# Patient Record
Sex: FEMALE | Race: WHITE | NOT HISPANIC OR LATINO | Employment: OTHER | URBAN - METROPOLITAN AREA
[De-identification: names, ages, dates, MRNs, and addresses within clinical notes are randomized per-mention and may not be internally consistent; named-entity substitution may affect disease eponyms.]

---

## 2017-04-20 ENCOUNTER — HOSPITAL ENCOUNTER (EMERGENCY)
Facility: HOSPITAL | Age: 72
Discharge: HOME | End: 2017-04-20
Attending: EMERGENCY MEDICINE | Admitting: EMERGENCY MEDICINE

## 2017-04-20 ENCOUNTER — APPOINTMENT (OUTPATIENT)
Dept: GENERAL RADIOLOGY | Facility: HOSPITAL | Age: 72
End: 2017-04-20

## 2017-04-20 VITALS
WEIGHT: 150 LBS | RESPIRATION RATE: 18 BRPM | HEART RATE: 87 BPM | DIASTOLIC BLOOD PRESSURE: 81 MMHG | SYSTOLIC BLOOD PRESSURE: 118 MMHG | HEIGHT: 63 IN | BODY MASS INDEX: 26.58 KG/M2 | TEMPERATURE: 98.3 F | OXYGEN SATURATION: 98 %

## 2017-04-20 DIAGNOSIS — R06.00 DYSPNEA, UNSPECIFIED TYPE: Primary | ICD-10-CM

## 2017-04-20 DIAGNOSIS — R07.9 CHEST PAIN, UNSPECIFIED TYPE: ICD-10-CM

## 2017-04-20 LAB
ALBUMIN SERPL-MCNC: 4.2 G/DL (ref 3.2–4.8)
ALBUMIN/GLOB SERPL: 1.4 G/DL (ref 1.5–2.5)
ALP SERPL-CCNC: 87 U/L (ref 25–100)
ALT SERPL W P-5'-P-CCNC: 14 U/L (ref 7–40)
ANION GAP SERPL CALCULATED.3IONS-SCNC: 9 MMOL/L (ref 3–11)
AST SERPL-CCNC: 28 U/L (ref 0–33)
BASOPHILS # BLD AUTO: 0.04 10*3/MM3 (ref 0–0.2)
BASOPHILS NFR BLD AUTO: 0.5 % (ref 0–1)
BILIRUB SERPL-MCNC: 0.3 MG/DL (ref 0.3–1.2)
BNP SERPL-MCNC: 58 PG/ML (ref 0–100)
BUN BLD-MCNC: 33 MG/DL (ref 9–23)
BUN/CREAT SERPL: 33 (ref 7–25)
CALCIUM SPEC-SCNC: 9.4 MG/DL (ref 8.7–10.4)
CHLORIDE SERPL-SCNC: 105 MMOL/L (ref 99–109)
CO2 SERPL-SCNC: 25 MMOL/L (ref 20–31)
CREAT BLD-MCNC: 1 MG/DL (ref 0.6–1.3)
DEPRECATED RDW RBC AUTO: 52.7 FL (ref 37–54)
EOSINOPHIL # BLD AUTO: 0.31 10*3/MM3 (ref 0.1–0.3)
EOSINOPHIL NFR BLD AUTO: 3.9 % (ref 0–3)
ERYTHROCYTE [DISTWIDTH] IN BLOOD BY AUTOMATED COUNT: 14.2 % (ref 11.3–14.5)
GFR SERPL CREATININE-BSD FRML MDRD: 55 ML/MIN/1.73
GLOBULIN UR ELPH-MCNC: 2.9 GM/DL
GLUCOSE BLD-MCNC: 105 MG/DL (ref 70–100)
HCT VFR BLD AUTO: 33.5 % (ref 34.5–44)
HGB BLD-MCNC: 10.2 G/DL (ref 11.5–15.5)
HOLD SPECIMEN: NORMAL
HOLD SPECIMEN: NORMAL
IMM GRANULOCYTES # BLD: 0.03 10*3/MM3 (ref 0–0.03)
IMM GRANULOCYTES NFR BLD: 0.4 % (ref 0–0.6)
LIPASE SERPL-CCNC: 114 U/L (ref 6–51)
LYMPHOCYTES # BLD AUTO: 1.41 10*3/MM3 (ref 0.6–4.8)
LYMPHOCYTES NFR BLD AUTO: 17.5 % (ref 24–44)
MCH RBC QN AUTO: 31.2 PG (ref 27–31)
MCHC RBC AUTO-ENTMCNC: 30.4 G/DL (ref 32–36)
MCV RBC AUTO: 102.4 FL (ref 80–99)
MONOCYTES # BLD AUTO: 0.66 10*3/MM3 (ref 0–1)
MONOCYTES NFR BLD AUTO: 8.2 % (ref 0–12)
NEUTROPHILS # BLD AUTO: 5.59 10*3/MM3 (ref 1.5–8.3)
NEUTROPHILS NFR BLD AUTO: 69.5 % (ref 41–71)
PLATELET # BLD AUTO: 243 10*3/MM3 (ref 150–450)
PMV BLD AUTO: 9.5 FL (ref 6–12)
POTASSIUM BLD-SCNC: 4.3 MMOL/L (ref 3.5–5.5)
PROT SERPL-MCNC: 7.1 G/DL (ref 5.7–8.2)
RBC # BLD AUTO: 3.27 10*6/MM3 (ref 3.89–5.14)
SODIUM BLD-SCNC: 139 MMOL/L (ref 132–146)
TROPONIN I SERPL-MCNC: 0.01 NG/ML (ref 0–0.07)
TROPONIN I SERPL-MCNC: 0.02 NG/ML (ref 0–0.07)
WBC NRBC COR # BLD: 8.04 10*3/MM3 (ref 3.5–10.8)
WHOLE BLOOD HOLD SPECIMEN: NORMAL
WHOLE BLOOD HOLD SPECIMEN: NORMAL

## 2017-04-20 PROCEDURE — 83880 ASSAY OF NATRIURETIC PEPTIDE: CPT | Performed by: EMERGENCY MEDICINE

## 2017-04-20 PROCEDURE — 93005 ELECTROCARDIOGRAM TRACING: CPT | Performed by: EMERGENCY MEDICINE

## 2017-04-20 PROCEDURE — 85025 COMPLETE CBC W/AUTO DIFF WBC: CPT | Performed by: EMERGENCY MEDICINE

## 2017-04-20 PROCEDURE — 84484 ASSAY OF TROPONIN QUANT: CPT

## 2017-04-20 PROCEDURE — 80053 COMPREHEN METABOLIC PANEL: CPT | Performed by: EMERGENCY MEDICINE

## 2017-04-20 PROCEDURE — 71010 HC CHEST PA OR AP: CPT

## 2017-04-20 PROCEDURE — 83690 ASSAY OF LIPASE: CPT | Performed by: EMERGENCY MEDICINE

## 2017-04-20 PROCEDURE — 99284 EMERGENCY DEPT VISIT MOD MDM: CPT

## 2017-04-20 RX ORDER — ASPIRIN 325 MG
325 TABLET, DELAYED RELEASE (ENTERIC COATED) ORAL EVERY 6 HOURS PRN
COMMUNITY
End: 2018-12-13

## 2017-04-20 RX ORDER — ISOSORBIDE MONONITRATE 60 MG/1
60 TABLET, EXTENDED RELEASE ORAL DAILY
COMMUNITY
End: 2018-12-13

## 2017-04-20 RX ORDER — IBUPROFEN 200 MG
200 TABLET ORAL DAILY
COMMUNITY
End: 2018-12-13

## 2017-04-20 RX ORDER — FUROSEMIDE 80 MG
40 TABLET ORAL 2 TIMES DAILY
COMMUNITY
End: 2018-12-13

## 2017-04-20 RX ORDER — SULFASALAZINE 500 MG/1
500 TABLET ORAL 3 TIMES DAILY
COMMUNITY
End: 2018-12-13

## 2017-04-20 RX ORDER — OMEPRAZOLE 20 MG/1
20 CAPSULE, DELAYED RELEASE ORAL DAILY
COMMUNITY

## 2017-04-20 RX ORDER — ASPIRIN 81 MG/1
324 TABLET, CHEWABLE ORAL ONCE
Status: DISCONTINUED | OUTPATIENT
Start: 2017-04-20 | End: 2017-04-20 | Stop reason: HOSPADM

## 2017-04-20 RX ORDER — LISINOPRIL 20 MG/1
20 TABLET ORAL 2 TIMES DAILY
COMMUNITY

## 2017-04-20 RX ORDER — RANITIDINE 300 MG/1
300 CAPSULE ORAL EVERY EVENING
Status: ON HOLD | COMMUNITY
End: 2018-12-14

## 2017-04-20 RX ORDER — BUPROPION HYDROCHLORIDE 150 MG/1
150 TABLET ORAL DAILY
COMMUNITY

## 2017-04-20 RX ORDER — SODIUM CHLORIDE 0.9 % (FLUSH) 0.9 %
10 SYRINGE (ML) INJECTION AS NEEDED
Status: DISCONTINUED | OUTPATIENT
Start: 2017-04-20 | End: 2017-04-20 | Stop reason: HOSPADM

## 2017-04-20 RX ORDER — CYCLOBENZAPRINE HCL 10 MG
20 TABLET ORAL NIGHTLY
COMMUNITY

## 2017-04-20 RX ORDER — SPIRONOLACTONE 25 MG/1
25 TABLET ORAL DAILY
Status: ON HOLD | COMMUNITY
End: 2018-12-15 | Stop reason: SDUPTHER

## 2017-04-20 RX ORDER — FOLIC ACID 1 MG/1
1 TABLET ORAL NIGHTLY
COMMUNITY

## 2017-04-20 RX ORDER — LEFLUNOMIDE 20 MG/1
20 TABLET ORAL DAILY
COMMUNITY
End: 2018-12-13

## 2017-04-20 RX ORDER — TRAZODONE HYDROCHLORIDE 100 MG/1
150 TABLET ORAL NIGHTLY
COMMUNITY

## 2017-04-20 NOTE — ED PROVIDER NOTES
Subjective   HPI Comments: Stacia Adkins is a 71 y.o. female presenting to the ED with c/o chest pain. Mrs. Adkins reports that she had a sudden onset of difficulty breathing and chest pain at approximately 1230 today. The pain was located in the middle of her chest, which she describes as a pressure. It does not radiate anywhere. She also states that she began having tingling in her legs. She reports that standing in line at [employer] for an extended period of time, which she reports caused her discomfort. The pain worsened even after she took a seat immediately after onset. She was given aspirin prior to arrival by EMS. She currently presents to the ED reporting that it has begun to improve. Mrs. Adkins reports that she has had a similar episode in the past, with no known cause. She follows up with Dr. Perea. She normally does not have to wear oxygen outside of the house, but has a CPAP machine at night. Denies any nausea, sweats, fevers, dysuria, or chills. No other complaints at this time.    Patient is a 71 y.o. female presenting with chest pain.   History provided by:  Patient  Chest Pain   Pain location:  Substernal area  Pain quality: pressure    Pain radiates to:  Does not radiate  Pain severity:  Moderate  Onset quality:  Sudden  Duration:  2 hours  Timing:  Constant  Progression:  Improving  Chronicity:  New  Relieved by:  Aspirin  Worsened by:  Certain positions (standing)  Ineffective treatments:  None tried  Associated symptoms: numbness and shortness of breath    Associated symptoms: no abdominal pain, no back pain, no cough, no dizziness, no fatigue, no fever, no headache, no nausea, no palpitations, no vomiting and no weakness        Review of Systems   Constitutional: Negative for chills, fatigue and fever.   HENT: Negative for congestion, ear pain, postnasal drip, sinus pressure and sore throat.    Eyes: Negative for pain, redness and visual disturbance.   Respiratory: Positive for shortness of breath.  Negative for cough and chest tightness.    Cardiovascular: Positive for chest pain. Negative for palpitations and leg swelling.   Gastrointestinal: Negative for abdominal pain, anal bleeding, blood in stool, diarrhea, nausea and vomiting.   Endocrine: Negative for polydipsia and polyuria.   Genitourinary: Negative for difficulty urinating, dysuria, frequency and urgency.   Musculoskeletal: Negative for arthralgias, back pain and neck pain.   Skin: Negative for pallor and rash.   Allergic/Immunologic: Negative for environmental allergies and immunocompromised state.   Neurological: Positive for numbness. Negative for dizziness, weakness and headaches.   Hematological: Negative for adenopathy.   Psychiatric/Behavioral: Negative for confusion, self-injury and suicidal ideas. The patient is not nervous/anxious.    All other systems reviewed and are negative.      Past Medical History:   Diagnosis Date   • Arthritis, rheumatoid    • Back pain    • CHF (congestive heart failure)        Allergies   Allergen Reactions   • Gabapentin    • Penicillins    • Vancomycin        Past Surgical History:   Procedure Laterality Date   • BACK SURGERY     • CARDIAC VALVE SURGERY     • SHOULDER SURGERY         History reviewed. No pertinent family history.    Social History     Social History   • Marital status:      Spouse name: N/A   • Number of children: N/A   • Years of education: N/A     Social History Main Topics   • Smoking status: Never Smoker   • Smokeless tobacco: None   • Alcohol use No   • Drug use: No   • Sexual activity: Not Asked     Other Topics Concern   • None     Social History Narrative   • None         Objective   Physical Exam   Constitutional: She is oriented to person, place, and time. She appears well-developed and well-nourished.  Non-toxic appearance. No distress (NAD).   HENT:   Head: Normocephalic and atraumatic.   Right Ear: External ear normal.   Left Ear: External ear normal.   Nose: Nose normal.    Eyes: EOM and lids are normal. Pupils are equal, round, and reactive to light.   Neck: Normal range of motion. Neck supple. No tracheal deviation present.   Cardiovascular: Normal rate, regular rhythm and normal heart sounds.  Exam reveals no gallop, no friction rub and no decreased pulses.    No murmur heard.  Pulmonary/Chest: Effort normal and breath sounds normal. No respiratory distress. She has no decreased breath sounds. She has no wheezes. She has no rhonchi. She has no rales.   Breathing comfortably.   Abdominal: Soft. Normal appearance and bowel sounds are normal. There is tenderness (mild ttp) in the periumbilical area. There is no rebound and no guarding.   Musculoskeletal: Normal range of motion. She exhibits no deformity.   Lymphadenopathy:     She has no cervical adenopathy.   Neurological: She is alert and oriented to person, place, and time. She has normal strength. No cranial nerve deficit or sensory deficit.   Skin: Skin is warm and dry. No rash noted. She is not diaphoretic.   Psychiatric: She has a normal mood and affect. Her speech is normal and behavior is normal. Judgment and thought content normal. Cognition and memory are normal.   Nursing note and vitals reviewed.      Procedures         ED Course  ED Course                     MDM  Number of Diagnoses or Management Options  Chest pain, unspecified type: new and requires workup  Dyspnea, unspecified type: new and requires workup  Diagnosis management comments: Patient had normal serial cardiac enzymes, and EKGs. NO acute CXR abnormalities.     Patient has remained well appearing with normal vital signs while here in ER.     DC home appearing and feeling well.        Amount and/or Complexity of Data Reviewed  Clinical lab tests: ordered and reviewed  Tests in the radiology section of CPT®: ordered and reviewed  Decide to obtain previous medical records or to obtain history from someone other than the patient: yes  Review and summarize past  medical records: yes  Independent visualization of images, tracings, or specimens: yes    Patient Progress  Patient progress: stable      Final diagnoses:   Dyspnea, unspecified type   Chest pain, unspecified type       Documentation assistance provided by charli Franco.  Information recorded by the scribe was done at my direction and has been verified and validated by me.     Kane Franco  04/20/17 [id number]       Trey Pagan MD  04/20/17 2349

## 2018-12-13 ENCOUNTER — APPOINTMENT (OUTPATIENT)
Dept: GENERAL RADIOLOGY | Facility: HOSPITAL | Age: 73
End: 2018-12-13

## 2018-12-13 ENCOUNTER — APPOINTMENT (OUTPATIENT)
Dept: CT IMAGING | Facility: HOSPITAL | Age: 73
End: 2018-12-13

## 2018-12-13 ENCOUNTER — HOSPITAL ENCOUNTER (OUTPATIENT)
Facility: HOSPITAL | Age: 73
Setting detail: OBSERVATION
Discharge: HOME | End: 2018-12-15
Attending: EMERGENCY MEDICINE | Admitting: INTERNAL MEDICINE

## 2018-12-13 DIAGNOSIS — T79.6XXA TRAUMATIC RHABDOMYOLYSIS, INITIAL ENCOUNTER (HCC): ICD-10-CM

## 2018-12-13 DIAGNOSIS — Z78.9 IMPAIRED MOBILITY AND ADLS: ICD-10-CM

## 2018-12-13 DIAGNOSIS — R06.02 SHORTNESS OF BREATH: Primary | ICD-10-CM

## 2018-12-13 DIAGNOSIS — F43.0 STRESS RESPONSE: ICD-10-CM
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
## 2018-12-13 DIAGNOSIS — Z74.09 IMPAIRED MOBILITY AND ADLS: ICD-10-CM
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
## 2018-12-13 DIAGNOSIS — Z74.09 IMPAIRED FUNCTIONAL MOBILITY, BALANCE, GAIT, AND ENDURANCE: ICD-10-CM

## 2018-12-13 PROBLEM — M06.9 RHEUMATOID ARTHRITIS INVOLVING MULTIPLE SITES: Status: ACTIVE | Noted: 2018-12-13

## 2018-12-13 PROBLEM — I50.32 DIASTOLIC CHF, CHRONIC: Status: ACTIVE | Noted: 2018-12-13

## 2018-12-13 PROBLEM — Z95.2 H/O AORTIC VALVE REPLACEMENT: Status: ACTIVE | Noted: 2018-12-13

## 2018-12-13 PROBLEM — M62.82 RHABDOMYOLYSIS: Status: ACTIVE | Noted: 2018-12-13

## 2018-12-13 LAB
ALBUMIN SERPL-MCNC: 4.58 G/DL (ref 3.2–4.8)
ALBUMIN/GLOB SERPL: 1.8 G/DL (ref 1.5–2.5)
ALP SERPL-CCNC: 139 U/L (ref 25–100)
ALT SERPL W P-5'-P-CCNC: 47 U/L (ref 7–40)
ANION GAP SERPL CALCULATED.3IONS-SCNC: 13 MMOL/L (ref 3–11)
AST SERPL-CCNC: 65 U/L (ref 0–33)
BASOPHILS # BLD AUTO: 0.02 10*3/MM3 (ref 0–0.2)
BASOPHILS NFR BLD AUTO: 0.2 % (ref 0–1)
BILIRUB SERPL-MCNC: 0.7 MG/DL (ref 0.3–1.2)
BNP SERPL-MCNC: 100 PG/ML (ref 0–100)
BUN BLD-MCNC: 30 MG/DL (ref 9–23)
BUN/CREAT SERPL: 28.8 (ref 7–25)
CALCIUM SPEC-SCNC: 9.6 MG/DL (ref 8.7–10.4)
CHLORIDE SERPL-SCNC: 103 MMOL/L (ref 99–109)
CK SERPL-CCNC: 695 U/L (ref 26–174)
CO2 SERPL-SCNC: 23 MMOL/L (ref 20–31)
CREAT BLD-MCNC: 1.04 MG/DL (ref 0.6–1.3)
DEPRECATED RDW RBC AUTO: 50.4 FL (ref 37–54)
EOSINOPHIL # BLD AUTO: 0.07 10*3/MM3 (ref 0–0.3)
EOSINOPHIL NFR BLD AUTO: 0.6 % (ref 0–3)
ERYTHROCYTE [DISTWIDTH] IN BLOOD BY AUTOMATED COUNT: 14.4 % (ref 11.3–14.5)
GFR SERPL CREATININE-BSD FRML MDRD: 52 ML/MIN/1.73
GLOBULIN UR ELPH-MCNC: 2.5 GM/DL
GLUCOSE BLD-MCNC: 158 MG/DL (ref 70–100)
HCT VFR BLD AUTO: 43 % (ref 34.5–44)
HGB BLD-MCNC: 14 G/DL (ref 11.5–15.5)
HOLD SPECIMEN: NORMAL
HOLD SPECIMEN: NORMAL
IMM GRANULOCYTES # BLD: 0.04 10*3/MM3 (ref 0–0.03)
IMM GRANULOCYTES NFR BLD: 0.3 % (ref 0–0.6)
LYMPHOCYTES # BLD AUTO: 1.92 10*3/MM3 (ref 0.6–4.8)
LYMPHOCYTES NFR BLD AUTO: 16.7 % (ref 24–44)
MCH RBC QN AUTO: 31.5 PG (ref 27–31)
MCHC RBC AUTO-ENTMCNC: 32.6 G/DL (ref 32–36)
MCV RBC AUTO: 96.6 FL (ref 80–99)
MONOCYTES # BLD AUTO: 0.41 10*3/MM3 (ref 0–1)
MONOCYTES NFR BLD AUTO: 3.6 % (ref 0–12)
NEUTROPHILS # BLD AUTO: 9.06 10*3/MM3 (ref 1.5–8.3)
NEUTROPHILS NFR BLD AUTO: 78.9 % (ref 41–71)
PLATELET # BLD AUTO: 250 10*3/MM3 (ref 150–450)
PMV BLD AUTO: 9.9 FL (ref 6–12)
POTASSIUM BLD-SCNC: 3.8 MMOL/L (ref 3.5–5.5)
PROT SERPL-MCNC: 7.1 G/DL (ref 5.7–8.2)
RBC # BLD AUTO: 4.45 10*6/MM3 (ref 3.89–5.14)
SODIUM BLD-SCNC: 139 MMOL/L (ref 132–146)
TROPONIN I SERPL-MCNC: 0.05 NG/ML (ref 0–0.07)
TROPONIN I SERPL-MCNC: 0.05 NG/ML (ref 0–0.07)
WBC NRBC COR # BLD: 11.48 10*3/MM3 (ref 3.5–10.8)
WHOLE BLOOD HOLD SPECIMEN: NORMAL
WHOLE BLOOD HOLD SPECIMEN: NORMAL

## 2018-12-13 PROCEDURE — 96372 THER/PROPH/DIAG INJ SC/IM: CPT

## 2018-12-13 PROCEDURE — 84484 ASSAY OF TROPONIN QUANT: CPT

## 2018-12-13 PROCEDURE — G0378 HOSPITAL OBSERVATION PER HR: HCPCS

## 2018-12-13 PROCEDURE — 96375 TX/PRO/DX INJ NEW DRUG ADDON: CPT

## 2018-12-13 PROCEDURE — 96361 HYDRATE IV INFUSION ADD-ON: CPT

## 2018-12-13 PROCEDURE — 99285 EMERGENCY DEPT VISIT HI MDM: CPT

## 2018-12-13 PROCEDURE — 83880 ASSAY OF NATRIURETIC PEPTIDE: CPT | Performed by: EMERGENCY MEDICINE

## 2018-12-13 PROCEDURE — 71045 X-RAY EXAM CHEST 1 VIEW: CPT

## 2018-12-13 PROCEDURE — 25010000002 HEPARIN (PORCINE) PER 1000 UNITS: Performed by: INTERNAL MEDICINE

## 2018-12-13 PROCEDURE — 25010000002 LORAZEPAM PER 2 MG: Performed by: EMERGENCY MEDICINE

## 2018-12-13 PROCEDURE — 99220 PR INITIAL OBSERVATION CARE/DAY 70 MINUTES: CPT | Performed by: INTERNAL MEDICINE

## 2018-12-13 PROCEDURE — 85025 COMPLETE CBC W/AUTO DIFF WBC: CPT | Performed by: EMERGENCY MEDICINE

## 2018-12-13 PROCEDURE — 25010000002 KETOROLAC TROMETHAMINE PER 15 MG: Performed by: EMERGENCY MEDICINE

## 2018-12-13 PROCEDURE — 93005 ELECTROCARDIOGRAM TRACING: CPT | Performed by: EMERGENCY MEDICINE

## 2018-12-13 PROCEDURE — 80053 COMPREHEN METABOLIC PANEL: CPT | Performed by: EMERGENCY MEDICINE

## 2018-12-13 PROCEDURE — 96374 THER/PROPH/DIAG INJ IV PUSH: CPT

## 2018-12-13 PROCEDURE — 82550 ASSAY OF CK (CPK): CPT | Performed by: EMERGENCY MEDICINE

## 2018-12-13 PROCEDURE — 70450 CT HEAD/BRAIN W/O DYE: CPT

## 2018-12-13 RX ORDER — HYDROCODONE BITARTRATE AND ACETAMINOPHEN 5; 325 MG/1; MG/1
1 TABLET ORAL EVERY 4 HOURS PRN
Status: DISCONTINUED | OUTPATIENT
Start: 2018-12-13 | End: 2018-12-15 | Stop reason: HOSPADM

## 2018-12-13 RX ORDER — SODIUM CHLORIDE 0.9 % (FLUSH) 0.9 %
3 SYRINGE (ML) INJECTION EVERY 12 HOURS SCHEDULED
Status: DISCONTINUED | OUTPATIENT
Start: 2018-12-13 | End: 2018-12-15 | Stop reason: HOSPADM

## 2018-12-13 RX ORDER — HEPARIN SODIUM 5000 [USP'U]/ML
5000 INJECTION, SOLUTION INTRAVENOUS; SUBCUTANEOUS EVERY 8 HOURS SCHEDULED
Status: DISCONTINUED | OUTPATIENT
Start: 2018-12-13 | End: 2018-12-15 | Stop reason: HOSPADM

## 2018-12-13 RX ORDER — ONDANSETRON 4 MG/1
4 TABLET, FILM COATED ORAL EVERY 6 HOURS PRN
Status: DISCONTINUED | OUTPATIENT
Start: 2018-12-13 | End: 2018-12-15 | Stop reason: HOSPADM

## 2018-12-13 RX ORDER — ASPIRIN 81 MG/1
81 TABLET, CHEWABLE ORAL DAILY
Status: DISCONTINUED | OUTPATIENT
Start: 2018-12-13 | End: 2018-12-15 | Stop reason: HOSPADM

## 2018-12-13 RX ORDER — BUPROPION HYDROCHLORIDE 150 MG/1
150 TABLET ORAL DAILY
Status: DISCONTINUED | OUTPATIENT
Start: 2018-12-13 | End: 2018-12-15 | Stop reason: HOSPADM

## 2018-12-13 RX ORDER — KETOROLAC TROMETHAMINE 15 MG/ML
7.5 INJECTION, SOLUTION INTRAMUSCULAR; INTRAVENOUS ONCE
Status: COMPLETED | OUTPATIENT
Start: 2018-12-13 | End: 2018-12-13

## 2018-12-13 RX ORDER — ASPIRIN 81 MG/1
81 TABLET, CHEWABLE ORAL NIGHTLY
COMMUNITY

## 2018-12-13 RX ORDER — FOLIC ACID 1 MG/1
1 TABLET ORAL DAILY
Status: DISCONTINUED | OUTPATIENT
Start: 2018-12-13 | End: 2018-12-15 | Stop reason: HOSPADM

## 2018-12-13 RX ORDER — RANOLAZINE 500 MG/1
500 TABLET, EXTENDED RELEASE ORAL DAILY
COMMUNITY

## 2018-12-13 RX ORDER — RANOLAZINE 500 MG/1
500 TABLET, EXTENDED RELEASE ORAL EVERY 12 HOURS SCHEDULED
Status: DISCONTINUED | OUTPATIENT
Start: 2018-12-13 | End: 2018-12-15 | Stop reason: HOSPADM

## 2018-12-13 RX ORDER — ACETAMINOPHEN 325 MG/1
650 TABLET ORAL EVERY 4 HOURS PRN
Status: DISCONTINUED | OUTPATIENT
Start: 2018-12-13 | End: 2018-12-15 | Stop reason: HOSPADM

## 2018-12-13 RX ORDER — SODIUM CHLORIDE 9 MG/ML
125 INJECTION, SOLUTION INTRAVENOUS CONTINUOUS
Status: DISCONTINUED | OUTPATIENT
Start: 2018-12-13 | End: 2018-12-13

## 2018-12-13 RX ORDER — SODIUM CHLORIDE 0.9 % (FLUSH) 0.9 %
1-10 SYRINGE (ML) INJECTION AS NEEDED
Status: DISCONTINUED | OUTPATIENT
Start: 2018-12-13 | End: 2018-12-15 | Stop reason: HOSPADM

## 2018-12-13 RX ORDER — LORAZEPAM 2 MG/ML
1 INJECTION INTRAMUSCULAR ONCE
Status: COMPLETED | OUTPATIENT
Start: 2018-12-13 | End: 2018-12-13

## 2018-12-13 RX ORDER — SODIUM CHLORIDE 0.9 % (FLUSH) 0.9 %
10 SYRINGE (ML) INJECTION AS NEEDED
Status: DISCONTINUED | OUTPATIENT
Start: 2018-12-13 | End: 2018-12-15 | Stop reason: HOSPADM

## 2018-12-13 RX ORDER — SODIUM CHLORIDE 9 MG/ML
100 INJECTION, SOLUTION INTRAVENOUS CONTINUOUS
Status: DISCONTINUED | OUTPATIENT
Start: 2018-12-13 | End: 2018-12-14

## 2018-12-13 RX ORDER — ONDANSETRON 2 MG/ML
4 INJECTION INTRAMUSCULAR; INTRAVENOUS EVERY 6 HOURS PRN
Status: DISCONTINUED | OUTPATIENT
Start: 2018-12-13 | End: 2018-12-15 | Stop reason: HOSPADM

## 2018-12-13 RX ORDER — HYDROCODONE BITARTRATE AND ACETAMINOPHEN 10; 325 MG/1; MG/1
1 TABLET ORAL 2 TIMES DAILY PRN
COMMUNITY

## 2018-12-13 RX ORDER — FUROSEMIDE 40 MG/1
40 TABLET ORAL DAILY
Status: ON HOLD | COMMUNITY
End: 2018-12-15 | Stop reason: SDUPTHER

## 2018-12-13 RX ORDER — PANTOPRAZOLE SODIUM 40 MG/1
40 TABLET, DELAYED RELEASE ORAL EVERY MORNING
Status: DISCONTINUED | OUTPATIENT
Start: 2018-12-14 | End: 2018-12-15 | Stop reason: HOSPADM

## 2018-12-13 RX ORDER — CYCLOBENZAPRINE HCL 10 MG
10 TABLET ORAL 2 TIMES DAILY PRN
Status: DISCONTINUED | OUTPATIENT
Start: 2018-12-13 | End: 2018-12-14

## 2018-12-13 RX ADMIN — BUPROPION HYDROCHLORIDE 150 MG: 150 TABLET, FILM COATED, EXTENDED RELEASE ORAL at 20:23

## 2018-12-13 RX ADMIN — SODIUM CHLORIDE 100 ML/HR: 9 INJECTION, SOLUTION INTRAVENOUS at 20:10

## 2018-12-13 RX ADMIN — RANOLAZINE 500 MG: 500 TABLET, FILM COATED, EXTENDED RELEASE ORAL at 20:23

## 2018-12-13 RX ADMIN — SODIUM CHLORIDE 500 ML: 9 INJECTION, SOLUTION INTRAVENOUS at 14:54

## 2018-12-13 RX ADMIN — KETOROLAC TROMETHAMINE 7.5 MG: 15 INJECTION, SOLUTION INTRAMUSCULAR; INTRAVENOUS at 12:11

## 2018-12-13 RX ADMIN — ASPIRIN 81 MG 81 MG: 81 TABLET ORAL at 20:23

## 2018-12-13 RX ADMIN — FOLIC ACID 1 MG: 1 TABLET ORAL at 20:24

## 2018-12-13 RX ADMIN — TRAZODONE HYDROCHLORIDE 150 MG: 100 TABLET ORAL at 20:23

## 2018-12-13 RX ADMIN — LORAZEPAM 1 MG: 2 INJECTION INTRAMUSCULAR; INTRAVENOUS at 12:12

## 2018-12-13 RX ADMIN — CYCLOBENZAPRINE HYDROCHLORIDE 10 MG: 10 TABLET, FILM COATED ORAL at 20:29

## 2018-12-13 RX ADMIN — SODIUM CHLORIDE, PRESERVATIVE FREE 3 ML: 5 INJECTION INTRAVENOUS at 20:22

## 2018-12-13 RX ADMIN — HEPARIN SODIUM 5000 UNITS: 5000 INJECTION INTRAVENOUS; SUBCUTANEOUS at 20:23

## 2018-12-13 RX ADMIN — HYDROCODONE BITARTRATE AND ACETAMINOPHEN 1 TABLET: 5; 325 TABLET ORAL at 18:42

## 2018-12-13 RX ADMIN — SODIUM CHLORIDE 125 ML/HR: 9 INJECTION, SOLUTION INTRAVENOUS at 14:53

## 2018-12-13 NOTE — ED PROVIDER NOTES
"Subjective   Stacia Adkins is a 73 y.o.female with a hx of arthritis and CHF who presents to the ED with complaints of shortness of breath. Per EMS, the patient was sitting in [employer] parking lot and had sudden onset of shortness of breath while in her car. The [employer] manager called EMS to bring her to the ED for evaluation. The patient appears quite anxious and says that she had a fall 2 days ago where she hit her head. She did not lose consciousness. She says she developed chest pain after and had significant bruising. She says she has a large bruise on her side. She went to her Rheumatologist who collected blood work. She said she received a call 15 minutes prior to her episode of shortness of breath today which informed her that her blood work was not good and that they believe her fall \"harmed her muscles\". She was told to follow up with her PCP as soon as possible. She currently complains of non-radiating sharp substernal chest pain which is worsened by talking. There are no other acute complaints at this time.         History provided by:  EMS personnel and patient  Shortness of Breath   Onset quality:  Sudden  Duration:  1 hour  Timing:  Constant  Progression:  Unchanged  Chronicity:  New  Context: emotional upset    Relieved by:  Nothing  Exacerbated by: talking.  Associated symptoms: chest pain    Risk factors: no obesity        Review of Systems   Respiratory: Positive for shortness of breath.    Cardiovascular: Positive for chest pain.   Musculoskeletal: Positive for myalgias (from fall).   Skin: Positive for wound.   Psychiatric/Behavioral: The patient is nervous/anxious.    All other systems reviewed and are negative.      Past Medical History:   Diagnosis Date   • Arthritis, rheumatoid (CMS/HCC)    • Back pain    • CHF (congestive heart failure) (CMS/[facility])        Allergies   Allergen Reactions   • Gabapentin    • Penicillins    • Vancomycin        Past Surgical History:   Procedure Laterality " Date   • BACK SURGERY     • CARDIAC VALVE SURGERY     • SHOULDER SURGERY         History reviewed. No pertinent family history.    Social History     Socioeconomic History   • Marital status:      Spouse name: Not on file   • Number of children: Not on file   • Years of education: Not on file   • Highest education level: Not on file   Tobacco Use   • Smoking status: Never Smoker   Substance and Sexual Activity   • Alcohol use: No   • Drug use: No         Objective   Physical Exam   Constitutional: She is oriented to person, place, and time. She appears well-developed and well-nourished. No distress.   HENT:   Head: Normocephalic and atraumatic.   Nose: Nose normal.   Eyes: Conjunctivae are normal. No scleral icterus.   Neck: Normal range of motion. Neck supple.   Cardiovascular: Normal rate, regular rhythm, normal heart sounds and intact distal pulses.   No murmur heard.  Pulmonary/Chest: Breath sounds normal. Tachypnea noted. No respiratory distress.   Tachypneic.    Abdominal: Soft. Bowel sounds are normal. There is no tenderness.   Musculoskeletal: Normal range of motion. She exhibits no edema.   Contusion and ecchymosis of right shoulder.    Neurological: She is alert and oriented to person, place, and time.   Skin: Skin is warm and dry.   Psychiatric: She has a normal mood and affect. Her behavior is normal.   Significantly anxious.    Nursing note and vitals reviewed.      Procedures         ED Course  ED Course as of Dec 13 2001   Thu Dec 13, 2018   1156 Patient presented with very similar symptoms in April 2017 after having sudden onset shortness of breath while at a business.  Patient's story today is very similar.  [RS]   1203 The patient's symptoms started shortly after she got a call from another office letting her know that her labs or drawn after a fall early in the week were abnormal and what sounds like possible rhabdomyolysis.  The light the patient's acute responses significant anxiety and  stress from that information and call.  Patient's oxygen saturations are 100% on room air and she has a normal sinus rhythm.  Vital signs are stable.  [RS]   1230 Troponin I: 0.05 [RS]   1252 Creatine Kinase: (!) 695 [RS]   1318 Paged the Hospitalist.   [TJ]   1339 Revisited the patient at bedside for reevaluation and updated her on findings and plan for admission.   [TJ]   1451 Troponin I: 0.05 [RS]   1451 Discussed the patient's case with Dr. Cruz, Hospitalist, who will admit.   [TJ]   1452 Case discussed with Dr. Cruz who agrees with the plan to admit.  [RS]      ED Course User Index  [RS] Tao Elizondo MD  [TJ] Quinten Lechuga     Recent Results (from the past 24 hour(s))   Comprehensive Metabolic Panel    Collection Time: 12/13/18 12:03 PM   Result Value Ref Range    Glucose 158 (H) 70 - 100 mg/dL    BUN 30 (H) 9 - 23 mg/dL    Creatinine 1.04 0.60 - 1.30 mg/dL    Sodium 139 132 - 146 mmol/L    Potassium 3.8 3.5 - 5.5 mmol/L    Chloride 103 99 - 109 mmol/L    CO2 23.0 20.0 - 31.0 mmol/L    Calcium 9.6 8.7 - 10.4 mg/dL    Total Protein 7.1 5.7 - 8.2 g/dL    Albumin 4.58 3.20 - 4.80 g/dL    ALT (SGPT) 47 (H) 7 - 40 U/L    AST (SGOT) 65 (H) 0 - 33 U/L    Alkaline Phosphatase 139 (H) 25 - 100 U/L    Total Bilirubin 0.7 0.3 - 1.2 mg/dL    eGFR Non African Amer 52 (L) >60 mL/min/1.73    Globulin 2.5 gm/dL    A/G Ratio 1.8 1.5 - 2.5 g/dL    BUN/Creatinine Ratio 28.8 (H) 7.0 - 25.0    Anion Gap 13.0 (H) 3.0 - 11.0 mmol/L   BNP    Collection Time: 12/13/18 12:03 PM   Result Value Ref Range    .0 0.0 - 100.0 pg/mL   Light Blue Top    Collection Time: 12/13/18 12:03 PM   Result Value Ref Range    Extra Tube hold for add-on    Green Top (Gel)    Collection Time: 12/13/18 12:03 PM   Result Value Ref Range    Extra Tube Hold for add-ons.    Lavender Top    Collection Time: 12/13/18 12:03 PM   Result Value Ref Range    Extra Tube hold for add-on    Gold Top - SST    Collection Time: 12/13/18 12:03 PM    Result Value Ref Range    Extra Tube Hold for add-ons.    CBC Auto Differential    Collection Time: 12/13/18 12:03 PM   Result Value Ref Range    WBC 11.48 (H) 3.50 - 10.80 10*3/mm3    RBC 4.45 3.89 - 5.14 10*6/mm3    Hemoglobin 14.0 11.5 - 15.5 g/dL    Hematocrit 43.0 34.5 - 44.0 %    MCV 96.6 80.0 - 99.0 fL    MCH 31.5 (H) 27.0 - 31.0 pg    MCHC 32.6 32.0 - 36.0 g/dL    RDW 14.4 11.3 - 14.5 %    RDW-SD 50.4 37.0 - 54.0 fl    MPV 9.9 6.0 - 12.0 fL    Platelets 250 150 - 450 10*3/mm3    Neutrophil % 78.9 (H) 41.0 - 71.0 %    Lymphocyte % 16.7 (L) 24.0 - 44.0 %    Monocyte % 3.6 0.0 - 12.0 %    Eosinophil % 0.6 0.0 - 3.0 %    Basophil % 0.2 0.0 - 1.0 %    Immature Grans % 0.3 0.0 - 0.6 %    Neutrophils, Absolute 9.06 (H) 1.50 - 8.30 10*3/mm3    Lymphocytes, Absolute 1.92 0.60 - 4.80 10*3/mm3    Monocytes, Absolute 0.41 0.00 - 1.00 10*3/mm3    Eosinophils, Absolute 0.07 0.00 - 0.30 10*3/mm3    Basophils, Absolute 0.02 0.00 - 0.20 10*3/mm3    Immature Grans, Absolute 0.04 (H) 0.00 - 0.03 10*3/mm3   CK    Collection Time: 12/13/18 12:03 PM   Result Value Ref Range    Creatine Kinase 695 (H) 26 - 174 U/L   POC Troponin, Rapid    Collection Time: 12/13/18 12:06 PM   Result Value Ref Range    Troponin I 0.05 0.00 - 0.07 ng/mL   POC Troponin, Rapid    Collection Time: 12/13/18  2:35 PM   Result Value Ref Range    Troponin I 0.05 0.00 - 0.07 ng/mL     Note: In addition to lab results from this visit, the labs listed above may include labs taken at another facility or during a different encounter within the last 24 hours. Please correlate lab times with ED admission and discharge times for further clarification of the services performed during this visit.    XR Chest 1 View   Final Result   No active disease.       D:  12/13/2018   E:  12/13/2018       This report was finalized on 12/13/2018 1:15 PM by Dr. Armaan Martin MD.          CT Head Without Contrast   Final Result   Normal unenhanced CT scan of the head.       D:   12/13/2018   E:  12/13/2018           This report was finalized on 12/13/2018 1:14 PM by Dr. Armaan Martin MD.            Vitals:    12/13/18 1600 12/13/18 1615 12/13/18 1630 12/13/18 1645   BP: 107/86  102/67    Patient Position:       Pulse: 56 81 79 82   Resp:       Temp:       TempSrc:       SpO2: 100% 100% 100% 99%   Weight:       Height:         Medications   sodium chloride 0.9 % flush 10 mL (not administered)   aspirin chewable tablet 81 mg (not administered)   buPROPion XL (WELLBUTRIN XL) 24 hr tablet 150 mg (not administered)   cyclobenzaprine (FLEXERIL) tablet 10 mg (not administered)   folic acid (FOLVITE) tablet 1 mg (not administered)   pantoprazole (PROTONIX) EC tablet 40 mg (not administered)   ranolazine (RANEXA) 12 hr tablet 500 mg (not administered)   traZODone (DESYREL) tablet 150 mg (not administered)   sodium chloride 0.9 % flush 3 mL (not administered)   sodium chloride 0.9 % flush 1-10 mL (not administered)   acetaminophen (TYLENOL) tablet 650 mg (not administered)   heparin (porcine) 5000 UNIT/ML injection 5,000 Units (not administered)   sodium chloride 0.9 % infusion (not administered)   HYDROcodone-acetaminophen (NORCO) 5-325 MG per tablet 1 tablet (1 tablet Oral Given 12/13/18 1842)   ondansetron (ZOFRAN) tablet 4 mg (not administered)     Or   ondansetron (ZOFRAN) injection 4 mg (not administered)   LORazepam (ATIVAN) injection 1 mg (1 mg Intravenous Given 12/13/18 1212)   ketorolac (TORADOL) injection 7.5 mg (7.5 mg Intravenous Given 12/13/18 1211)   sodium chloride 0.9 % bolus 500 mL (500 mL Intravenous New Bag 12/13/18 1454)     ECG/EMG Results (last 24 hours)     Procedure Component Value Units Date/Time    ECG 12 Lead [[id number]] Collected:  12/13/18 1156     Updated:  12/13/18 1158                          MDM  Number of Diagnoses or Management Options  Fall in home, initial encounter:   Shortness of breath:   Stress response:   Traumatic rhabdomyolysis, initial encounter  (CMS/[facility]):   Diagnosis management comments: ECG/EMG Results (last 24 hours)     Procedure Component Value Units Date/Time    ECG 12 Lead ([id number]) Collected:  12/13/18 1441     Updated:  12/13/18 1958    Narrative:       Test Reason : 2nd set  Blood Pressure : **/** mmHG  Vent. Rate : 078 BPM     Atrial Rate : 078 BPM     P-R Int : 184 ms          QRS Dur : 140 ms      QT Int : 424 ms       P-R-T Axes : 040 -03 117 degrees     QTc Int : 483 ms    Normal sinus rhythm  Left bundle branch block  Abnormal ECG  When compared with ECG of 13-DEC-2018 11:56, (Unconfirmed)  No significant change was found  Confirmed by MASON WILKINSON MD ([id number]) on 12/13/2018 7:58:45 PM    Referred By:  ARLETH LEYVA           Confirmed By:MASON WILKINSON MD    ECG 12 Lead ([id number]) Collected:  12/13/18 1156     Updated:  12/13/18 1959    Narrative:       Test Reason : SOA Protocol  Blood Pressure : **/** mmHG  Vent. Rate : 082 BPM     Atrial Rate : 082 BPM     P-R Int : 164 ms          QRS Dur : 130 ms      QT Int : 434 ms       P-R-T Axes : 030 010 112 degrees     QTc Int : 507 ms    Normal sinus rhythm  Possible Left atrial enlargement  Left bundle branch block  Abnormal ECG  When compared with ECG of 20-APR-2017 15:46,  T wave inversion now evident in Anterior leads  Confirmed by MASON WILKINSON MD ([id number]) on 12/13/2018 7:59:03 PM    Referred By:  arleth leyva           Confirmed By:MASON WILKINSON MD             Amount and/or Complexity of Data Reviewed  Clinical lab tests: reviewed  Tests in the radiology section of CPT®: reviewed  Decide to obtain previous medical records or to obtain history from someone other than the patient: yes  Obtain history from someone other than the patient: yes  Discuss the patient with other providers: yes  Independent visualization of images, tracings, or specimens: yes        Final diagnoses:   Shortness of breath   Stress response   Traumatic rhabdomyolysis, initial encounter (CMS/[facility])   Fall in home, initial encounter        Documentation assistance provided by charli Lechuga.  Information recorded by the scribe was done at my direction and has been verified and validated by me.     Quinten Lechuga  12/13/18 1222       Tao Elizondo MD  12/13/18 2001

## 2018-12-13 NOTE — H&P
[facility] Medicine Services  HISTORY AND PHYSICAL    Patient Name: Stacia Adkins  : 1945  MRN: [id number]  Primary Care Physician: Ainsley, No Known  Date of admission: 2018      Subjective   Subjective     Chief Complaint:  SOA, weakness    HPI:  Stacia Adkins is a 73 y.o. female with PMH of RA, chronic back pain, diastolic HF and aortic valve replacement presents with complaints of SOA and weakness. Pt states that her symptoms started today after receiving a call from her Rheumatologist's office re: labs done two days prior to this admission.  Per pt, she fell the day prior to that and was on the floor for >8 hours.  She was seen in Rheumatology two days ago for Orencia infusion but was not able to get this due to inability to get a good O2 reading (unclear whether she was truly hypoxic or if this was due to mechanical issue as pt was not particularly SOA at that time). Labs were checked including a CK level. She was called this am and informed that she needed to see her PCP for mild rhabdo.  Pt has an appointment with her PCP but was unable to get there as she became suddenly weak and SOA while having breakfast at [employer].  EMS was called and brought her to our ER.  On admission to the ED, she is not hypoxic and vitals are otherwise stable. Her CK is mildly elevated at ~600. She is being admitted for rhabdomyolysis.     Review of Systems   General- no F/C, no weight loss or gain, no fatigue  HEENT- no blurry vision, no nasal congestion, no hearing loss, no sore throat, no dysphagia, no oral ulcers, no dental caries, no bleeding gums  CVS- no chest pain, no palpitations  Pulm- + SOA, no cough, no PND, no orthopnea  GI- no diarrhea, no constipation, no BRBPR, no nausea, no vomiting  MSK- no joint pain, no swelling, no erythema  - no dysuria, no hematuria  Neuro- + headache since falling three days ago  Psych- no SI/ HI, no depression/anxiety    Otherwise 10-system  ROS reviewed and is negative except as mentioned in the HPI.    Personal History     Past Medical History:   Diagnosis Date   • Arthritis, rheumatoid (CMS/HCC)    • Back pain    • CHF (congestive heart failure) (CMS/HCC)        Past Surgical History:   Procedure Laterality Date   • BACK SURGERY     • CARDIAC VALVE SURGERY     • SHOULDER SURGERY         Family History: family history is not on file. Otherwise pertinent FHx was reviewed and unremarkable.     Social History:  reports that  has never smoked. She does not have any smokeless tobacco history on file. She reports that she does not drink alcohol or use drugs.  Social History     Social History Narrative   • Not on file       Medications:  Available home medication information reviewed.  No current facility-administered medications on file prior to encounter.      Current Outpatient Medications on File Prior to Encounter   Medication Sig Dispense Refill   • aspirin 81 MG chewable tablet Chew 81 mg Daily.     • buPROPion XL (WELLBUTRIN XL) 150 MG 24 hr tablet Take 150 mg by mouth Daily.     • cyclobenzaprine (FLEXERIL) 10 MG tablet Take 10 mg by mouth 2 (Two) Times a Day As Needed for Muscle Spasms.     • folic acid (FOLVITE) 1 MG tablet Take 1 mg by mouth Daily.     • Hydrocodone-Acetaminophen (LORTAB )  MG per tablet Take 1 tablet by mouth 2 (Two) Times a Day.     • lisinopril (PRINIVIL,ZESTRIL) 20 MG tablet Take 20 mg by mouth Daily.     • omeprazole (priLOSEC) 20 MG capsule Take 20 mg by mouth Daily.     • ranolazine (RANEXA) 500 MG 12 hr tablet Take 500 mg by mouth 2 (Two) Times a Day.     • spironolactone (ALDACTONE) 25 MG tablet Take 25 mg by mouth Daily.     • traZODone (DESYREL) 100 MG tablet Take 150 mg by mouth Every Night.     • aspirin  MG tablet Take 325 mg by mouth Every 6 (Six) Hours As Needed.     • Calcium Carbonate (CALCIUM 600) 1500 (600 CA) MG tablet Take 600 mg by mouth Daily.     • furosemide (LASIX) 80 MG tablet Take  40 mg by mouth 2 (Two) Times a Day.     • ibuprofen (ADVIL,MOTRIN) 200 MG tablet Take 200 mg by mouth Daily.     • isosorbide mononitrate (IMDUR) 60 MG 24 hr tablet Take 60 mg by mouth Daily.     • leflunomide (ARAVA) 20 MG tablet Take 20 mg by mouth Daily.     • methotrexate 2.5 MG tablet Take  by mouth 1 (One) Time Per Week.     • ranitidine (ZANTAC) 300 MG capsule Take 300 mg by mouth Every Evening.     • sulfaSALAzine (AZULFIDINE) 500 MG tablet Take 500 mg by mouth 3 (Three) Times a Day.     • Vancomycin HCl (VANCOMYCIN+SYRSPEND SF PH4) 50 MG/ML suspension Take 50 mg/mL by mouth.         Allergies   Allergen Reactions   • Gabapentin    • Penicillins    • Vancomycin        Objective   Objective     Vital Signs:   Temp:  [97.4 °F (36.3 °C)-98.3 °F (36.8 °C)] 98.3 °F (36.8 °C)  Heart Rate:  [82-89] 88  Resp:  [20] 20  BP: ()/(75-88) 110/84        Physical Exam   Constitutional: No acute distress, awake, alert  Eyes: PERRLA, sclerae anicteric, no conjunctival injection  HENT: NCAT, mucous membranes moist  Neck: Supple, no thyromegaly, no lymphadenopathy, trachea midline  Respiratory: Clear to auscultation bilaterally, nonlabored respirations   Cardiovascular: RRR, no murmurs, rubs, or gallops, palpable pedal pulses bilaterally  Gastrointestinal: Positive bowel sounds, soft, nontender, nondistended  Musculoskeletal: No bilateral ankle edema, no clubbing or cyanosis to extremities  Psychiatric: Appropriate affect, cooperative  Neurologic: Oriented x 3, strength symmetric in all extremities, Cranial Nerves grossly intact to confrontation, speech clear  Skin: several ecchymoses on shoulders and neck    Results Reviewed:  I have personally reviewed current lab, radiology, and data and agree.    Results from last 7 days   Lab Units  12/13/18   1203   WBC 10*3/mm3  11.48*   HEMOGLOBIN g/dL  14.0   HEMATOCRIT %  43.0   PLATELETS 10*3/mm3  250     Results from last 7 days   Lab Units  12/13/18   1203   SODIUM mmol/L   139   POTASSIUM mmol/L  3.8   CHLORIDE mmol/L  103   CO2 mmol/L  23.0   BUN mg/dL  30*   CREATININE mg/dL  1.04   GLUCOSE mg/dL  158*   CALCIUM mg/dL  9.6   ALT (SGPT) U/L  47*   AST (SGOT) U/L  65*     Estimated Creatinine Clearance: 40.8 mL/min (by C-G formula based on SCr of 1.04 mg/dL).  Brief Urine Lab Results     None        BNP   Date Value Ref Range Status   12/13/2018 100.0 0.0 - 100.0 pg/mL Final     Comment:     Results may be falsely decreased if patient taking Biotin.     Imaging Results (last 24 hours)     Procedure Component Value Units Date/Time    XR Chest 1 View [[id number]] Collected:  12/13/18 1258     Updated:  12/13/18 1317    Narrative:       EXAMINATION: XR CHEST 1 VW- 12/13/2018     INDICATION: SOA triage protocol      COMPARISON: 04/20/2017     FINDINGS: The cardiac silhouette is normal. There is no acute  inflammatory process. There is no mass or effusion.           Impression:       No active disease.     D:  12/13/2018  E:  12/13/2018     This report was finalized on 12/13/2018 1:15 PM by Dr. Armaan Martin MD.       CT Head Without Contrast [[id number]] Collected:  12/13/18 1258     Updated:  12/13/18 1316    Narrative:       EXAMINATION: CT HEAD WO CONTRAST- 12/13/2018     INDICATION: Fall on Monday with head injury and headache         TECHNIQUE: CT scan of the head was performed at 5 mm intervals. No  intravenous contrast was utilized.     The radiation dose reduction device was turned on for each scan per the  ALARA (As Low as Reasonably Achievable) protocol.     COMPARISON: NONE     FINDINGS: There is no intracranial mass. There is no hemorrhage. There  is no midline shift or extra-axial fluid collection.       Impression:       Normal unenhanced CT scan of the head.     D:  12/13/2018  E:  12/13/2018        This report was finalized on 12/13/2018 1:14 PM by Dr. Armaan Martin MD.           Results for orders placed during the hospital encounter of 04/03/16   SCANNED - ECHOCARDIOGRAM        Assessment/Plan   Assessment / Plan     Active Hospital Problems    Diagnosis   • **Rhabdomyolysis   • Rheumatoid arthritis involving multiple sites (CMS/[facility])   • Diastolic CHF, chronic (CMS/[facility])   • H/O aortic valve replacement         Assessment & Plan:  Ms. Adkins is a 72 yo WF w/ PMH of RA, dHF, and aortic valve replacement who presents with mild rhabdomyolysis.     Plan:  --rhabdomyolysis- IVFs, hold diuretics.  PT/OT. Repeat CK in am. Monitor Cr.   --h/o RA- follows with Dr. Syed Deutsch.  Continue chronic pain meds, hold immunosuppressants  --dHF- hold diuretics as above      DVT prophylaxis:[facility]    CODE STATUS:    Code Status and Medical Interventions:   Ordered at: 12/13/18 1532     Level Of Support Discussed With:    Patient     Code Status:    CPR     Medical Interventions (Level of Support Prior to Arrest):    Full       Admission Status:  I believe this patient meets OBSERVATION status, however if further evaluation or treatment plans warrant, status may change.  Based upon current information, I predict patient's care encounter to be less than or equal to 2 midnights.    Electronically signed by Haily Cruz MD, 12/13/18, 3:32 PM.

## 2018-12-14 LAB
ANION GAP SERPL CALCULATED.3IONS-SCNC: 7 MMOL/L (ref 3–11)
BUN BLD-MCNC: 29 MG/DL (ref 9–23)
BUN/CREAT SERPL: 28.7 (ref 7–25)
CALCIUM SPEC-SCNC: 8 MG/DL (ref 8.7–10.4)
CHLORIDE SERPL-SCNC: 111 MMOL/L (ref 99–109)
CK SERPL-CCNC: 278 U/L (ref 26–174)
CO2 SERPL-SCNC: 22 MMOL/L (ref 20–31)
CREAT BLD-MCNC: 1.01 MG/DL (ref 0.6–1.3)
DEPRECATED RDW RBC AUTO: 51.1 FL (ref 37–54)
ERYTHROCYTE [DISTWIDTH] IN BLOOD BY AUTOMATED COUNT: 14.4 % (ref 11.3–14.5)
GFR SERPL CREATININE-BSD FRML MDRD: 54 ML/MIN/1.73
GLUCOSE BLD-MCNC: 84 MG/DL (ref 70–100)
HBA1C MFR BLD: 5.5 % (ref 4.8–5.6)
HCT VFR BLD AUTO: 32 % (ref 34.5–44)
HGB BLD-MCNC: 10.2 G/DL (ref 11.5–15.5)
MCH RBC QN AUTO: 31.4 PG (ref 27–31)
MCHC RBC AUTO-ENTMCNC: 31.9 G/DL (ref 32–36)
MCV RBC AUTO: 98.5 FL (ref 80–99)
PLATELET # BLD AUTO: 175 10*3/MM3 (ref 150–450)
PMV BLD AUTO: 9.7 FL (ref 6–12)
POTASSIUM BLD-SCNC: 4.1 MMOL/L (ref 3.5–5.5)
RBC # BLD AUTO: 3.25 10*6/MM3 (ref 3.89–5.14)
SODIUM BLD-SCNC: 140 MMOL/L (ref 132–146)
TSH SERPL DL<=0.05 MIU/L-ACNC: 1.06 MIU/ML (ref 0.35–5.35)
WBC NRBC COR # BLD: 7.4 10*3/MM3 (ref 3.5–10.8)

## 2018-12-14 PROCEDURE — G0378 HOSPITAL OBSERVATION PER HR: HCPCS

## 2018-12-14 PROCEDURE — 99224 PR SBSQ OBSERVATION CARE/DAY 15 MINUTES: CPT | Performed by: INTERNAL MEDICINE

## 2018-12-14 PROCEDURE — 97530 THERAPEUTIC ACTIVITIES: CPT

## 2018-12-14 PROCEDURE — 96361 HYDRATE IV INFUSION ADD-ON: CPT

## 2018-12-14 PROCEDURE — 97166 OT EVAL MOD COMPLEX 45 MIN: CPT

## 2018-12-14 PROCEDURE — 84443 ASSAY THYROID STIM HORMONE: CPT | Performed by: INTERNAL MEDICINE

## 2018-12-14 PROCEDURE — 25010000002 HEPARIN (PORCINE) PER 1000 UNITS: Performed by: INTERNAL MEDICINE

## 2018-12-14 PROCEDURE — 85027 COMPLETE CBC AUTOMATED: CPT | Performed by: INTERNAL MEDICINE

## 2018-12-14 PROCEDURE — G8978 MOBILITY CURRENT STATUS: HCPCS

## 2018-12-14 PROCEDURE — G8979 MOBILITY GOAL STATUS: HCPCS

## 2018-12-14 PROCEDURE — 97110 THERAPEUTIC EXERCISES: CPT

## 2018-12-14 PROCEDURE — 96372 THER/PROPH/DIAG INJ SC/IM: CPT

## 2018-12-14 PROCEDURE — G8987 SELF CARE CURRENT STATUS: HCPCS

## 2018-12-14 PROCEDURE — 80048 BASIC METABOLIC PNL TOTAL CA: CPT | Performed by: INTERNAL MEDICINE

## 2018-12-14 PROCEDURE — 97162 PT EVAL MOD COMPLEX 30 MIN: CPT

## 2018-12-14 PROCEDURE — G8988 SELF CARE GOAL STATUS: HCPCS

## 2018-12-14 PROCEDURE — 82550 ASSAY OF CK (CPK): CPT | Performed by: INTERNAL MEDICINE

## 2018-12-14 PROCEDURE — 83036 HEMOGLOBIN GLYCOSYLATED A1C: CPT | Performed by: INTERNAL MEDICINE

## 2018-12-14 RX ORDER — CYCLOBENZAPRINE HCL 10 MG
20 TABLET ORAL NIGHTLY
Status: DISCONTINUED | OUTPATIENT
Start: 2018-12-14 | End: 2018-12-15 | Stop reason: HOSPADM

## 2018-12-14 RX ORDER — CALCIUM CARBONATE 200(500)MG
2 TABLET,CHEWABLE ORAL 3 TIMES DAILY PRN
Status: DISCONTINUED | OUTPATIENT
Start: 2018-12-14 | End: 2018-12-15 | Stop reason: HOSPADM

## 2018-12-14 RX ORDER — LISINOPRIL 20 MG/1
20 TABLET ORAL EVERY 12 HOURS SCHEDULED
Status: DISCONTINUED | OUTPATIENT
Start: 2018-12-14 | End: 2018-12-15 | Stop reason: HOSPADM

## 2018-12-14 RX ADMIN — HYDROCODONE BITARTRATE AND ACETAMINOPHEN 1 TABLET: 5; 325 TABLET ORAL at 06:04

## 2018-12-14 RX ADMIN — SODIUM CHLORIDE 100 ML/HR: 9 INJECTION, SOLUTION INTRAVENOUS at 05:55

## 2018-12-14 RX ADMIN — RANOLAZINE 500 MG: 500 TABLET, FILM COATED, EXTENDED RELEASE ORAL at 09:04

## 2018-12-14 RX ADMIN — ASPIRIN 81 MG 81 MG: 81 TABLET ORAL at 09:04

## 2018-12-14 RX ADMIN — CYCLOBENZAPRINE HYDROCHLORIDE 20 MG: 10 TABLET, FILM COATED ORAL at 20:41

## 2018-12-14 RX ADMIN — TRAZODONE HYDROCHLORIDE 150 MG: 100 TABLET ORAL at 20:41

## 2018-12-14 RX ADMIN — HYDROCODONE BITARTRATE AND ACETAMINOPHEN 1 TABLET: 5; 325 TABLET ORAL at 14:48

## 2018-12-14 RX ADMIN — LISINOPRIL 20 MG: 20 TABLET ORAL at 20:41

## 2018-12-14 RX ADMIN — LISINOPRIL 20 MG: 20 TABLET ORAL at 14:45

## 2018-12-14 RX ADMIN — HEPARIN SODIUM 5000 UNITS: 5000 INJECTION INTRAVENOUS; SUBCUTANEOUS at 06:04

## 2018-12-14 RX ADMIN — SODIUM CHLORIDE, PRESERVATIVE FREE 3 ML: 5 INJECTION INTRAVENOUS at 20:46

## 2018-12-14 RX ADMIN — HEPARIN SODIUM 5000 UNITS: 5000 INJECTION INTRAVENOUS; SUBCUTANEOUS at 14:45

## 2018-12-14 RX ADMIN — Medication 2 TABLET: at 20:42

## 2018-12-14 RX ADMIN — BUPROPION HYDROCHLORIDE 150 MG: 150 TABLET, FILM COATED, EXTENDED RELEASE ORAL at 09:04

## 2018-12-14 RX ADMIN — PANTOPRAZOLE SODIUM 40 MG: 40 TABLET, DELAYED RELEASE ORAL at 06:04

## 2018-12-14 RX ADMIN — HEPARIN SODIUM 5000 UNITS: 5000 INJECTION INTRAVENOUS; SUBCUTANEOUS at 20:42

## 2018-12-14 RX ADMIN — FOLIC ACID 1 MG: 1 TABLET ORAL at 20:44

## 2018-12-14 RX ADMIN — RANOLAZINE 500 MG: 500 TABLET, FILM COATED, EXTENDED RELEASE ORAL at 20:41

## 2018-12-14 RX ADMIN — CYCLOBENZAPRINE HYDROCHLORIDE 10 MG: 10 TABLET, FILM COATED ORAL at 09:04

## 2018-12-14 NOTE — PLAN OF CARE
Problem: Patient Care Overview  Goal: Plan of Care Review   12/13/18 1933   Coping/Psychosocial   Plan of Care Reviewed With patient   Plan of Care Review   Progress no change

## 2018-12-14 NOTE — PLAN OF CARE
Problem: Skin Injury Risk (Adult)  Goal: Identify Related Risk Factors and Signs and Symptoms  Outcome: Outcome(s) achieved Date Met: 12/14/18    Goal: Skin Health and Integrity  Outcome: Ongoing (interventions implemented as appropriate)      Problem: Patient Care Overview  Goal: Plan of Care Review  Outcome: Ongoing (interventions implemented as appropriate)   12/14/18 0814   Coping/Psychosocial   Plan of Care Reviewed With patient   Plan of Care Review   Progress improving   OTHER   Outcome Summary Pt doing better today, still weak and in pain but feels better, IVF d/c'd, ambulating well with minimal assistance, continue to monitor, home soon,        Problem: Pain, Acute (Adult)  Goal: Identify Related Risk Factors and Signs and Symptoms  Outcome: Outcome(s) achieved Date Met: 12/14/18    Goal: Acceptable Pain Control/Comfort Level  Outcome: Ongoing (interventions implemented as appropriate)

## 2018-12-14 NOTE — PLAN OF CARE
Problem: Patient Care Overview  Goal: Plan of Care Review  Outcome: Ongoing (interventions implemented as appropriate)   12/14/18 7044   Coping/Psychosocial   Plan of Care Reviewed With patient   OTHER   Outcome Summary PT eval completed. Pt presents with weakness, pain, and difficulty walking per PLOF. Pt limited by both pre-existing R shoulder pain and ROM deficits and new onset of neck and R hip pain. Pt able to amb 120ft with RW with CGA. PT recommends d/c home with HHPT.

## 2018-12-14 NOTE — PROGRESS NOTES
Discharge Planning Assessment  [facility]     Patient Name: Stacia Adkins  MRN: [id number]  Today's Date: 12/14/2018    Admit Date: 12/13/2018    Discharge Needs Assessment     Row Name 12/14/18 1447       Living Environment    Lives With  alone    Name(s) of Who Lives With Patient  Patient lives alone in [place].    Current Living Arrangements  home/apartment/condo    Primary Care Provided by  self    Provides Primary Care For  no one    Family Caregiver if Needed  child(erik), adult    Family Caregiver Names  Josselin    Quality of Family Relationships  helpful;involved;supportive    Able to Return to Prior Arrangements  yes       Resource/Environmental Concerns    Resource/Environmental Concerns  none    Transportation Concerns  car, none       Transition Planning    Patient/Family Anticipates Transition to  home    Patient/Family Anticipated Services at Transition  none    Transportation Anticipated  family or friend will provide       Discharge Needs Assessment    Readmission Within the Last 30 Days  no previous admission in last 30 days    Concerns to be Addressed  denies needs/concerns at this time    Equipment Currently Used at Home  nebulizer;bipap/cpap has CPAP and nebulizer but states she does not use them anymore    Anticipated Changes Related to Illness  none        Discharge Plan     Row Name 12/14/18 6118       Plan    Plan  Home alone    Patient/Family in Agreement with Plan  yes    Plan Comments  Spoke with patient at bedside.  She lives alone in a one story home in [place].  She is independent with everything.  She still drives.  Denies recent HH or rehab.  Has CPAP and a nebulizer.  States she no longer uses either one of them.  Has Rx drug coverage and uses [facility] pharmacy on [address].  Dr. Hinds is PCP.  Patient plans to return home alone.  Her daughter can transport her home and assist her at home if needed.  She also has two daughters in [place] who can assist if  needed.  She has stayed with one of them in [place] in the past after being hospitalized.  CM will follow and assess for discharge needs.       Final Discharge Disposition Code  01 - home or self-care        Destination      No service coordination in this encounter.      Durable Medical Equipment      No service coordination in this encounter.      Dialysis/Infusion      No service coordination in this encounter.      Home Medical Care      No service coordination in this encounter.      Community Resources      No service coordination in this encounter.          Demographic Summary     Row Name 12/14/18 1446       General Information    Admission Type  observation    Arrived From  emergency department    Required Notices Provided  Observation Status Notice    Reason for Consult  discharge planning    Preferred Language  English        Functional Status    No documentation.       Psychosocial    No documentation.       Abuse/Neglect    No documentation.       Legal    No documentation.       Substance Abuse    No documentation.       Patient Forms    No documentation.           Cathy Womack
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
## 2018-12-14 NOTE — PLAN OF CARE
Problem: Patient Care Overview  Goal: Plan of Care Review  Outcome: Ongoing (interventions implemented as appropriate)   12/14/18 3704   Coping/Psychosocial   Plan of Care Reviewed With patient   Plan of Care Review   Progress improving   OTHER   Outcome Summary OT eval completed Pt presents with weakness, pain, and difficulty with functional transfers and mobility limiting ADL completion compared to baseline. 120 ft functional mob with HHA required CGA no LOB. Recom IPOT d/c HHOT

## 2018-12-14 NOTE — PROGRESS NOTES
[facility] Medicine Services  PROGRESS NOTE    Patient Name: Stacia Adkins  : 1945  MRN: [id number]    Date of Admission: 2018  Length of Stay: 0  Primary Care Physician: Provider, No Known    Subjective   Subjective     CC:  Weakness     HPI:  States she is sore today. Having pain all over. Unsure if this is improved. Fell off her bed on Monday and was on the floor for 9 hours.     Review of Systems  Gen- No fevers, chills  CV- No chest pain, palpitations  Resp- No cough, dyspnea  GI- No N/V/D, abd pain    Otherwise ROS is negative except as mentioned in the HPI.    Objective   Objective     Vital Signs:   Temp:  [98 °F (36.7 °C)-98.8 °F (37.1 °C)] 98 °F (36.7 °C)  Heart Rate:  [] 73  Resp:  [16] 16  BP: ()/(57-86) 119/72        Physical Exam:  Constitutional: No acute distress, awake, alert  HENT: NCAT, mucous membranes moist   Respiratory: Clear to auscultation bilaterally, respiratory effort normal   Cardiovascular: RRR, no murmurs, rubs, or gallops, palpable pedal pulses bilaterally  Gastrointestinal: Positive bowel sounds, soft, nontender, nondistended  Musculoskeletal: No bilateral ankle edema  Psychiatric: Appropriate affect, cooperative  Neurologic: Oriented x 3, strength symmetric in all extremities, Cranial Nerves grossly intact to confrontation, speech clear  Skin: No rashes    Results Reviewed:  I have personally reviewed current lab, radiology, and data and agree.    Results from last 7 days   Lab Units  18   0421  18   1203   WBC 10*3/mm3  7.40  11.48*   HEMOGLOBIN g/dL  10.2*  14.0   HEMATOCRIT %  32.0*  43.0   PLATELETS 10*3/mm3  175  250     Results from last 7 days   Lab Units  18   0421  18   1203   SODIUM mmol/L  140  139   POTASSIUM mmol/L  4.1  3.8   CHLORIDE mmol/L  111*  103   CO2 mmol/L  22.0  23.0   BUN mg/dL  29*  30*   CREATININE mg/dL  1.01  1.04   GLUCOSE mg/dL  84  158*   CALCIUM mg/dL  8.0*  9.6   ALT  (SGPT) U/L   --   47*   AST (SGOT) U/L   --   65*     Estimated Creatinine Clearance: 42.1 mL/min (by C-G formula based on SCr of 1.01 mg/dL).  BNP   Date Value Ref Range Status   12/13/2018 100.0 0.0 - 100.0 pg/mL Final     Comment:     Results may be falsely decreased if patient taking Biotin.       Microbiology Results Abnormal     None          Imaging Results (last 24 hours)     ** No results found for the last 24 hours. **        Results for orders placed during the hospital encounter of 04/03/16   SCANNED - ECHOCARDIOGRAM       I have reviewed the medications.      aspirin 81 mg Oral Daily   buPROPion  mg Oral Daily   cyclobenzaprine 20 mg Oral Nightly   folic acid 1 mg Oral Daily   heparin (porcine) 5,000 Units Subcutaneous Q8H   lisinopril 20 mg Oral Q12H   pantoprazole 40 mg Oral QAM   ranolazine 500 mg Oral Q12H   sodium chloride 3 mL Intravenous Q12H   traZODone 150 mg Oral Nightly         Assessment/Plan   Assessment / Plan     Active Hospital Problems    Diagnosis   • **Rhabdomyolysis   • Rheumatoid arthritis involving multiple sites (CMS/[facility])   • Diastolic CHF, chronic (CMS/[facility])   • H/O aortic valve replacement          Brief Hospital Course to date:  -- Rhabdo   - related to fall and being on the ground for 9 hours  - CK improved today  - Hold fluids  - PT/OT with home with home health recommendations  - repeat CK in AM     -- RA   - continue pain medications; hold immunosuppressants     -- HTN   - resume home lisinopril     -- dHF   - Monitor; hold fluids; consider resuming diuretics in AM     DVT Prophylaxis:  Heparin     CODE STATUS:   Code Status and Medical Interventions:   Ordered at: 12/13/18 1532     Level Of Support Discussed With:    Patient     Code Status:    CPR     Medical Interventions (Level of Support Prior to Arrest):    Full       Disposition: I expect the patient to be discharged home in 1-2 days       Electronically signed by Gail Olivarez DO, 12/14/18, 2:14 PM.

## 2018-12-14 NOTE — THERAPY EVALUATION
Acute Care - Physical Therapy Initial Evaluation  [facility]     Patient Name: Stacia Adkins  : 1945  MRN: [id number]  Today's Date: 2018   Onset of Illness/Injury or Date of Surgery: 18  Date of Referral to PT: 18  Referring Physician: MD Anthony      Admit Date: 2018    Visit Dx:     ICD-10-CM ICD-9-CM   1. Shortness of breath R06.02 786.05   2. Stress response F43.0 308.9   3. Traumatic rhabdomyolysis, initial encounter (CMS/[facility]) T79.6XXA 958.6   4. Fall in home, initial encounter W19.XXXA E888.9    Y92.009 E849.0   5. Impaired functional mobility, balance, gait, and endurance Z74.09 V49.89     Patient Active Problem List   Diagnosis   • Rhabdomyolysis   • Rheumatoid arthritis involving multiple sites (CMS/[facility])   • Diastolic CHF, chronic (CMS/[facility])   • H/O aortic valve replacement     Past Medical History:   Diagnosis Date   • Arthritis, rheumatoid (CMS/[facility])    • Back pain    • CHF (congestive heart failure) (CMS/[facility])      Past Surgical History:   Procedure Laterality Date   • BACK SURGERY     • CARDIAC VALVE SURGERY     • SHOULDER SURGERY          PT ASSESSMENT (last 12 hours)      Physical Therapy Evaluation     Row Name 18 0820          PT Evaluation Time/Intention    Subjective Information  complains of;weakness;fatigue;pain  -SJ     Document Type  evaluation  -SJ     Mode of Treatment  individual therapy  -SJ     Patient Effort  excellent  -SJ     Symptoms Noted During/After Treatment  increased pain  -SJ     Comment  RN aware  -SJ     Row Name 18 0820          General Information    Patient Profile Reviewed?  yes  -SJ     Onset of Illness/Injury or Date of Surgery  18  -SJ     Referring Physician  MD Anthony  -SJ     Patient Observations  alert;cooperative;agree to therapy  -SJ     Patient/Family Observations  no family present  -SJ     General Observations of Patient  Pt sitting up in bed, awake, alert  -SJ     Prior Level of Function  independent:;all  household mobility;community mobility;gait;transfer;bed mobility;ADL's;using stairs;work  -SJ     Equipment Currently Used at Home  none  -SJ     Pertinent History of Current Functional Problem  73 y.o.F presented to ED by EMS due to SOA and weakness. Pt had a fall at home several days ago, in which her head was stuck behind the bed and she was down x8hrs. Pt presented to PCP soon after. They called her and told her that her blood work was concerning for rhabdo. Later, pt became weak while at [facility] and EMS brought pt to ED. pt due for R shoulder replacement   -SJ     Existing Precautions/Restrictions  fall  -SJ     Risks Reviewed  patient:;LOB;dizziness;increased discomfort;change in vital signs  -SJ     Benefits Reviewed  patient:;improve function;increase independence;increase strength;increase balance;decrease pain;increase knowledge  -SJ     Barriers to Rehab  none identified  -SJ     Row Name 12/14/18 0820          Relationship/Environment    Primary Source of Support/Comfort  child(erik)  -SJ     Lives With  alone  -SJ     Family Caregiver if Needed  none  -SJ     Primary Roles/Responsibilities  caregiver for other(s);wage earner, part time  -SJ     Concerns About Impact on Relationships  Pt states most of her children live out of town. Pt is very active at baseline, and works as a part-time caregiver at [employer] as well as had a rome at the [employer].  -SJ     Row Name 12/14/18 0820          Resource/Environmental Concerns    Current Living Arrangements  home/apartment/condo  -     Resource/Environmental Concerns  none  -SJ     Row Name 12/14/18 0820          Home Main Entrance    Number of Stairs, Main Entrance  three  -SJ     Stair Railings, Main Entrance  railings on both sides of stairs  -SJ     Stairs Comment, Main Entrance  1 story home, 3 MARY  -SJ     Row Name 12/14/18 0820          Cognitive Assessment/Intervention- PT/OT    Orientation Status (Cognition)  oriented x 4  -SJ     Follows  Commands (Cognition)  WNL  -     Row Name 12/14/18 0820          Safety Issues, Functional Mobility    Impairments Affecting Function (Mobility)  balance;pain;strength;range of motion (ROM)  -     Row Name 12/14/18 0820          Bed Mobility Assessment/Treatment    Bed Mobility Assessment/Treatment  supine-sit  -     Supine-Sit [place] (Bed Mobility)  minimum assist (75% patient effort);verbal cues  -     Bed Mobility, Safety Issues  decreased use of arms for pushing/pulling  -     Assistive Device (Bed Mobility)  draw sheet;head of bed elevated  -     Comment (Bed Mobility)  limited by R shoulder pain and decr. ROM  -     Row Name 12/14/18 0820          Transfer Assessment/Treatment    Transfer Assessment/Treatment  sit-stand transfer;stand-sit transfer  -     Comment (Transfers)  extra effort required  -     Sit-Stand [place] (Transfers)  supervision  -     Stand-Sit [place] (Transfers)  supervision  -[facility] Name 12/14/18 0820          Gait/Stairs Assessment/Training    [place] Level (Gait)  contact guard;verbal cues  -     Assistive Device (Gait)  walker, front-wheeled  -     Distance in Feet (Gait)  120  -SJ     Pattern (Gait)  step-through  -SJ     Deviations/Abnormal Patterns (Gait)  antalgic;barbie decreased;gait speed decreased  -SJ     Bilateral Gait Deviations  forward flexed posture  -SJ     Comment (Gait/Stairs)  slow gait speed, increased pain with mobility  -     Row Name 12/14/18 0820          General ROM    GENERAL ROM COMMENTS  BLE's WFL, cervical ROM limited by 50% due to pain in all planes  -[facility] Name 12/14/18 0820          MMT (Manual Muscle Testing)    General MMT Comments  RLE 4/5, LLE 5/5  -     Row Name 12/14/18 0820          Motor Assessment/Intervention    Additional Documentation  Balance (Group);Therapeutic Exercise (Group)  -     Row Name 12/14/18 0820          Therapeutic Exercise    Therapeutic Exercise  seated, lower  extremities;seated, upper extremities  -     Additional Documentation  Therapeutic Exercise (Row)  -     24306 - PT Therapeutic Exercise Minutes  15  -     Row Name 12/14/18 0820          Lower Extremity Seated Therapeutic Exercise    Performed, Seated Lower Extremity (Therapeutic Exercise)  hip flexion/extension;ankle dorsiflexion/plantarflexion;knee flexion/extension;hip abduction/adduction;other (see comments) cervical AROM in all planes  -     Exercise Type, Seated Lower Extremity (Therapeutic Exercise)  AROM (active range of motion)  -     Expected Outcomes, Seated Lower Extremity (Therapeutic Exercise)  strengthen normal movement patterns  -     Sets/Reps Detail, Seated Lower Extremity (Therapeutic Exercise)  15  -     Row Name 12/14/18 0820          Balance    Balance  static sitting balance;static standing balance;dynamic sitting balance  -     Row Name 12/14/18 0820          Static Sitting Balance    Level of [place] (Unsupported Sitting, Static Balance)  conditional independence  -     Sitting Position (Unsupported Sitting, Static Balance)  sitting on edge of bed  -     Row Name 12/14/18 0820          Dynamic Sitting Balance    Level of [place], Reaches Outside Midline (Sitting, Dynamic Balance)  supervision  -     Sitting Position, Reaches Outside Midline (Sitting, Dynamic Balance)  sitting on edge of bed  -     Row Name 12/14/18 0820          Static Standing Balance    Level of [place] (Supported Standing, Static Balance)  standby assist  -     Row Name 12/14/18 0820          Pain Assessment    Additional Documentation  Pain Scale: Numbers Pre/Post-Treatment (Group)  -     Row Name 12/14/18 0820          Pain Scale: Numbers Pre/Post-Treatment    Pain Scale: Numbers, Pretreatment  9/10  -     Pain Scale: Numbers, Post-Treatment  9/10  -     Pain Location - Side  Bilateral  -     Pain Location - Orientation  posterior  -     Pain Location  neck  -      Pain Intervention(s)  Repositioned;Ambulation/increased activity;Medication (See MAR) discussed with RN regarding heat and integrative care  -     Row Name 12/14/18 0820          Coping    Observed Emotional State  accepting;calm;cooperative;pleasant  -     Row Name 12/14/18 0820          Plan of Care Review    Plan of Care Reviewed With  patient  -     Row Name 12/14/18 0820          Physical Therapy Clinical Impression    Date of Referral to PT  12/13/18  -     PT Diagnosis (PT Clinical Impression)  impaired mobility  -     Patient/Family Goals Statement (PT Clinical Impression)  return to PLOF  -     Criteria for Skilled Interventions Met (PT Clinical Impression)  yes;treatment indicated  -     Pathology/Pathophysiology Noted (Describe Specifically for Each System)  musculoskeletal  -     Impairments Found (describe specific impairments)  ROM;gait, locomotion, and balance  -     Functional Limitations in Following Categories (Describe Specific Limitations)  self-care;home management;work;community/leisure  -     Rehab Potential (PT Clinical Summary)  good, to achieve stated therapy goals  -     Predicted Duration of Therapy (PT)  2wks  -     Care Plan Review (PT)  evaluation/treatment results reviewed;care plan/treatment goals reviewed;risks/benefits reviewed;current/potential barriers reviewed;patient/other agree to care plan  -     Row Name 12/14/18 0820          Vital Signs    Pre Systolic BP Rehab  119  -SJ     Pre Treatment Diastolic BP  72  -SJ     Pretreatment Heart Rate (beats/min)  81  -SJ     Pre SpO2 (%)  97  -SJ     O2 Delivery Pre Treatment  room air  -     Row Name 12/14/18 0820          Physical Therapy Goals    Bed Mobility Goal Selection (PT)  bed mobility, PT goal 1  -SJ     Transfer Goal Selection (PT)  transfer, PT goal 1  -SJ     Gait Training Goal Selection (PT)  gait training, PT goal 1  -SJ     Stairs Goal Selection (PT)  stairs, PT goal 1  -SJ     Additional  Documentation  Stairs Goal Selection (PT) (Row)  -SJ     Row Name 12/14/18 0820          Bed Mobility Goal 1 (PT)    Activity/Assistive Device (Bed Mobility Goal 1, PT)  sit to supine/supine to sit  -SJ     [place] Level/Cues Needed (Bed Mobility Goal 1, PT)  conditional independence  -SJ     Time Frame (Bed Mobility Goal 1, PT)  2 weeks;long term goal (LTG)  -SJ     Row Name 12/14/18 0820          Transfer Goal 1 (PT)    Activity/Assistive Device (Transfer Goal 1, PT)  sit-to-stand/stand-to-sit;bed-to-chair/chair-to-bed  -SJ     [place] Level/Cues Needed (Transfer Goal 1, PT)  conditional independence  -SJ     Time Frame (Transfer Goal 1, PT)  2 weeks;long term goal (LTG)  -SJ     Row Name 12/14/18 0820          Gait Training Goal 1 (PT)    Activity/Assistive Device (Gait Training Goal 1, PT)  walker, rolling  -SJ     [place] Level (Gait Training Goal 1, PT)  conditional independence  -SJ     Distance (Gait Goal 1, PT)  300  -SJ     Time Frame (Gait Training Goal 1, PT)  2 weeks;long term goal (LTG)  -SJ     Row Name 12/14/18 0820          Stairs Goal 1 (PT)    Activity/Assistive Device (Stairs Goal 1, PT)  ascending stairs;descending stairs  -SJ     [place] Level/Cues Needed (Stairs Goal 1, PT)  supervision required  -SJ     Number of Stairs (Stairs Goal 1, PT)  3  -SJ     Time Frame (Stairs Goal 1, PT)  2 weeks;long term goal (LTG)  -SJ     Row Name 12/14/18 0820          Positioning and Restraints    Pre-Treatment Position  in bed  -SJ     Post Treatment Position  chair  -SJ     In Chair  reclined;call light within reach;encouraged to call for assist;exit alarm on;with nsg;heels elevated  -SJ     Row Name 12/14/18 0820          Living Environment    Home Accessibility  stairs to enter home  -SJ       User Key  (r) = Recorded By, (t) = Taken By, (c) = Cosigned By    Initials Name Provider Type    Romana Short PT Physical Therapist        Physical Therapy Education     Title: PT OT SLP  Therapies (In Progress)     Topic: Physical Therapy (In Progress)     Point: Mobility training (In Progress)     Learning Progress Summary           Patient Eager, E,D, NR by  at 12/14/2018  9:48 AM                   Point: Home exercise program (In Progress)     Learning Progress Summary           Patient Eager, E,D, NR by  at 12/14/2018  9:48 AM                   Point: Body mechanics (In Progress)     Learning Progress Summary           Patient Eager, E,D, NR by  at 12/14/2018  9:48 AM                   Point: Precautions (In Progress)     Learning Progress Summary           Patient Eager, E,D, NR by  at 12/14/2018  9:48 AM                               User Key     Initials Effective Dates Name Provider Type Discipline     06/19/15 -  Romana Gibbons, PT Physical Therapist PT              PT Recommendation and Plan  Anticipated Discharge Disposition (PT): home with home health  Planned Therapy Interventions (PT Eval): balance training, bed mobility training, gait training, home exercise program, patient/family education, stair training, strengthening, transfer training  Therapy Frequency (PT Clinical Impression): daily  Outcome Summary/Treatment Plan (PT)  Anticipated Equipment Needs at Discharge (PT): front wheeled walker  Anticipated Discharge Disposition (PT): home with home health  Plan of Care Reviewed With: patient  Outcome Summary: PT eval completed. Pt presents with weakness, pain, and difficulty walking per PLOF. Pt limited by both pre-existing R shoulder pain and ROM deficits and new onset of neck and R hip pain. Pt able to amb 120ft with RW with CGA. PT recommends d/c home with HHPT.  Outcome Measures     Row Name 12/14/18 0820             How much help from another person do you currently need...    Turning from your back to your side while in flat bed without using bedrails?  4  -SJ      Moving from lying on back to sitting on the side of a flat bed without bedrails?  3  -SJ      Moving  to and from a bed to a chair (including a wheelchair)?  3  -SJ      Standing up from a chair using your arms (e.g., wheelchair, bedside chair)?  4  -SJ      Climbing 3-5 steps with a railing?  3  -SJ      To walk in hospital room?  3  -SJ      AM-PAC 6 Clicks Score  20  -SJ         Functional Assessment    Outcome Measure Options  AM-PAC 6 Clicks Basic Mobility (PT)  -SJ        User Key  (r) = Recorded By, (t) = Taken By, (c) = Cosigned By    Initials Name Provider Type    Romana Short, PT Physical Therapist         Time Calculation:   PT Charges     Row Name 12/14/18 0949 12/14/18 0820          Time Calculation    Start Time  0820  -  --     PT Received On  12/14/18  -  --     PT Goal Re-Cert Due Date  12/24/18  -  --        Time Calculation- PT    Total Timed Code Minutes- PT  15 minute(s)  -  --        Timed Charges    54166 - PT Therapeutic Exercise Minutes  --  15  -SJ       User Key  (r) = Recorded By, (t) = Taken By, (c) = Cosigned By    Initials Name Provider Type    Romana Short, PT Physical Therapist        Therapy Suggested Charges     Code   Minutes Charges    53992 (CPT®) Hc Pt Neuromusc Re Education Ea 15 Min      74693 (CPT®) Hc Pt Ther Proc Ea 15 Min 15 1    72586 (CPT®) Hc Gait Training Ea 15 Min      46223 (CPT®) Hc Pt Therapeutic Act Ea 15 Min      82853 (CPT®) Hc Pt Manual Therapy Ea 15 Min      07914 (CPT®) Hc Pt Iontophoresis Ea 15 Min      77892 (CPT®) Hc Pt Elec Stim Ea-Per 15 Min      90231 (CPT®) Hc Pt Ultrasound Ea 15 Min      95707 (CPT®) Hc Pt Self Care/Mgmt/Train Ea 15 Min      36531 (CPT®) Hc Pt Prosthetic (S) Train Initial Encounter, Each 15 Min      68901 (CPT®) Hc Pt Orthotic(S)/Prosthetic(S) Encounter, Each 15 Min      02104 (CPT®) Hc Orthotic(S) Mgmt/Train Initial Encounter, Each 15min      Total  15 1        Therapy Charges for Today     Code Description Service Date Service Provider Modifiers Qty    90320527169 HC PT THER PROC EA 15 MIN 12/14/2018 Shai  Romana, PT GP 1    02393105566 HC PT EVAL MOD COMPLEXITY 4 12/14/2018 Romana Gibbons, PT GP 1          PT G-Codes  Outcome Measure Options: AM-PAC 6 Clicks Basic Mobility (PT)  AM-PAC 6 Clicks Score: 20      Romana Gibbons, PT  12/14/2018

## 2018-12-15 VITALS
OXYGEN SATURATION: 98 % | HEART RATE: 75 BPM | BODY MASS INDEX: 26.06 KG/M2 | HEIGHT: 61 IN | RESPIRATION RATE: 18 BRPM | TEMPERATURE: 98.7 F | DIASTOLIC BLOOD PRESSURE: 82 MMHG | WEIGHT: 138 LBS | SYSTOLIC BLOOD PRESSURE: 124 MMHG

## 2018-12-15 LAB
ANION GAP SERPL CALCULATED.3IONS-SCNC: 6 MMOL/L (ref 3–11)
BUN BLD-MCNC: 20 MG/DL (ref 9–23)
BUN/CREAT SERPL: 24.1 (ref 7–25)
CALCIUM SPEC-SCNC: 9.1 MG/DL (ref 8.7–10.4)
CHLORIDE SERPL-SCNC: 110 MMOL/L (ref 99–109)
CK SERPL-CCNC: 161 U/L (ref 26–174)
CO2 SERPL-SCNC: 22 MMOL/L (ref 20–31)
CREAT BLD-MCNC: 0.83 MG/DL (ref 0.6–1.3)
GFR SERPL CREATININE-BSD FRML MDRD: 67 ML/MIN/1.73
GLUCOSE BLD-MCNC: 96 MG/DL (ref 70–100)
POTASSIUM BLD-SCNC: 4.5 MMOL/L (ref 3.5–5.5)
SODIUM BLD-SCNC: 138 MMOL/L (ref 132–146)

## 2018-12-15 PROCEDURE — 25010000002 HEPARIN (PORCINE) PER 1000 UNITS: Performed by: INTERNAL MEDICINE

## 2018-12-15 PROCEDURE — 96372 THER/PROPH/DIAG INJ SC/IM: CPT

## 2018-12-15 PROCEDURE — G0378 HOSPITAL OBSERVATION PER HR: HCPCS

## 2018-12-15 PROCEDURE — 82550 ASSAY OF CK (CPK): CPT | Performed by: INTERNAL MEDICINE

## 2018-12-15 PROCEDURE — 80048 BASIC METABOLIC PNL TOTAL CA: CPT | Performed by: INTERNAL MEDICINE

## 2018-12-15 PROCEDURE — 99217 PR OBSERVATION CARE DISCHARGE MANAGEMENT: CPT | Performed by: INTERNAL MEDICINE

## 2018-12-15 RX ORDER — SPIRONOLACTONE 25 MG/1
25 TABLET ORAL DAILY
Start: 2018-12-17

## 2018-12-15 RX ORDER — FUROSEMIDE 40 MG/1
40 TABLET ORAL DAILY
Start: 2018-12-17

## 2018-12-15 RX ADMIN — RANOLAZINE 500 MG: 500 TABLET, FILM COATED, EXTENDED RELEASE ORAL at 08:28

## 2018-12-15 RX ADMIN — HYDROCODONE BITARTRATE AND ACETAMINOPHEN 1 TABLET: 5; 325 TABLET ORAL at 05:40

## 2018-12-15 RX ADMIN — SODIUM CHLORIDE, PRESERVATIVE FREE 3 ML: 5 INJECTION INTRAVENOUS at 08:29

## 2018-12-15 RX ADMIN — BUPROPION HYDROCHLORIDE 150 MG: 150 TABLET, FILM COATED, EXTENDED RELEASE ORAL at 08:29

## 2018-12-15 RX ADMIN — PANTOPRAZOLE SODIUM 40 MG: 40 TABLET, DELAYED RELEASE ORAL at 05:40

## 2018-12-15 RX ADMIN — HEPARIN SODIUM 5000 UNITS: 5000 INJECTION INTRAVENOUS; SUBCUTANEOUS at 05:41

## 2018-12-15 RX ADMIN — LISINOPRIL 20 MG: 20 TABLET ORAL at 08:29

## 2018-12-15 RX ADMIN — HYDROCODONE BITARTRATE AND ACETAMINOPHEN 1 TABLET: 5; 325 TABLET ORAL at 13:27

## 2018-12-15 NOTE — DISCHARGE SUMMARY
[facility] Medicine Services  DISCHARGE SUMMARY    Patient Name: Stacia Adkins  : 1945  MRN: [id number]    Date of Admission: 2018  Date of Discharge:  12/15/18  Primary Care Physician: Provider, No Known    Consults     No orders found from 2018 to 2018.        Hospital Course     Presenting Problem:   Rhabdomyolysis [M62.82]    Active Hospital Problems    Diagnosis Date Noted   • **Rhabdomyolysis [M62.82] 2018   • Rheumatoid arthritis involving multiple sites (CMS/[facility]) [M06.9] 2018   • Diastolic CHF, chronic (CMS/[facility]) [I50.32] 2018   • H/O aortic valve replacement [Z95.2] 2018      Resolved Hospital Problems   No resolved problems to display.          Hospital Course:  - Rhabdomyolysis   - related to fall and being on the ground for 9 hours  - CK improved during admission and normal on discharge  - fluids initially and discontinued on day 2 of hospitalization. Continued to have improvement in CK. Kidney function remained stable   - PT/OT with home with home health recommendations     -- RA   - continued pain medications; resume medications per rheumatology on discharge      -- HTN   - resumed home lisinopril      -- dHF   - Euvolemic while inpatient. Diuretics held due to the above. Kidney function remained stable. Recommend resuming diuretics in 2 days as the above continues to resolve      Discharge Follow Up Recommendations for labs/diagnostics:  PCP 1 week     Day of Discharge     HPI:   States she is feeling better today. Walked to the bathroom with no assist. Pain is improved. Feels ready and comfortable with discharge     Review of Systems  Gen- No fevers, chills  CV- No chest pain, palpitations  Resp- No cough, dyspnea  GI- No N/V/D, abd pain    Otherwise ROS is negative except as mentioned in the HPI.    Vital Signs:   Temp:  [97.6 °F (36.4 °C)-98.7 °F (37.1 °C)] 98.7 °F (37.1 °C)  Heart Rate:  [71-84] 84  Resp:  [18] 18  BP:  (124-140)/(66-82) 124/82     Physical Exam:  Constitutional: No acute distress, awake, alert  HENT: NCAT, mucous membranes moist  Respiratory: Clear to auscultation bilaterally, respiratory effort normal   Cardiovascular: RRR, no murmurs, rubs, or gallops, palpable pedal pulses bilaterally  Gastrointestinal: Positive bowel sounds, soft, nontender, nondistended  Musculoskeletal: trace lower leg edema; painful with light touch   Psychiatric: Appropriate affect, cooperative  Neurologic: Oriented x 3, strength symmetric in all extremities, Cranial Nerves grossly intact to confrontation, speech clear  Skin: No rashes      Pertinent  and/or Most Recent Results     Results from last 7 days   Lab Units  12/15/18   0532  12/14/18   0421  12/13/18   1203   WBC 10*3/mm3   --   7.40  11.48*   HEMOGLOBIN g/dL   --   10.2*  14.0   HEMATOCRIT %   --   32.0*  43.0   PLATELETS 10*3/mm3   --   175  250   SODIUM mmol/L  138  140  139   POTASSIUM mmol/L  4.5  4.1  3.8   CHLORIDE mmol/L  110*  111*  103   CO2 mmol/L  22.0  22.0  23.0   BUN mg/dL  20  29*  30*   CREATININE mg/dL  0.83  1.01  1.04   GLUCOSE mg/dL  96  84  158*   CALCIUM mg/dL  9.1  8.0*  9.6     Results from last 7 days   Lab Units  12/13/18   1203   BILIRUBIN mg/dL  0.7   ALK PHOS U/L  139*   ALT (SGPT) U/L  47*   AST (SGOT) U/L  65*           Invalid input(s): TG, LDLCALC, LDLREALC  Results from last 7 days   Lab Units  12/14/18   0421  12/13/18   1203   TSH mIU/mL  1.057   --    HEMOGLOBIN A1C %  5.50   --    BNP pg/mL   --   100.0     Brief Urine Lab Results     None          Microbiology Results Abnormal     None          Imaging Results (all)     Procedure Component Value Units Date/Time    XR Chest 1 View [[id number]] Collected:  12/13/18 1258     Updated:  12/13/18 1317    Narrative:       EXAMINATION: XR CHEST 1 VW- 12/13/2018     INDICATION: SOA triage protocol      COMPARISON: 04/20/2017     FINDINGS: The cardiac silhouette is normal. There is no  acute  inflammatory process. There is no mass or effusion.           Impression:       No active disease.     D:  12/13/2018  E:  12/13/2018     This report was finalized on 12/13/2018 1:15 PM by Dr. Armaan Martin MD.       CT Head Without Contrast [[id number]] Collected:  12/13/18 1258     Updated:  12/13/18 1316    Narrative:       EXAMINATION: CT HEAD WO CONTRAST- 12/13/2018     INDICATION: Fall on Monday with head injury and headache         TECHNIQUE: CT scan of the head was performed at 5 mm intervals. No  intravenous contrast was utilized.     The radiation dose reduction device was turned on for each scan per the  ALARA (As Low as Reasonably Achievable) protocol.     COMPARISON: NONE     FINDINGS: There is no intracranial mass. There is no hemorrhage. There  is no midline shift or extra-axial fluid collection.       Impression:       Normal unenhanced CT scan of the head.     D:  12/13/2018  E:  12/13/2018        This report was finalized on 12/13/2018 1:14 PM by Dr. Armaan Martin MD.                       Results for orders placed during the hospital encounter of 04/03/16   SCANNED - ECHOCARDIOGRAM         Discharge Details        Discharge Medications      Changes to Medications      Instructions Start Date   furosemide 40 MG tablet  Commonly known as:  LASIX  What changed:  These instructions start on 12/17/2018. If you are unsure what to do until then, ask your doctor or other care provider.   40 mg, Oral, Daily   Start Date:  12/17/2018     spironolactone 25 MG tablet  Commonly known as:  ALDACTONE  What changed:  These instructions start on 12/17/2018. If you are unsure what to do until then, ask your doctor or other care provider.   25 mg, Oral, Daily   Start Date:  12/17/2018        Continue These Medications      Instructions Start Date   aspirin 81 MG chewable tablet   81 mg, Oral, Nightly      buPROPion  MG 24 hr tablet  Commonly known as:  WELLBUTRIN XL   150 mg, Oral, Daily       cyclobenzaprine 10 MG tablet  Commonly known as:  FLEXERIL   20 mg, Oral, Nightly      folic acid 1 MG tablet  Commonly known as:  FOLVITE   1 mg, Oral, Nightly      HYDROcodone-acetaminophen  MG per tablet  Commonly known as:  NORCO   1 tablet, Oral, 2 Times Daily PRN      lisinopril 20 MG tablet  Commonly known as:  PRINIVIL,ZESTRIL   20 mg, Oral, 2 Times Daily      methotrexate 2.5 MG tablet   17.5 mg, Oral, Weekly, On Sundays.      omeprazole 20 MG capsule  Commonly known as:  priLOSEC   20 mg, Oral, Daily      ranolazine 500 MG 12 hr tablet  Commonly known as:  RANEXA   500 mg, Oral, Daily      traZODone 100 MG tablet  Commonly known as:  DESYREL   150 mg, Oral, Nightly               Discharge Disposition:  Home or Self Care    Discharge Diet:  Diet Instructions     Diet: Cardiac      Discharge Diet:  Cardiac          Discharge Activity:   Activity Instructions     Activity as Tolerated                  Code Status/Level of Support:  Code Status and Medical Interventions:   Ordered at: 12/13/18 1532     Level Of Support Discussed With:    Patient     Code Status:    CPR     Medical Interventions (Level of Support Prior to Arrest):    Full       No future appointments.    Additional Instructions for the Follow-ups that You Need to Schedule     Discharge Follow-up with PCP   As directed       Currently Documented PCP:    Provider, No Known    PCP Phone Number:    [phone]     Follow Up Details:  PCP 1 week               Time Spent on Discharge:  30 minutes    Electronically signed by Gail Olivarez DO, 12/15/18, 9:54 AM.

## 2018-12-15 NOTE — PLAN OF CARE
"Problem: Fall Risk (Adult)  Goal: Identify Related Risk Factors and Signs and Symptoms  Outcome: Ongoing (interventions implemented as appropriate)    Goal: Absence of Fall  Outcome: Ongoing (interventions implemented as appropriate)      Problem: Skin Injury Risk (Adult)  Goal: Skin Health and Integrity  Outcome: Ongoing (interventions implemented as appropriate)      Problem: Patient Care Overview  Goal: Individualization and Mutuality  Outcome: Ongoing (interventions implemented as appropriate)   12/15/18 0327   Individualization   Patient Specific Preferences pain control,tums, quiet room, to ambulate multiple times per day   Patient Specific Goals (Include Timeframe) to go home, she has \"a lot to do at home\"   Patient Specific Interventions frequent checks, vs q4   Mutuality/Individual Preferences   What Anxieties, Fears, Concerns, or Questions Do You Have About Your Care? none   What Information Would Help Us Give You More Personalized Care? none   How Would You and/or Your Support Person Like to Participate in Your Care? none   Mutuality/Individual Preferences   How to Address Anxieties/Fears none     Goal: Discharge Needs Assessment  Outcome: Ongoing (interventions implemented as appropriate)      Problem: Pain, Acute (Adult)  Goal: Acceptable Pain Control/Comfort Level  Outcome: Ongoing (interventions implemented as appropriate)        "

## 2018-12-16 ENCOUNTER — READMISSION MANAGEMENT (OUTPATIENT)
Dept: CALL CENTER | Facility: HOSPITAL | Age: 73
End: 2018-12-16

## 2018-12-16 NOTE — OUTREACH NOTE
Prep Survey      Responses   Facility patient discharged from?  [place]   Is patient eligible?  Yes   Discharge diagnosis  Rhabdomyolysis, rheumatoid arthritis involving multiple sites, Diastolic CHF, chronic, h/o aortic valve replacement   Does the patient have one of the following disease processes/diagnoses(primary or secondary)?  Other   Does the patient have Home health ordered?  No   Is there a DME ordered?  No   Comments regarding appointments  Pt to call Monday to schedule appointment.   Prep survey completed?  Yes          Danika Ordaz RN

## 2018-12-17 NOTE — THERAPY DISCHARGE NOTE
Acute Care - Occupational Therapy Discharge Summary  [facility]     Patient Name: Stacia Adkins  : 1945  MRN: [id number]    Today's Date: 2018  Onset of Illness/Injury or Date of Surgery: 18    Date of Referral to OT: 18  Referring Physician: MD Anthony      Admit Date: 2018        OT Recommendation and Plan    Visit Dx:    ICD-10-CM ICD-9-CM   1. Shortness of breath R06.02 786.05   2. Stress response F43.0 308.9   3. Traumatic rhabdomyolysis, initial encounter (CMS/[facility]) T79.6XXA 958.6   4. Fall in home, initial encounter W19.XXXA E888.9    Y92.009 E849.0   5. Impaired functional mobility, balance, gait, and endurance Z74.09 V49.89   6. Impaired mobility and ADLs Z74.09 799.89               Rehab Goal Summary     Row Name 18 1446             Bed Mobility Goal 1 (OT)    Progress/Outcomes (Bed Mobility Goal 1, OT)  goal not met;discharged from facility  -SHARYN         Transfer Goal 1 (OT)    Progress/Outcome (Transfer Goal 1, OT)  goal not met;discharged from facility  -SHARYN         Toileting Goal 1 (OT)    Progress/Outcome (Toileting Goal 1, OT)  goal not met;discharged from facility  -SHARYN        User Key  (r) = Recorded By, (t) = Taken By, (c) = Cosigned By    Initials Name Provider Type Discipline    Wanda Kline, OT Occupational Therapist OT              Therapy Suggested Charges     Code   Minutes Charges    98378 (CPT®) Hc Ot Neuromusc Re Education Ea 15 Min      93050 (CPT®) Hc Ot Ther Proc Ea 15 Min      50981 (CPT®) Hc Ot Therapeutic Act Ea 15 Min 10 1    82010 (CPT®) Hc Ot Manual Therapy Ea 15 Min      00514 (CPT®) Hc Ot Iontophoresis Ea 15 Min      19773 (CPT®) Hc Ot Elec Stim Ea-Per 15 Min      49774 (CPT®) Hc Ot Ultrasound Ea 15 Min      52691 (CPT®) Hc Ot Self Care/Mgmt/Train Ea 15 Min      Total  10 1              OT Discharge Summary  Reason for Discharge: Discharge from facility  Outcomes Achieved: Other(No goals met, pt. seen for evaluation only.)  Discharge  Destination: Home with assist      Wanda Stephens, OT  12/17/2018

## 2018-12-18 ENCOUNTER — READMISSION MANAGEMENT (OUTPATIENT)
Dept: CALL CENTER | Facility: HOSPITAL | Age: 73
End: 2018-12-18

## 2018-12-18 NOTE — OUTREACH NOTE
Medical Week 1 Survey      Responses   Facility patient discharged from?  [place]   Does the patient have one of the following disease processes/diagnoses(primary or secondary)?  Other   Is there a successful TCM telephone encounter documented?  No   Week 1 attempt successful?  No   Unsuccessful attempts  Attempt 1          Adelfo Atwood RN

## 2018-12-19 ENCOUNTER — READMISSION MANAGEMENT (OUTPATIENT)
Dept: CALL CENTER | Facility: HOSPITAL | Age: 73
End: 2018-12-19

## 2018-12-19 NOTE — OUTREACH NOTE
Medical Week 1 Survey      Responses   Facility patient discharged from?  [place]   Does the patient have one of the following disease processes/diagnoses(primary or secondary)?  Other   Is there a successful TCM telephone encounter documented?  No   Week 1 attempt successful?  Yes   Call start time  1136   Call end time  1146   Discharge diagnosis  Rhabdomyolysis, rheumatoid arthritis involving multiple sites, Diastolic CHF, chronic, h/o aortic valve replacement   Meds reviewed with patient/caregiver?  Yes   Is the patient having any side effects they believe may be caused by any medication additions or changes?  No   Does the patient have all medications ordered at discharge?  Yes   Is the patient taking all medications as directed (includes completed medication regime)?  Yes   Does the patient have a primary care provider?   Yes   Does the patient have an appointment with their PCP within 7 days of discharge?  Yes   Comments regarding PCP  Dr Hinds/ Appt on Dec 19 2018   Has the patient kept scheduled appointments due by today?  Yes   Has home health visited the patient within 72 hours of discharge?  N/A   Psychosocial issues?  No   Comments  Patient reports she is doing wonderful.    Did the patient receive a copy of their discharge instructions?  Yes   Nursing interventions  Reviewed instructions with patient   What is the patient's perception of their health status since discharge?  Improving   Is the patient/caregiver able to teach back signs and symptoms related to disease process for when to call PCP?  Yes   Is the patient/caregiver able to teach back signs and symptoms related to disease process for when to call 911?  Yes   Is the patient/caregiver able to teach back the hierarchy of who to call/visit for symptoms/problems? PCP, Specialist, Home health nurse, Urgent Care, ED, 911  Yes   Week 1 call completed?  Yes          Randy Thomson RN

## 2021-04-11 ENCOUNTER — APPOINTMENT (OUTPATIENT)
Dept: PREADMISSION TESTING | Facility: HOSPITAL | Age: 76
End: 2021-04-11

## 2021-04-11 PROCEDURE — C9803 HOPD COVID-19 SPEC COLLECT: HCPCS

## 2021-04-11 PROCEDURE — U0004 COV-19 TEST NON-CDC HGH THRU: HCPCS

## 2021-04-11 PROCEDURE — U0005 INFEC AGEN DETEC AMPLI PROBE: HCPCS

## 2021-04-12 ENCOUNTER — HOSPITAL ENCOUNTER (OUTPATIENT)
Dept: CARDIOLOGY | Facility: HOSPITAL | Age: 76
Discharge: HOME | End: 2021-04-12
Admitting: OTOLARYNGOLOGY

## 2021-04-12 ENCOUNTER — TRANSCRIBE ORDERS (OUTPATIENT)
Dept: CARDIOLOGY | Facility: HOSPITAL | Age: 76
End: 2021-04-12

## 2021-04-12 DIAGNOSIS — Z01.812 PRE-OPERATIVE LABORATORY EXAMINATION: Primary | ICD-10-CM

## 2021-04-12 DIAGNOSIS — Z01.812 PRE-OPERATIVE LABORATORY EXAMINATION: ICD-10-CM

## 2021-04-12 LAB
QT INTERVAL: 436 MS
QTC INTERVAL: 480 MS
SARS-COV-2 RNA NOSE QL NAA+PROBE: NOT DETECTED

## 2021-04-12 PROCEDURE — 93010 ELECTROCARDIOGRAM REPORT: CPT | Performed by: INTERNAL MEDICINE

## 2021-04-12 PROCEDURE — 93005 ELECTROCARDIOGRAM TRACING: CPT | Performed by: OTOLARYNGOLOGY

## 2021-04-13 ENCOUNTER — LAB REQUISITION (OUTPATIENT)
Dept: LAB | Facility: HOSPITAL | Age: 76
End: 2021-04-13

## 2021-04-13 DIAGNOSIS — I10 ESSENTIAL (PRIMARY) HYPERTENSION: ICD-10-CM

## 2021-04-13 DIAGNOSIS — H61.001 UNSPECIFIED PERICHONDRITIS OF RIGHT EXTERNAL EAR: ICD-10-CM

## 2021-04-13 LAB — POTASSIUM SERPL-SCNC: 4.1 MMOL/L (ref 3.5–5.2)

## 2021-04-13 PROCEDURE — 88305 TISSUE EXAM BY PATHOLOGIST: CPT | Performed by: OTOLARYNGOLOGY

## 2021-04-13 PROCEDURE — 84132 ASSAY OF SERUM POTASSIUM: CPT | Performed by: OTOLARYNGOLOGY

## 2021-04-14 LAB
CYTO UR: NORMAL
LAB AP CASE REPORT: NORMAL
LAB AP CLINICAL INFORMATION: NORMAL
PATH REPORT.FINAL DX SPEC: NORMAL
PATH REPORT.GROSS SPEC: NORMAL

## 2023-07-27 ENCOUNTER — TELEPHONE (OUTPATIENT)
Dept: GASTROENTEROLOGY | Facility: CLINIC | Age: 78
End: 2023-07-27

## 2023-07-27 NOTE — TELEPHONE ENCOUNTER
HUB TRANSFERRED A ALL PATIENT NEEDS TO SCHEDULE PROCEDURE. TRANSFERRED TO [facility]

## 2023-08-01 ENCOUNTER — TELEPHONE (OUTPATIENT)
Dept: GASTROENTEROLOGY | Facility: CLINIC | Age: 78
End: 2023-08-01

## 2023-08-01 NOTE — TELEPHONE ENCOUNTER
HUB TRANSFERRED CALL PATIENT STATES [facility] CALLED HER TO MOVE APPT. UP. TRANSFERRED TO [facility]

## 2023-08-02 ENCOUNTER — OUTSIDE FACILITY SERVICE (OUTPATIENT)
Dept: GASTROENTEROLOGY | Facility: CLINIC | Age: 78
End: 2023-08-02
Payer: MEDICARE

## 2023-08-02 ENCOUNTER — TELEPHONE (OUTPATIENT)
Dept: GASTROENTEROLOGY | Facility: CLINIC | Age: 78
End: 2023-08-02

## 2024-07-08 RX ORDER — FOLIC ACID 1 MG/1
1000 TABLET ORAL DAILY
Qty: 90 TABLET | Refills: 3 | OUTPATIENT
Start: 2024-07-08

## 2024-07-24 ENCOUNTER — TRANSCRIBE ORDERS (OUTPATIENT)
Dept: ADMINISTRATIVE | Facility: HOSPITAL | Age: 79
End: 2024-07-24
Payer: MEDICARE

## 2024-07-24 DIAGNOSIS — M48.062 SPINAL STENOSIS, LUMBAR REGION, WITH NEUROGENIC CLAUDICATION: Primary | ICD-10-CM

## 2024-09-20 ENCOUNTER — TELEPHONE (OUTPATIENT)
Age: 79
End: 2024-09-20
Payer: MEDICARE

## 2024-09-23 DIAGNOSIS — M05.79 RHEUMATOID ARTHRITIS WITH RHEUMATOID FACTOR OF MULTIPLE SITES WITHOUT ORGAN OR SYSTEMS INVOLVEMENT: Primary | ICD-10-CM

## 2024-09-23 RX ORDER — LORATADINE 10 MG/1
10 TABLET ORAL ONCE
Start: 2024-10-07

## 2024-09-23 RX ORDER — DIPHENHYDRAMINE HYDROCHLORIDE 50 MG/ML
50 INJECTION INTRAMUSCULAR; INTRAVENOUS AS NEEDED
Start: 2024-10-07

## 2024-09-23 RX ORDER — METHYLPREDNISOLONE SODIUM SUCCINATE 125 MG/2ML
125 INJECTION, POWDER, LYOPHILIZED, FOR SOLUTION INTRAMUSCULAR; INTRAVENOUS AS NEEDED
Start: 2024-10-07

## 2024-09-23 RX ORDER — FAMOTIDINE 20 MG/1
20 TABLET, FILM COATED ORAL AS NEEDED
Start: 2024-10-07

## 2024-09-23 RX ORDER — ACETAMINOPHEN 325 MG/1
650 TABLET ORAL ONCE
Start: 2024-10-07

## 2024-09-23 RX ORDER — SODIUM CHLORIDE 9 MG/ML
20 INJECTION, SOLUTION INTRAVENOUS ONCE
OUTPATIENT
Start: 2024-10-07

## 2024-10-01 ENCOUNTER — TELEPHONE (OUTPATIENT)
Age: 79
End: 2024-10-01
Payer: MEDICARE

## 2024-10-01 NOTE — TELEPHONE ENCOUNTER
Received fax from [facility] [employer] stating that patient currently gets Orencia IV and wanting to know if [facility] wanted to change one of the preferred alternatives. Faxed back stating that no, he does not wish to change medication. Not scanning into chart as this fax contained info for multiple patients.

## 2024-10-04 ENCOUNTER — TELEPHONE (OUTPATIENT)
Age: 79
End: 2024-10-04
Payer: MEDICARE

## 2024-10-04 NOTE — TELEPHONE ENCOUNTER
"    Hub staff attempted to follow warm transfer process and was unsuccessful     Caller: Stacia Adkins \"John\"    Relationship to patient: Self    Best call back number: [phone]     Patient is needing: PT CONFUSED ABOUT HER INJECTIONS, HASN'T GOTTEN ONE SINCE WE MOVED FEELS LIKE WE FORGOT HER. DOESN'T KNOW IF SHE NEEDS A FOLLOW UP OR INJECTION APT, WOULDN'T SCHEDULE WANTS TO BE CALLED BACK.     "

## 2024-10-04 NOTE — TELEPHONE ENCOUNTER
Radha spoke with patient and assured her that infusion was scheduled in [place] on 10/8. She is aware someone is going to call her to schedule follow up appointment as well.

## 2024-10-04 NOTE — TELEPHONE ENCOUNTER
CALLED PT TO SCHEDULE APPT, NO ANSWER, LEFT VM. IF PT CALLS BACK PLEASE SCHEDULE WITH DR. SHEETS OR AN VICKY COLON TO SCHEDULE

## 2024-10-08 ENCOUNTER — HOSPITAL ENCOUNTER (OUTPATIENT)
Facility: HOSPITAL | Age: 79
Discharge: HOME | End: 2024-10-08
Admitting: INTERNAL MEDICINE
Payer: MEDICARE

## 2024-10-08 VITALS
TEMPERATURE: 98.8 F | SYSTOLIC BLOOD PRESSURE: 146 MMHG | HEART RATE: 96 BPM | WEIGHT: 141 LBS | BODY MASS INDEX: 26.64 KG/M2 | RESPIRATION RATE: 18 BRPM | DIASTOLIC BLOOD PRESSURE: 62 MMHG

## 2024-10-08 DIAGNOSIS — M05.79 RHEUMATOID ARTHRITIS WITH RHEUMATOID FACTOR OF MULTIPLE SITES WITHOUT ORGAN OR SYSTEMS INVOLVEMENT: Primary | ICD-10-CM

## 2024-10-08 PROCEDURE — 96365 THER/PROPH/DIAG IV INF INIT: CPT

## 2024-10-08 PROCEDURE — 25010000002 ABATACEPT PER 10 MG: Performed by: INTERNAL MEDICINE

## 2024-10-08 PROCEDURE — 25810000003 SODIUM CHLORIDE 0.9 % SOLUTION: Performed by: INTERNAL MEDICINE

## 2024-10-08 RX ORDER — ACETAMINOPHEN 325 MG/1
650 TABLET ORAL ONCE
Status: DISCONTINUED | OUTPATIENT
Start: 2024-10-08 | End: 2024-10-09 | Stop reason: HOSPADM

## 2024-10-08 RX ORDER — SODIUM CHLORIDE 9 MG/ML
20 INJECTION, SOLUTION INTRAVENOUS ONCE
Status: COMPLETED | OUTPATIENT
Start: 2024-10-08 | End: 2024-10-08

## 2024-10-08 RX ORDER — SODIUM CHLORIDE 9 MG/ML
20 INJECTION, SOLUTION INTRAVENOUS ONCE
OUTPATIENT
Start: 2024-10-22

## 2024-10-08 RX ORDER — CETIRIZINE HYDROCHLORIDE 10 MG/1
10 TABLET ORAL ONCE
Status: DISCONTINUED | OUTPATIENT
Start: 2024-10-08 | End: 2024-10-09 | Stop reason: HOSPADM

## 2024-10-08 RX ORDER — FAMOTIDINE 20 MG/1
20 TABLET, FILM COATED ORAL AS NEEDED
Start: 2024-10-22

## 2024-10-08 RX ORDER — METHYLPREDNISOLONE SODIUM SUCCINATE 125 MG/2ML
125 INJECTION, POWDER, LYOPHILIZED, FOR SOLUTION INTRAMUSCULAR; INTRAVENOUS AS NEEDED
Start: 2024-10-22

## 2024-10-08 RX ORDER — DIPHENHYDRAMINE HYDROCHLORIDE 50 MG/ML
50 INJECTION INTRAMUSCULAR; INTRAVENOUS AS NEEDED
Start: 2024-10-22

## 2024-10-08 RX ORDER — LORATADINE 10 MG/1
10 TABLET ORAL ONCE
Start: 2024-10-22

## 2024-10-08 RX ORDER — ACETAMINOPHEN 325 MG/1
650 TABLET ORAL ONCE
Start: 2024-10-22

## 2024-10-08 RX ADMIN — SODIUM CHLORIDE 20 ML/HR: 9 INJECTION, SOLUTION INTRAVENOUS at 11:50

## 2024-10-08 RX ADMIN — SODIUM CHLORIDE 1000 MG: 9 INJECTION, SOLUTION INTRAVENOUS at 12:28

## 2024-10-10 ENCOUNTER — TELEPHONE (OUTPATIENT)
Age: 79
End: 2024-10-10

## 2024-10-10 ENCOUNTER — OFFICE VISIT (OUTPATIENT)
Age: 79
End: 2024-10-10
Payer: MEDICARE

## 2024-10-10 ENCOUNTER — LAB (OUTPATIENT)
Facility: HOSPITAL | Age: 79
End: 2024-10-10
Payer: MEDICARE

## 2024-10-10 VITALS
HEART RATE: 83 BPM | TEMPERATURE: 98 F | BODY MASS INDEX: 27.45 KG/M2 | WEIGHT: 139.8 LBS | DIASTOLIC BLOOD PRESSURE: 74 MMHG | SYSTOLIC BLOOD PRESSURE: 124 MMHG | HEIGHT: 60 IN

## 2024-10-10 DIAGNOSIS — D84.821 IMMUNOSUPPRESSION DUE TO DRUG THERAPY: ICD-10-CM

## 2024-10-10 DIAGNOSIS — M53.9 MULTILEVEL DEGENERATIVE DISC DISEASE: ICD-10-CM

## 2024-10-10 DIAGNOSIS — Z87.39 HISTORY OF RHABDOMYOLYSIS: ICD-10-CM

## 2024-10-10 DIAGNOSIS — R53.83 OTHER FATIGUE: ICD-10-CM

## 2024-10-10 DIAGNOSIS — Z79.899 HIGH RISK MEDICATION USE: ICD-10-CM

## 2024-10-10 DIAGNOSIS — M81.0 AGE RELATED OSTEOPOROSIS, UNSPECIFIED PATHOLOGICAL FRACTURE PRESENCE: ICD-10-CM

## 2024-10-10 DIAGNOSIS — Z79.899 IMMUNOSUPPRESSION DUE TO DRUG THERAPY: ICD-10-CM

## 2024-10-10 DIAGNOSIS — M15.9 GENERALIZED OSTEOARTHROSIS, INVOLVING MULTIPLE SITES: ICD-10-CM

## 2024-10-10 DIAGNOSIS — Z95.2 HISTORY OF AORTIC VALVE REPLACEMENT: ICD-10-CM

## 2024-10-10 DIAGNOSIS — G56.02 LEFT CARPAL TUNNEL SYNDROME: ICD-10-CM

## 2024-10-10 DIAGNOSIS — M79.7 FIBROMYALGIA: ICD-10-CM

## 2024-10-10 DIAGNOSIS — M05.79 RHEUMATOID ARTHRITIS INVOLVING MULTIPLE SITES WITH POSITIVE RHEUMATOID FACTOR: ICD-10-CM

## 2024-10-10 DIAGNOSIS — M05.79 RHEUMATOID ARTHRITIS INVOLVING MULTIPLE SITES WITH POSITIVE RHEUMATOID FACTOR: Primary | ICD-10-CM

## 2024-10-10 DIAGNOSIS — M19.011 PRIMARY OSTEOARTHRITIS OF RIGHT SHOULDER: ICD-10-CM

## 2024-10-10 LAB
ANISOCYTOSIS BLD QL: ABNORMAL
BASOPHILS # BLD MANUAL: 0 10*3/MM3 (ref 0–0.2)
BASOPHILS NFR BLD MANUAL: 0 % (ref 0–1.5)
DEPRECATED RDW RBC AUTO: 51.6 FL (ref 37–54)
EOSINOPHIL # BLD MANUAL: 0.08 10*3/MM3 (ref 0–0.4)
EOSINOPHIL NFR BLD MANUAL: 1 % (ref 0.3–6.2)
ERYTHROCYTE [DISTWIDTH] IN BLOOD BY AUTOMATED COUNT: 15.7 % (ref 12.3–15.4)
ERYTHROCYTE [SEDIMENTATION RATE] IN BLOOD: 31 MM/HR (ref 0–30)
HCT VFR BLD AUTO: 37.3 % (ref 34–46.6)
HGB BLD-MCNC: 12.4 G/DL (ref 12–15.9)
LYMPHOCYTES # BLD MANUAL: 1.91 10*3/MM3 (ref 0.7–3.1)
LYMPHOCYTES NFR BLD MANUAL: 2 % (ref 5–12)
MCH RBC QN AUTO: 31 PG (ref 26.6–33)
MCHC RBC AUTO-ENTMCNC: 33.2 G/DL (ref 31.5–35.7)
MCV RBC AUTO: 93.3 FL (ref 79–97)
MICROCYTES BLD QL: ABNORMAL
MONOCYTES # BLD: 0.16 10*3/MM3 (ref 0.1–0.9)
NEUTROPHILS # BLD AUTO: 5.63 10*3/MM3 (ref 1.7–7)
NEUTROPHILS NFR BLD MANUAL: 72.4 % (ref 42.7–76)
PLAT MORPH BLD: NORMAL
PLATELET # BLD AUTO: 183 10*3/MM3 (ref 140–450)
PMV BLD AUTO: 10.7 FL (ref 6–12)
RBC # BLD AUTO: 4 10*6/MM3 (ref 3.77–5.28)
VARIANT LYMPHS NFR BLD MANUAL: 1 % (ref 0–5)
VARIANT LYMPHS NFR BLD MANUAL: 23.5 % (ref 19.6–45.3)
WBC MORPH BLD: NORMAL
WBC NRBC COR # BLD AUTO: 7.78 10*3/MM3 (ref 3.4–10.8)

## 2024-10-10 PROCEDURE — 86200 CCP ANTIBODY: CPT

## 2024-10-10 PROCEDURE — 85007 BL SMEAR W/DIFF WBC COUNT: CPT

## 2024-10-10 PROCEDURE — 1160F RVW MEDS BY RX/DR IN RCRD: CPT | Performed by: INTERNAL MEDICINE

## 2024-10-10 PROCEDURE — G2211 COMPLEX E/M VISIT ADD ON: HCPCS | Performed by: INTERNAL MEDICINE

## 2024-10-10 PROCEDURE — 1159F MED LIST DOCD IN RCRD: CPT | Performed by: INTERNAL MEDICINE

## 2024-10-10 PROCEDURE — 80074 ACUTE HEPATITIS PANEL: CPT

## 2024-10-10 PROCEDURE — 80053 COMPREHEN METABOLIC PANEL: CPT

## 2024-10-10 PROCEDURE — 86431 RHEUMATOID FACTOR QUANT: CPT

## 2024-10-10 PROCEDURE — 86480 TB TEST CELL IMMUN MEASURE: CPT

## 2024-10-10 PROCEDURE — 86140 C-REACTIVE PROTEIN: CPT

## 2024-10-10 PROCEDURE — 85652 RBC SED RATE AUTOMATED: CPT

## 2024-10-10 PROCEDURE — 36415 COLL VENOUS BLD VENIPUNCTURE: CPT

## 2024-10-10 PROCEDURE — 85025 COMPLETE CBC W/AUTO DIFF WBC: CPT

## 2024-10-10 PROCEDURE — 99214 OFFICE O/P EST MOD 30 MIN: CPT | Performed by: INTERNAL MEDICINE

## 2024-10-10 RX ORDER — FOLIC ACID 1 MG/1
1 TABLET ORAL NIGHTLY
Qty: 90 TABLET | Refills: 1 | Status: SHIPPED | OUTPATIENT
Start: 2024-10-10

## 2024-10-10 RX ORDER — DULOXETIN HYDROCHLORIDE 30 MG/1
30 CAPSULE, DELAYED RELEASE ORAL DAILY
Qty: 90 CAPSULE | Refills: 1 | Status: SHIPPED | OUTPATIENT
Start: 2024-10-10

## 2024-10-10 RX ORDER — METHOTREXATE 2.5 MG/1
17.5 TABLET ORAL WEEKLY
Qty: 84 TABLET | Refills: 0 | Status: SHIPPED | OUTPATIENT
Start: 2024-10-10

## 2024-10-10 RX ORDER — FUROSEMIDE 80 MG
80 TABLET ORAL DAILY
COMMUNITY
Start: 2007-02-21

## 2024-10-10 RX ORDER — DULOXETIN HYDROCHLORIDE 60 MG/1
60 CAPSULE, DELAYED RELEASE ORAL DAILY
Qty: 90 CAPSULE | Refills: 1 | Status: SHIPPED | OUTPATIENT
Start: 2024-10-10

## 2024-10-10 NOTE — ASSESSMENT & PLAN NOTE
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 Fosamax was stopped 12/2022  **Prolia started 3/27/23 ACL.    Prolia was started 3/27/23. She tolerates Prolia well. No injection site issues.    No recent or upcoming dental work.    She had most recent dose of Prolia at [facility] around 4/24, but no longer wants to go there  Therefore we will request prior auth Prolia to be given through [facility] moving forward    Recommend weight-bearing exercise, calcium, and vitamin-D supplement.  Risks and benefits discussed in detail including but not limited to osteonecrosis jaw, atypical femoral fracture, bone death, kidney failure, hypocalcemia.  Patient has no reported jaw or tooth pain.  No femoral pain  Repeat DEXA after 12/29/2024

## 2024-10-10 NOTE — ASSESSMENT & PLAN NOTE
Episode December 13, 2018 after her head was trapped in a bed for 12 hours reaching for presents.  Hospitalized December 13 through the 15th..    History of.  Resolved.  CPK now normal.

## 2024-10-10 NOTE — ASSESSMENT & PLAN NOTE
Spine, knees  Avoiding NSAIDs with CKD  *Current:  Duloxetine  *Hydrocodone through Pain Management.    Continue duloxetine to help with her chronic low back pain from OA  s/p MILD procedure lumbar spine with Dr. Rodgers and plans on repeat of this  Avoiding NSAIDs with chronic kidney disease, cardiovascular disease, hypertension  Continue hydrocodone with pain management

## 2024-10-10 NOTE — ASSESSMENT & PLAN NOTE
Intolerant gabapentin.  Avoids NSAIDs with CKD  Neurosurgery Dr Cavazos / Dr. Rodgers pain management.    She is followed by pain management Dr. Rodgers.   Injections have been helpful with pain management    Avoids NSAIDs with chronic kidney disease  On hydrocodone per pain management  Continue duloxetine  Low back pain continues to be a major pain generator along with right shoulder pain from degenerative arthritis.    Suspect lumbar spinal stenosis and neurogenic claudication legs  Considering interventional spine therapies with Dr. Rodgers pain management

## 2024-10-10 NOTE — ASSESSMENT & PLAN NOTE
Patient is .  She worked as a  in the past  Four children  Diagnosed 2001.  Seropositive rheumatoid arthritis   **Current:  methotrexate PO and IV Orencia 1000mg every 4 weeks BH  Avoids NSAIDs with CKD  previously on Enbrel, Humira, sulfasalazine, Plaquenil, leflunomide      Low disease activity from RA standpoint on IV Orencia and MTX.  Synovitis on exam  sjc 0 with several tender joints- she seems to have hyperalgesia on exam related to fibromyalgia and OA.  Continue methotrexate 7 tablets once weekly along with daily folic acid   Continue IV Orencia 1000 mg every 4 weeks.   Medicine well tolerated and effective.    No signs of toxicity from Orencia or methotrexate.   -Labs ordered for monitoring as below  Plan will be to continue current medication, frequent intensive lab monitoring every 8-12 weeks (CBC, CMP) for toxicity monitoring.   Follow-up 3 months

## 2024-10-10 NOTE — ASSESSMENT & PLAN NOTE
**Current:  Duloxetine  Prior intolerance gabapentin.    - Continue duloxetine 30 mg in AM and 60 mg in PM for OA pain  - Risks and benefits of snris discussed including nausea, constipation, potential for suicidal ideation, and increasing depression   - I have encouraged regular low impact aerobic exercise up to 30 minutes per day. If this is unattainable, recommend a graded exercise program starting with 5 minutes of dedicated cardiovascular exercise daily, increasing by 1 minute daily until goal of 30 minutes daily is reached.  - she has done PT numerous times and declines to do this further  - I have recommended conservative measures to improve sleep  - recommend avoiding narcotics studies have shown that narcotics can worsen fibromyalgia pain.   - Counseled on alternative therapies that can help with pain including massage therapy, aquatic therapy, physical therapy, acupuncture, chiropractics, and psychology evaluation

## 2024-10-10 NOTE — ASSESSMENT & PLAN NOTE
Advanced OA right shoulder.    Considering steroid injection through Pain Management Dr. Rodgers.  She has decided against right shoulder replacement with [facility]

## 2024-10-10 NOTE — ASSESSMENT & PLAN NOTE
Orencia, methotrexate  Hepatitis and Q TB updated 10/10/2024    Well tolerated and effective   We discussed biologic agents at length. Risks and alternatives were discussed at length and the option of no treatment was also given. We discussed risks including but not limited to infections which can be unusual, severe, and deadly. When possible, these agents should be stopped immediately if infections occur. Unusual infection such as TB and fungal infections can occur. There may be an increased risk of lymphoma with these agents. Other risks can include a multiple sclerosis-like illness and worsening of heart failure. Infusion or injection reactions which can be deadly have been reported. Studies on pregnancy have not been done so pregnancy should be avoided while on these agents. Reactivation of a deadly brain virus and hepatitis viruses have been reported. Worsening of COPD has been seen with orencia. Elevated lipids, elevation in liver functions, and dangerous changes in blood counts have been seen with certain agents. Regular monitoring will be required    Risk include but are not limited to severe liver damage so can be fatal, the possible need for liver biopsy, bone marrow suppression that can lead to dangerously low blood counts, GI side effects including mouth sores and diarrhea, fatigue, and the rare risk of severe pulmonary complications.  There should be no alcohol consumed with methotrexate.  Methotrexate can cause severe fetal abnormalities with his mother father is taking the medication and thus must be avoided if pregnancy is a possibility.  All medication is to be taken 1 day a week only.  The need for Q 8-12-week labs and the need for folic acid supplement were discussed.

## 2024-10-10 NOTE — PROGRESS NOTES
Office Follow Up      Date: 10/10/2024   Patient Name: Stacia Adkins  MRN: [id number]  YOB: 1945    Referring Physician: Jose Hinds MD     Chief Complaint:   Chief Complaint   Patient presents with    Rheumatoid Arthritis       History of Present Illness: Stacia Adkins is a 79 y.o. female who is here today for follow up on rheumatoid arthritis     She maintains on methotrexate and IV Orencia. No infusion reactions.  Well-tolerated and she thinks the medications are helpful.    She has chronic osteoarthritis pain in her low back and shoulders.   She also has pain from osteoarthritis in the knees shoulders, hands and lumbar spine/cervical spine    She also has fibromyalgia. She continues on duloxetine     She avoids NSAIDs with chronic kidney disease, cardiovascular disease, and hypertension.  She has been intolerant to gabapentin historically  She is following with Dr Rodgers for chronic pain and is on hydrocodone PRN.  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 She had prior back surgery with Dr. Clark years ago.  She is s/p MILD procedure on her lumbar spine through Dr. Rodgers.  She has considered having a right shoulder replacement with [facility] but has decided against this.    She started Prolia for her osteoporosis 3/27/23. Her most recent dose was at [facility]. She tolerated this well. No recent or upcoming dental work. She previously took Fosamax from 2656-2754 but had worsening findings on her DEXA so she was switched to Prolia.    She had episode of rhabdo related to getting stuck in bed trying to get [employer] in early December 2018.  She was hospitalized 12/13/18-12/15/18 for weakness and rhabdo. CPK was normal at discharge.  No recurrence  She reports history of mild myocardial infarction and AVR. She follows with Dr. Perea  cardiology.      Subjective       Review of Systems: Review of Systems   Constitutional:  Positive for fatigue and unexpected weight loss. Negative for chills and fever.   HENT:  Negative for mouth sores, sinus pressure and sore throat.    Eyes:  Negative for pain and redness.   Respiratory:  Positive for shortness of breath. Negative for cough.    Cardiovascular:  Positive for chest pain.   Gastrointestinal:  Negative for abdominal pain, blood in stool, diarrhea, nausea, vomiting and GERD.   Endocrine: Positive for polydipsia. Negative for polyuria.   Genitourinary:  Negative for dysuria, genital sores and hematuria.   Musculoskeletal:  Positive for arthralgias, back pain, joint swelling, myalgias, neck pain and neck stiffness.   Skin:  Negative for rash and bruise.   Allergic/Immunologic: Negative for immunocompromised state.   Neurological:  Positive for weakness and numbness. Negative for seizures and memory problem.   Hematological:  Negative for adenopathy. Does not bruise/bleed easily.   Psychiatric/Behavioral:  Negative for depressed mood. The patient is not nervous/anxious.         Medications:   Current Outpatient Medications:     abatacept (Orencia) 250 MG injection, 30 mL Every 28 (Twenty-Eight) Days., Disp: , Rfl:     buPROPion XL (WELLBUTRIN XL) 150 MG 24 hr tablet, Take 1 tablet by mouth Daily., Disp: , Rfl:     citalopram (CeleXA) 20 MG tablet, , Disp: , Rfl:     denosumab (Prolia) 60 MG/ML solution prefilled syringe syringe, Inject 1 mL under the skin into the appropriate area as directed Every 6 (Six) Months., Disp: , Rfl:     DULoxetine (CYMBALTA) 30 MG capsule, Take 1 capsule by mouth Daily., Disp: 90 capsule, Rfl: 1    DULoxetine (CYMBALTA) 60 MG capsule, Take 1 capsule by mouth Daily., Disp: 90 capsule, Rfl: 1    famotidine (PEPCID) 20 MG tablet, , Disp: , Rfl:     folic acid (FOLVITE) 1 MG tablet, Take 1 tablet by mouth Every Night., Disp: 90 tablet, Rfl: 1    furosemide (LASIX) 80 MG tablet,  "Take 1 tablet by mouth Daily., Disp: , Rfl:     HYDROcodone-acetaminophen (NORCO)  MG per tablet, Take 1 tablet by mouth 2 (Two) Times a Day As Needed for Moderate Pain., Disp: , Rfl:     methotrexate 2.5 MG tablet, Take 7 tablets by mouth 1 (One) Time Per Week. On Sundays., Disp: 84 tablet, Rfl: 0    simvastatin (ZOCOR) 20 MG tablet, , Disp: , Rfl:     spironolactone (ALDACTONE) 25 MG tablet, Take 1 tablet by mouth Daily., Disp: , Rfl:     traZODone (DESYREL) 100 MG tablet, Take 1.5 tablets by mouth Every Night., Disp: , Rfl:     Allergies:   Allergies   Allergen Reactions    Gabapentin Other (See Comments)     \"Out of body experience\"    Hydroxychloroquine Sulfate Itching    Penicillins Rash    Vancomycin Hives       I have reviewed and updated the patient's chief complaint, history of present illness, review of systems, past medical history, surgical history, family history, social history, medications and allergy list as appropriate.     Objective        Vital Signs:   Vitals:    10/10/24 1040   BP: 124/74   BP Location: Left arm   Patient Position: Sitting   Cuff Size: Adult   Pulse: 83   Temp: 98 °F (36.7 °C)   Weight: 63.4 kg (139 lb 12.8 oz)   Height: 152.4 cm (60\")   PainSc:   8     Body mass index is 27.3 kg/m².      Physical Exam:  Physical Exam   MUSCULOSKELETAL:   No peripheral synovitis  Heberden and Francesca's nodes present throughout the hands  Bilateral CMCs tender  Crepitus and painful minimal range of motion right shoulder  Crepitus knees.  Tender lumbar spine and cervical spine to palpation.   Widespread tender points present above and below the waist  Significant kyphosis spine.  Ambulates hunched over    Complete joint exam was performed including the MCPs, PIPs, DIPs of the hands, wrists, elbows, shoulders, hips, knees and ankles.  No soft tissue swelling or tenderness is present except as above.    General: The patient is well-developed and well nourished. Cooperative, alert and " "oriented. Affect is normal. Hydration appears normal.   HEENT: Normocephalic and atraumatic. No notable alopecia. Lids and conjunctiva are normal. Pupils are equal and sclera are clear. Oropharynx is clear   NECK neck is supple without adenopathy, masses or thyromegaly.   CARDIOVASCULAR: Regular rate and rhythm. No murmurs, rubs or gallops   LUNGS: Effort is normal. Lungs are clear bilateral   ABDOMEN: Not examined  EXTREMITIES: Peripheral pulses are intact. No clubbing.   SKIN: No rashes. No subcutaneous nodules. No digital ulcers. No sclerodactyly.   NEUROLOGIC: Gait is normal. Strength testing is normal.  No focal neurologic deficits    Results Review:   Labs:   Lab Results   Component Value Date    GLUCOSE 96 12/15/2018    BUN 20 12/15/2018    CREATININE 0.83 12/15/2018    EGFR 69 04/05/2016    BCR 24.1 12/15/2018    K 4.1 04/13/2021    CO2 22.0 12/15/2018    CALCIUM 9.1 12/15/2018    PROTENTOTREF 6.0 04/04/2016    ALBUMIN 4.58 12/13/2018    BILITOT 0.7 12/13/2018    AST 65 (H) 12/13/2018    ALT 47 (H) 12/13/2018     Lab Results   Component Value Date    WBC 7.40 12/14/2018    HGB 10.2 (L) 12/14/2018    HCT 32.0 (L) 12/14/2018    MCV 98.5 12/14/2018     12/14/2018     Lab Results   Component Value Date    SEDRATE 16 04/05/2016     Lab Results   Component Value Date    CRP 0.500 03/17/2014     No results found for: \"QUANTIFERO\", \"QUANTITB1\", \"QUANTITB2\", \"QUANTIFERN\", \"QUANTIFERM\", \"QUANTITBGLDP\"  No results found for: \"RF\"  No results found for: \"HEPBSAG\", \"HEPAIGM\", \"HEPBIGMCORE\", \"HEPCVIRUSABY\"      Procedures    Assessment / Plan        Assessment & Plan  Rheumatoid arthritis involving multiple sites with positive rheumatoid factor  Patient is .  She worked as a  in the past  Four children  Diagnosed 2001.  Seropositive rheumatoid arthritis   **Current:  methotrexate PO and IV Orencia 1000mg every 4 weeks [facility]  Avoids NSAIDs with CKD  previously on Enbrel, Humira, " sulfasalazine, Plaquenil, leflunomide      Low disease activity from RA standpoint on IV Orencia and MTX.  Synovitis on exam  sjc 0 with several tender joints- she seems to have hyperalgesia on exam related to fibromyalgia and OA.  Continue methotrexate 7 tablets once weekly along with daily folic acid   Continue IV Orencia 1000 mg every 4 weeks.   Medicine well tolerated and effective.    No signs of toxicity from Orencia or methotrexate.   -Labs ordered for monitoring as below  Plan will be to continue current medication, frequent intensive lab monitoring every 8-12 weeks (CBC, CMP) for toxicity monitoring.   Follow-up 3 months    High risk medication use  Orencia, methotrexate  Hepatitis and Q TB updated 10/10/2024    Well tolerated and effective   We discussed biologic agents at length. Risks and alternatives were discussed at length and the option of no treatment was also given. We discussed risks including but not limited to infections which can be unusual, severe, and deadly. When possible, these agents should be stopped immediately if infections occur. Unusual infection such as TB and fungal infections can occur. There may be an increased risk of lymphoma with these agents. Other risks can include a multiple sclerosis-like illness and worsening of heart failure. Infusion or injection reactions which can be deadly have been reported. Studies on pregnancy have not been done so pregnancy should be avoided while on these agents. Reactivation of a deadly brain virus and hepatitis viruses have been reported. Worsening of COPD has been seen with orencia. Elevated lipids, elevation in liver functions, and dangerous changes in blood counts have been seen with certain agents. Regular monitoring will be required    Risk include but are not limited to severe liver damage so can be fatal, the possible need for liver biopsy, bone marrow suppression that can lead to dangerously low blood counts, GI side effects including  mouth sores and diarrhea, fatigue, and the rare risk of severe pulmonary complications.  There should be no alcohol consumed with methotrexate.  Methotrexate can cause severe fetal abnormalities with his mother father is taking the medication and thus must be avoided if pregnancy is a possibility.  All medication is to be taken 1 day a week only.  The need for Q 8-12-week labs and the need for folic acid supplement were discussed.  Immunosuppression due to drug therapy    Age related osteoporosis, unspecified pathological fracture presence  DEXA 8/13/2019 T-score -3.2 wrist  DEXA 12/29/22 with worsening osteoporosis wrist with T-score -3.3 despite Fosamax since 8/2019. Fosamax was stopped 12/2022  **Prolia started 3/27/23 ACL.    Prolia was started 3/27/23. She tolerates Prolia well. No injection site issues.    No recent or upcoming dental work.    She had most recent dose of Prolia at [facility] around 4/24, but no longer wants to go there  Therefore we will request prior auth Prolia to be given through [facility] moving forward    Recommend weight-bearing exercise, calcium, and vitamin-D supplement.  Risks and benefits discussed in detail including but not limited to osteonecrosis jaw, atypical femoral fracture, bone death, kidney failure, hypocalcemia.  Patient has no reported jaw or tooth pain.  No femoral pain  Repeat DEXA after 12/29/2024  Fibromyalgia  **Current:  Duloxetine  Prior intolerance gabapentin.    - Continue duloxetine 30 mg in AM and 60 mg in PM for OA pain  - Risks and benefits of snris discussed including nausea, constipation, potential for suicidal ideation, and increasing depression   - I have encouraged regular low impact aerobic exercise up to 30 minutes per day. If this is unattainable, recommend a graded exercise program starting with 5 minutes of dedicated cardiovascular exercise daily, increasing by 1 minute daily until goal of 30 minutes daily is reached.  - she has done PT numerous  times and declines to do this further  - I have recommended conservative measures to improve sleep  - recommend avoiding narcotics studies have shown that narcotics can worsen fibromyalgia pain.   - Counseled on alternative therapies that can help with pain including massage therapy, aquatic therapy, physical therapy, acupuncture, chiropractics, and psychology evaluation  Generalized osteoarthrosis, involving multiple sites  Spine, knees  Avoiding NSAIDs with CKD  *Current:  Duloxetine  *Hydrocodone through Pain Management.    Continue duloxetine to help with her chronic low back pain from OA  s/p MILD procedure lumbar spine with Dr. Rodgers and plans on repeat of this  Avoiding NSAIDs with chronic kidney disease, cardiovascular disease, hypertension  Continue hydrocodone with pain management    Primary osteoarthritis of right shoulder  Advanced OA right shoulder.    Considering steroid injection through Pain Management Dr. Rodgers.  She has decided against right shoulder replacement with [facility]    Multilevel degenerative disc disease  Intolerant gabapentin.  Avoids NSAIDs with CKD  Neurosurgery Dr Cavazos / Dr. Rodgers pain management.    She is followed by pain management Dr. Rodgers.   Injections have been helpful with pain management    Avoids NSAIDs with chronic kidney disease  On hydrocodone per pain management  Continue duloxetine  Low back pain continues to be a major pain generator along with right shoulder pain from degenerative arthritis.    Suspect lumbar spinal stenosis and neurogenic claudication legs  Considering interventional spine therapies with Dr. Rodgers pain management  Left carpal tunnel syndrome  Stable  Recommend carpal tunnel wrist splint and follow up with Dr Martin if persistent  History of rhabdomyolysis  Episode December 13, 2018 after her head was trapped in a bed for 12 hours reaching for presents.  Hospitalized December 13 through the  15th..    History of.  Resolved.  CPK now normal.  History of aortic valve replacement  Cardiology dr grigsby.      Other fatigue      Orders Placed This Encounter   Procedures    CBC Auto Differential    Comprehensive Metabolic Panel    Sedimentation Rate    C-reactive Protein    Cyclic Citrul Peptide Antibody, IgG / IgA    Rheumatoid Factor    QuantiFERON-TB Gold Plus    Hepatitis Panel, Acute     New Medications Ordered This Visit   Medications    DULoxetine (CYMBALTA) 30 MG capsule     Sig: Take 1 capsule by mouth Daily.     Dispense:  90 capsule     Refill:  1    DULoxetine (CYMBALTA) 60 MG capsule     Sig: Take 1 capsule by mouth Daily.     Dispense:  90 capsule     Refill:  1    methotrexate 2.5 MG tablet     Sig: Take 7 tablets by mouth 1 (One) Time Per Week. On Sundays.     Dispense:  84 tablet     Refill:  0    folic acid (FOLVITE) 1 MG tablet     Sig: Take 1 tablet by mouth Every Night.     Dispense:  90 tablet     Refill:  1           Follow Up:   Return in about 3 months (around 1/10/2025) for Followup APRN.        Kirk Deutsch MD  [facility]

## 2024-10-11 LAB
ALBUMIN SERPL-MCNC: 4.4 G/DL (ref 3.5–5.2)
ALBUMIN/GLOB SERPL: 1.5 G/DL
ALP SERPL-CCNC: 89 U/L (ref 39–117)
ALT SERPL W P-5'-P-CCNC: 13 U/L (ref 1–33)
ANION GAP SERPL CALCULATED.3IONS-SCNC: 10.5 MMOL/L (ref 5–15)
AST SERPL-CCNC: 23 U/L (ref 1–32)
BILIRUB SERPL-MCNC: 0.3 MG/DL (ref 0–1.2)
BUN SERPL-MCNC: 28 MG/DL (ref 8–23)
BUN/CREAT SERPL: 20.4 (ref 7–25)
CALCIUM SPEC-SCNC: 9.9 MG/DL (ref 8.6–10.5)
CHLORIDE SERPL-SCNC: 102 MMOL/L (ref 98–107)
CHROMATIN AB SERPL-ACNC: <10 IU/ML (ref 0–14)
CO2 SERPL-SCNC: 24.5 MMOL/L (ref 22–29)
CREAT SERPL-MCNC: 1.37 MG/DL (ref 0.57–1)
CRP SERPL-MCNC: <0.3 MG/DL (ref 0–0.5)
EGFRCR SERPLBLD CKD-EPI 2021: 39.4 ML/MIN/1.73
GLOBULIN UR ELPH-MCNC: 3 GM/DL
GLUCOSE SERPL-MCNC: 122 MG/DL (ref 65–99)
HAV IGM SERPL QL IA: NORMAL
HBV CORE IGM SERPL QL IA: NORMAL
HBV SURFACE AG SERPL QL IA: NORMAL
HCV AB SER QL: NORMAL
POTASSIUM SERPL-SCNC: 4.4 MMOL/L (ref 3.5–5.2)
PROT SERPL-MCNC: 7.4 G/DL (ref 6–8.5)
SODIUM SERPL-SCNC: 137 MMOL/L (ref 136–145)

## 2024-10-12 LAB — CCP IGA+IGG SERPL IA-ACNC: 8 UNITS (ref 0–19)

## 2024-10-14 NOTE — TELEPHONE ENCOUNTER
Prolia Copay $100 please confirm with the patient that this copay is okay and let us know. We will get them set up with assessment .

## 2024-10-14 NOTE — TELEPHONE ENCOUNTER
Called pt and left my direct line to call me back. We can set her up in house for $100, we can try [facility], or possibly [facility] if she wants an estimate up front.

## 2024-10-15 ENCOUNTER — TRANSCRIBE ORDERS (OUTPATIENT)
Dept: ADMINISTRATIVE | Facility: HOSPITAL | Age: 79
End: 2024-10-15
Payer: MEDICARE

## 2024-10-15 DIAGNOSIS — M48.062 SPINAL STENOSIS, LUMBAR REGION, WITH NEUROGENIC CLAUDICATION: Primary | ICD-10-CM

## 2024-10-15 LAB
GAMMA INTERFERON BACKGROUND BLD IA-ACNC: 0.11 IU/ML
M TB IFN-G BLD-IMP: NEGATIVE
M TB IFN-G CD4+ BCKGRND COR BLD-ACNC: 0.1 IU/ML
M TB IFN-G CD4+CD8+ BCKGRND COR BLD-ACNC: 0.1 IU/ML
MITOGEN IGNF BCKGRD COR BLD-ACNC: >10 IU/ML
QUANTIFERON INCUBATION: NORMAL
SERVICE CMNT-IMP: NORMAL

## 2024-10-22 ENCOUNTER — HOSPITAL ENCOUNTER (OUTPATIENT)
Facility: HOSPITAL | Age: 79
Discharge: HOME | End: 2024-10-22
Payer: MEDICARE

## 2024-11-04 ENCOUNTER — TELEPHONE (OUTPATIENT)
Age: 79
End: 2024-11-04
Payer: MEDICARE

## 2024-11-04 NOTE — TELEPHONE ENCOUNTER
"We did receive this fax request and I attempted to return call to \"Todd.\" When I called number provided I was prompted to \"press 1 for listing agent\" and then it rang several times before I had to leave message. Left message advising that we would not be returning this fax as patient did not request these orthotics. Told them to call back with any questions.  "

## 2024-11-04 NOTE — TELEPHONE ENCOUNTER
Caller: [facility]     Relationship to patient: Other    Best call back number: [phone]    Patient is needing: SENT PRIOR AUTH REQUEST ON FRIDAY FOR DME AND REFAXED TODAY ON DME. CHECKING TO SEE IF THAT HAS BEEN RECEIVED. IF RECEIVED PLEASE FILL OUT AND FAX BACK [phone]. IF IT HAS NOT BEEN RECEIVED, PLEASE CALL BACK TO LET THEM KNOW YOU HAVE NOT RECEIVED IT.

## 2024-11-06 ENCOUNTER — TELEPHONE (OUTPATIENT)
Age: 79
End: 2024-11-06
Payer: MEDICARE

## 2024-11-06 NOTE — TELEPHONE ENCOUNTER
Caller: [facility]    Best call back number: [phone]     Patient is needing: CALLING IN REGARDS TO PA REQUEST FOR ORTHOTICS. PLEASE SEE PREVIOUS ENCOUNTER FROM 11/4/24. I RELAYED THAT MESSAGE THAT NANETTE HAD PUT IN TO [facility] AND HE STATED THAT PATIENT HAD REQUESTED ORTHOTICS FROM THEIR PHARMACY. HE ALSO STATES THAT REGARDLESS OF WHETHER PA IS APPROVED OR DENIED, THAT THIS PAPERWORK NEEDS TO BE FAXED BACK TO THEM STATING THE DECISION TO THE PREVIOUSLY PROVIDED FAX IN PREVIOUS ENCOUNTER. HE WILL ALSO BE REACHING OUT TO PATIENT.

## 2024-11-06 NOTE — TELEPHONE ENCOUNTER
I will not be faxing this back to them without hearing from the patient personally.    HUB OK TO RELAY

## 2024-11-07 ENCOUNTER — HOSPITAL ENCOUNTER (OUTPATIENT)
Facility: HOSPITAL | Age: 79
Discharge: HOME | End: 2024-11-07
Admitting: PHYSICIAN ASSISTANT
Payer: MEDICARE

## 2024-11-07 DIAGNOSIS — M48.062 SPINAL STENOSIS, LUMBAR REGION, WITH NEUROGENIC CLAUDICATION: ICD-10-CM

## 2024-11-07 PROCEDURE — 72148 MRI LUMBAR SPINE W/O DYE: CPT

## 2024-11-08 ENCOUNTER — TELEPHONE (OUTPATIENT)
Age: 79
End: 2024-11-08
Payer: MEDICARE

## 2024-11-08 NOTE — TELEPHONE ENCOUNTER
We will not be faxing back these forms. Patient did not request and this is a scam. I have called and left a message and also faxed back stating we will not be signing anything for this. See telephone notes 11/4/24 and 11/6/24.

## 2024-11-08 NOTE — TELEPHONE ENCOUNTER
The [facility] received a fax that requires your attention. The document has been indexed to the patient’s chart for your review.      Reason for sending: PRESCRIPTION FORM    Documents Description: MEDICATION 11/05/24    Name of Sender: [facility]    Date Indexed: 11/08/24    Notes (if needed): URGENT

## 2024-12-18 ENCOUNTER — HOSPITAL ENCOUNTER (EMERGENCY)
Facility: HOSPITAL | Age: 79
Discharge: HOME | End: 2024-12-18
Attending: STUDENT IN AN ORGANIZED HEALTH CARE EDUCATION/TRAINING PROGRAM
Payer: MEDICARE

## 2024-12-18 VITALS
WEIGHT: 139 LBS | OXYGEN SATURATION: 99 % | TEMPERATURE: 98.2 F | HEIGHT: 60 IN | BODY MASS INDEX: 27.29 KG/M2 | HEART RATE: 86 BPM | RESPIRATION RATE: 20 BRPM | SYSTOLIC BLOOD PRESSURE: 112 MMHG | DIASTOLIC BLOOD PRESSURE: 65 MMHG

## 2024-12-18 DIAGNOSIS — M54.9 RIGHT-SIDED BACK PAIN, UNSPECIFIED BACK LOCATION, UNSPECIFIED CHRONICITY: Primary | ICD-10-CM

## 2024-12-18 PROCEDURE — 99283 EMERGENCY DEPT VISIT LOW MDM: CPT

## 2024-12-18 PROCEDURE — 25010000002 DEXAMETHASONE PER 1 MG: Performed by: STUDENT IN AN ORGANIZED HEALTH CARE EDUCATION/TRAINING PROGRAM

## 2024-12-18 PROCEDURE — 25010000002 KETOROLAC TROMETHAMINE PER 15 MG: Performed by: STUDENT IN AN ORGANIZED HEALTH CARE EDUCATION/TRAINING PROGRAM

## 2024-12-18 PROCEDURE — 96375 TX/PRO/DX INJ NEW DRUG ADDON: CPT

## 2024-12-18 PROCEDURE — 25010000002 HYDROMORPHONE PER 4 MG: Performed by: STUDENT IN AN ORGANIZED HEALTH CARE EDUCATION/TRAINING PROGRAM

## 2024-12-18 PROCEDURE — 96374 THER/PROPH/DIAG INJ IV PUSH: CPT

## 2024-12-18 PROCEDURE — 25010000002 DIAZEPAM PER 5 MG: Performed by: STUDENT IN AN ORGANIZED HEALTH CARE EDUCATION/TRAINING PROGRAM

## 2024-12-18 RX ORDER — CYCLOBENZAPRINE HCL 10 MG
10 TABLET ORAL ONCE
Status: COMPLETED | OUTPATIENT
Start: 2024-12-18 | End: 2024-12-18

## 2024-12-18 RX ORDER — HYDROMORPHONE HYDROCHLORIDE 1 MG/ML
0.25 INJECTION, SOLUTION INTRAMUSCULAR; INTRAVENOUS; SUBCUTANEOUS ONCE
Status: DISCONTINUED | OUTPATIENT
Start: 2024-12-18 | End: 2024-12-18

## 2024-12-18 RX ORDER — DEXAMETHASONE SODIUM PHOSPHATE 10 MG/ML
10 INJECTION INTRAMUSCULAR; INTRAVENOUS ONCE
Status: COMPLETED | OUTPATIENT
Start: 2024-12-18 | End: 2024-12-18

## 2024-12-18 RX ORDER — DIAZEPAM 10 MG/2ML
2.5 INJECTION, SOLUTION INTRAMUSCULAR; INTRAVENOUS ONCE
Status: COMPLETED | OUTPATIENT
Start: 2024-12-18 | End: 2024-12-18

## 2024-12-18 RX ORDER — KETOROLAC TROMETHAMINE 15 MG/ML
15 INJECTION, SOLUTION INTRAMUSCULAR; INTRAVENOUS ONCE
Status: COMPLETED | OUTPATIENT
Start: 2024-12-18 | End: 2024-12-18

## 2024-12-18 RX ORDER — CYCLOBENZAPRINE HCL 5 MG
5 TABLET ORAL 3 TIMES DAILY PRN
Qty: 6 TABLET | Refills: 0 | Status: SHIPPED | OUTPATIENT
Start: 2024-12-18 | End: 2024-12-20

## 2024-12-18 RX ORDER — HYDROMORPHONE HYDROCHLORIDE 1 MG/ML
0.25 INJECTION, SOLUTION INTRAMUSCULAR; INTRAVENOUS; SUBCUTANEOUS ONCE
Status: COMPLETED | OUTPATIENT
Start: 2024-12-18 | End: 2024-12-18

## 2024-12-18 RX ADMIN — DIAZEPAM 2.5 MG: 10 INJECTION, SOLUTION INTRAMUSCULAR; INTRAVENOUS at 09:57

## 2024-12-18 RX ADMIN — HYDROMORPHONE HYDROCHLORIDE 0.25 MG: 1 INJECTION, SOLUTION INTRAMUSCULAR; INTRAVENOUS; SUBCUTANEOUS at 09:56

## 2024-12-18 RX ADMIN — CYCLOBENZAPRINE HYDROCHLORIDE 10 MG: 10 TABLET, FILM COATED ORAL at 09:56

## 2024-12-18 RX ADMIN — DEXAMETHASONE SODIUM PHOSPHATE 10 MG: 10 INJECTION INTRAMUSCULAR; INTRAVENOUS at 09:56

## 2024-12-18 RX ADMIN — KETOROLAC TROMETHAMINE 15 MG: 15 INJECTION, SOLUTION INTRAMUSCULAR; INTRAVENOUS at 09:55

## 2024-12-18 NOTE — FSED PROVIDER NOTE
"Subjective  History of Present Illness:      79 year old female hx of RA (on infusion therapy) who presents with low back pain with radiation to the right leg. Pain has been daily for the last six weeks. She states she came in today bc today she was not able to bear any weight on her leg due to pain. She denies numbness/tingling, weakness, urinary/bowel retention/incontinence. She follows with pain mgmt     Nurses Notes reviewed and agree, including vitals, allergies, social history and prior medical history.     REVIEW OF SYSTEMS: All systems reviewed and not pertinent unless noted.  Review of Systems   All other systems reviewed and are negative.      Past Medical History:   Diagnosis Date    Anemia     Arthritis, rheumatoid     Back pain     Cervical spondylosis     CHF (congestive heart failure)     Degenerative arthritis of lumbar spine     Dupuytrens contracture     High risk medication use     History of aortic valve replacement     Renal insufficiency     Seropositive rheumatoid arthritis     Vision loss of left eye     Vitamin D deficiency        Allergies:    Gabapentin, Hydroxychloroquine sulfate, Penicillins, and Vancomycin      Past Surgical History:   Procedure Laterality Date    BACK SURGERY      CARDIAC VALVE SURGERY      OTHER SURGICAL HISTORY      ear surgery    SHOULDER SURGERY           Social History     Socioeconomic History    Marital status:    Tobacco Use    Smoking status: Never    Smokeless tobacco: Never   Vaping Use    Vaping status: Never Used   Substance and Sexual Activity    Alcohol use: No    Drug use: No    Sexual activity: Defer         Family History   Problem Relation Age of Onset    Arthritis Father        Objective  Physical Exam:  /45   Pulse 79   Temp 98.2 °F (36.8 °C) (Oral)   Resp 20   Ht 152.4 cm (60\")   Wt 63 kg (139 lb)   SpO2 100%   BMI 27.15 kg/m²      Physical Exam  Constitutional:       Appearance: Normal appearance.   HENT:      Head: " Normocephalic and atraumatic.      Nose: Nose normal.      Mouth/Throat:      Mouth: Mucous membranes are moist.   Eyes:      Extraocular Movements: Extraocular movements intact.      Conjunctiva/sclera: Conjunctivae normal.   Cardiovascular:      Rate and Rhythm: Normal rate and regular rhythm.   Pulmonary:      Effort: Pulmonary effort is normal. No respiratory distress.   Abdominal:      General: There is no distension.      Comments: Atraumatic    Musculoskeletal:         General: Normal range of motion.      Cervical back: Normal range of motion and neck supple.   Skin:     General: Skin is warm and dry.   Neurological:      General: No focal deficit present.      Mental Status: She is alert.      Comments: Sensation equal and intact to BLE, no saddle anesthesia, decreased strength to BLE (3/5), positive straight leg to RLE   Psychiatric:         Mood and Affect: Mood normal.         Behavior: Behavior normal.         Procedures    ED Course:         Lab Results (last 24 hours)       ** No results found for the last 24 hours. **             No radiology results from the last 24 hrs       MDM      DDX: includes but is not limited to: lumbar strain, sciatica, spinal cord compression    Pertinent features from physical exam: no neuro deficits appreciated, positive straight leg. PVR 10    Initial diagnostic plan: no indication for imaging given lack of trauma. Back nontender to palpation    Results from initial plan were reviewed and interpreted by me revealing PVR 10-Given benign evaluation/lack of neuro deficits and improvement after analgesia, I have low suspicion for acute spinal cord compression. I do think urgent mri/nsyg fu is warranted however. Patient feels comfortable with discharge plan of care    Diagnostic information from other sources: chart review    Interventions / Re-evaluation: dilaudid, toradol, valium, decadron    Medications   cyclobenzaprine (FLEXERIL) tablet 10 mg (10 mg Oral Given 12/18/24  0956)   ketorolac (TORADOL) injection 15 mg (15 mg Intravenous Given 12/18/24 0955)   dexAMETHasone (DECADRON) injection 10 mg (10 mg Intravenous Given 12/18/24 0956)   diazePAM (VALIUM) injection 2.5 mg (2.5 mg Intravenous Given 12/18/24 0957)   HYDROmorphone (DILAUDID) injection 0.25 mg (0.25 mg Intravenous Given 12/18/24 0956)       Results/clinical rationale were discussed with patient/family member at bedside    Consultations/Discussion of results with other physicians: none    Data interpreted: Nursing notes reviewed, vital signs reviewed.  Labs independently interpreted by me .  Imaging independently interpreted by me.  EKG independently interpreted by me.      Counseling: Discussed the results above with the patient regarding need for admission or discharge.  Patient understands and agrees plan of care.      -----  ED Disposition       ED Disposition   Discharge    Condition   Stable    Comment   --             Final diagnoses:   Right-sided back pain, unspecified back location, unspecified chronicity      Your Follow-Up Providers       [facility] EMERGENCY DEPARTMENT [place].    Specialty: Emergency Medicine  Follow up details: As needed, If symptoms worsen  [address]  [facility] [address] [phone]             PATIENT CONNECTION - [place]. Schedule an appointment as soon as possible for a visit in 1 day.    [place] [id number] [phone]                     Contact information for after-discharge care    Follow-up information has not been specified.                    Your medication list        START taking these medications        Instructions Last Dose Given Next Dose Due   cyclobenzaprine 5 MG tablet  Commonly known as: FLEXERIL      Take 1 tablet by mouth 3 (Three) Times a Day As Needed for Muscle Spasms for up to 2 days.              CONTINUE taking these medications        Instructions Last Dose Given Next Dose Due   buPROPion  MG 24  hr tablet  Commonly known as: WELLBUTRIN XL      Take 1 tablet by mouth Daily.       citalopram 20 MG tablet  Commonly known as: CeleXA           DULoxetine 30 MG capsule  Commonly known as: CYMBALTA      Take 1 capsule by mouth Daily.       DULoxetine 60 MG capsule  Commonly known as: CYMBALTA      Take 1 capsule by mouth Daily.       famotidine 20 MG tablet  Commonly known as: PEPCID           folic acid 1 MG tablet  Commonly known as: FOLVITE      Take 1 tablet by mouth Every Night.       furosemide 80 MG tablet  Commonly known as: LASIX      Take 1 tablet by mouth Daily.       HYDROcodone-acetaminophen  MG per tablet  Commonly known as: NORCO      Take 1 tablet by mouth 2 (Two) Times a Day As Needed for Moderate Pain.       methotrexate 2.5 MG tablet      Take 7 tablets by mouth 1 (One) Time Per Week. On Sundays.       Orencia 250 MG injection  Generic drug: abatacept      30 mL Every 28 (Twenty-Eight) Days.       Prolia 60 MG/ML solution prefilled syringe syringe  Generic drug: denosumab      Inject 1 mL under the skin into the appropriate area as directed Every 6 (Six) Months.       simvastatin 20 MG tablet  Commonly known as: ZOCOR           spironolactone 25 MG tablet  Commonly known as: ALDACTONE      Take 1 tablet by mouth Daily.       traZODone 100 MG tablet  Commonly known as: DESYREL      Take 1.5 tablets by mouth Every Night.                 Where to Get Your Medications        These medications were sent to [facility] - [place], [place] - [address] - [phone]  - [phone] FX  [address], [facility] [address]      Phone: [phone]   cyclobenzaprine 5 MG tablet

## 2025-01-07 ENCOUNTER — TELEPHONE (OUTPATIENT)
Age: 80
End: 2025-01-07
Payer: MEDICARE

## 2025-01-10 NOTE — TELEPHONE ENCOUNTER
Called patient yesterday and she had to answer another call so she stated she would call me back. Tried reaching out to her this morning. There was no answer so I left a message requesting a call back to my direct line.

## 2025-01-16 ENCOUNTER — TELEPHONE (OUTPATIENT)
Age: 80
End: 2025-01-16

## 2025-01-21 ENCOUNTER — PREP FOR SURGERY (OUTPATIENT)
Dept: OTHER | Facility: HOSPITAL | Age: 80
End: 2025-01-21
Payer: MEDICARE

## 2025-01-21 ENCOUNTER — OFFICE VISIT (OUTPATIENT)
Dept: NEUROSURGERY | Facility: CLINIC | Age: 80
End: 2025-01-21
Payer: MEDICARE

## 2025-01-21 VITALS — WEIGHT: 136 LBS | HEIGHT: 60 IN | BODY MASS INDEX: 26.7 KG/M2 | RESPIRATION RATE: 17 BRPM

## 2025-01-21 DIAGNOSIS — M48.062 LUMBAR STENOSIS WITH NEUROGENIC CLAUDICATION: Primary | ICD-10-CM

## 2025-01-21 DIAGNOSIS — M51.9 LUMBAR DISC DISEASE: ICD-10-CM

## 2025-01-21 DIAGNOSIS — M48.062 SPINAL STENOSIS OF LUMBAR REGION WITH NEUROGENIC CLAUDICATION: Primary | ICD-10-CM

## 2025-01-21 PROCEDURE — 99204 OFFICE O/P NEW MOD 45 MIN: CPT | Performed by: NEUROLOGICAL SURGERY

## 2025-01-21 PROCEDURE — 1159F MED LIST DOCD IN RCRD: CPT | Performed by: NEUROLOGICAL SURGERY

## 2025-01-21 PROCEDURE — 1160F RVW MEDS BY RX/DR IN RCRD: CPT | Performed by: NEUROLOGICAL SURGERY

## 2025-01-21 RX ORDER — ACETAMINOPHEN 500 MG
1000 TABLET ORAL ONCE
OUTPATIENT
Start: 2025-01-21 | End: 2025-01-21

## 2025-01-21 RX ORDER — FAMOTIDINE 20 MG/1
20 TABLET, FILM COATED ORAL
OUTPATIENT
Start: 2025-01-21

## 2025-01-21 RX ORDER — CHLORHEXIDINE GLUCONATE 40 MG/ML
SOLUTION TOPICAL
Qty: 120 ML | Refills: 0 | Status: SHIPPED | OUTPATIENT
Start: 2025-01-21 | End: 2025-01-27 | Stop reason: SDUPTHER

## 2025-01-21 RX ORDER — OXYCODONE HCL 10 MG/1
10 TABLET, FILM COATED, EXTENDED RELEASE ORAL ONCE
OUTPATIENT
Start: 2025-01-21 | End: 2025-01-21

## 2025-01-21 RX ORDER — IBUPROFEN 800 MG/1
800 TABLET, FILM COATED ORAL ONCE
OUTPATIENT
Start: 2025-01-21 | End: 2025-01-21

## 2025-01-21 NOTE — H&P
Patient: Stacia Adkins  : 1945     Primary Care Provider: Provider, No Known     Requesting Provider: As above           History     Chief Complaint: Low back and right leg pain with walking and standing intolerance.     History of Present Illness: Ms. Adkins is a 79-year-old woman who I saw a decade ago.  She has a 30-year history of progressive low back pain.  Over the last 2 months it has been quite profound.  Pain extends from her back into the buttock and into the side of the right thigh crossing over into the more medial calf and into the bottom of her right foot.  Symptoms occur with standing and walking and improve when she sits down or lies down.  She has had some pain management intervention which was not helpful.  She is intolerant of physical therapy.  She is not anticoagulated but does have a cardiologist.  She takes methotrexate.  She denies bowel or bladder dysfunction.     Review of Systems   Constitutional:  Negative for activity change, appetite change, chills, diaphoresis, fatigue, fever and unexpected weight change.   HENT:  Negative for congestion, dental problem, drooling, ear discharge, ear pain, facial swelling, hearing loss, mouth sores, nosebleeds, postnasal drip, rhinorrhea, sinus pressure, sneezing, sore throat, tinnitus, trouble swallowing and voice change.    Eyes:  Negative for photophobia, pain, discharge, redness, itching and visual disturbance.   Respiratory:  Negative for apnea, cough, choking, chest tightness, shortness of breath, wheezing and stridor.    Cardiovascular:  Negative for chest pain, palpitations and leg swelling.   Gastrointestinal:  Negative for abdominal distention, abdominal pain, anal bleeding, blood in stool, constipation, diarrhea, nausea, rectal pain and vomiting.   Endocrine: Negative for cold intolerance, heat intolerance, polydipsia, polyphagia and polyuria.   Genitourinary:  Negative for decreased urine volume, difficulty urinating, dysuria,  "enuresis, flank pain, frequency, genital sores, hematuria and urgency.   Musculoskeletal:  Positive for arthralgias and back pain. Negative for gait problem, joint swelling, myalgias, neck pain and neck stiffness.   Skin:  Negative for color change, pallor, rash and wound.   Allergic/Immunologic: Negative for environmental allergies, food allergies and immunocompromised state.   Neurological:  Negative for dizziness, tremors, seizures, syncope, facial asymmetry, speech difficulty, weakness, light-headedness, numbness and headaches.   Hematological:  Negative for adenopathy. Does not bruise/bleed easily.   Psychiatric/Behavioral:  Negative for agitation, behavioral problems, confusion, decreased concentration, dysphoric mood, hallucinations, self-injury, sleep disturbance and suicidal ideas. The patient is not nervous/anxious and is not hyperactive.    All other systems reviewed and are negative.        The patient's past medical history, past surgical history, family history, and social history have been reviewed at length in the electronic medical record.     Past Medical History:   Diagnosis Date    Anemia     Arthritis, rheumatoid     Back pain     Cervical spondylosis     CHF (congestive heart failure)     Degenerative arthritis of lumbar spine     Dupuytrens contracture     High risk medication use     History of aortic valve replacement     Renal insufficiency     Seropositive rheumatoid arthritis     Vision loss of left eye     Vitamin D deficiency      Past Surgical History:   Procedure Laterality Date    CARDIAC VALVE SURGERY      EPIDURAL STIMULATOR INSERTION  12/2011    Thoracic epidural stimulator- Dr. Clark    EPIDURAL STIMULATOR INSERTION  06/2012    \"Lead flipped\"- Unit was replaced, complicated by MRSA infection and removed thereafter.    OTHER SURGICAL HISTORY  03/06/2014    Epidural stimulator wound I&D- Dr. Castro    SHOULDER SURGERY       Family History   Problem Relation Age of Onset    " "Arthritis Father      Social History     Socioeconomic History    Marital status:    Tobacco Use    Smoking status: Never     Passive exposure: Never    Smokeless tobacco: Never   Vaping Use    Vaping status: Never Used   Substance and Sexual Activity    Alcohol use: No    Drug use: No    Sexual activity: Defer           Allergies   Allergen Reactions    Gabapentin Other (See Comments)     \"Out of body experience\"    Hydroxychloroquine Sulfate Itching    Penicillins Rash    Vancomycin Hives       Current Outpatient Medications on File Prior to Visit   Medication Sig Dispense Refill    abatacept (Orencia) 250 MG injection 30 mL Every 28 (Twenty-Eight) Days.      buPROPion XL (WELLBUTRIN XL) 150 MG 24 hr tablet Take 1 tablet by mouth Daily.      citalopram (CeleXA) 20 MG tablet       denosumab (Prolia) 60 MG/ML solution prefilled syringe syringe Inject 1 mL under the skin into the appropriate area as directed Every 6 (Six) Months.      DULoxetine (CYMBALTA) 30 MG capsule Take 1 capsule by mouth Daily. 90 capsule 1    DULoxetine (CYMBALTA) 60 MG capsule Take 1 capsule by mouth Daily. 90 capsule 1    famotidine (PEPCID) 20 MG tablet       folic acid (FOLVITE) 1 MG tablet Take 1 tablet by mouth Every Night. 90 tablet 1    furosemide (LASIX) 80 MG tablet Take 1 tablet by mouth Daily.      HYDROcodone-acetaminophen (NORCO)  MG per tablet Take 1 tablet by mouth 2 (Two) Times a Day As Needed for Moderate Pain.      methotrexate 2.5 MG tablet Take 7 tablets by mouth 1 (One) Time Per Week. On Sundays. 84 tablet 0    simvastatin (ZOCOR) 20 MG tablet       spironolactone (ALDACTONE) 25 MG tablet Take 1 tablet by mouth Daily.      traZODone (DESYREL) 100 MG tablet Take 1.5 tablets by mouth Every Night.       No current facility-administered medications on file prior to visit.          Physical Exam:   Resp 17   Ht 152.4 cm (60\")   Wt 61.7 kg (136 lb)   BMI 26.56 kg/m²   CONSTITUTIONAL: Patient is well-nourished, " pleasant and appears stated age.  MUSCULOSKELETAL:  Straight leg raising is negative.  Branden's Sign is negative.  ROM in the low back is limited in all directions.  Tenderness in the back to palpation is not observed.  NEUROLOGICAL:  Orientation, memory, attention span, language function, and cognition have been examined and are intact.  Strength is intact in the lower extremities to direct testing.  Muscle tone is normal throughout.  Station and gait are somewhat looped.  Sensation is intact to light touch testing throughout.  Deep tendon reflexes are difficult to elicit throughout.  Coordination is intact.        Medical Decision Making     Data Review:   (All imaging studies were personally reviewed unless stated otherwise)  MRI of the lumbar spine dated 11/7/2024 demonstrate diffuse degenerative disc disease and facet arthropathy.  There could be a trace offset of L4 and L5.  There is mild recess narrowing on the right at L1-2.  There is severe central stenosis with nerve root redundancy at L2-3, L3-4, and L4-5.     Diagnosis:   1.  Lumbar stenosis with neurogenic claudication and radiculopathy.  2.  Chronic mechanical low back pain.     Treatment Options:   The patient is in agony.  I have recommended decompressive laminectomies from L2-5.  The goal would be to improve her radicular symptoms as well as her walking and standing intolerance.  This is not a cure all for her longstanding mechanical low back pain.  The nature of the procedure as well as the potential risks, complications, limitations, and alternatives to the procedure were discussed at length with the patient and the patient has agreed to proceed with surgery.  She will need to come off of her methotrexate 1-2 weeks before her surgery.  Formal cardiac clearance will be necessary.

## 2025-01-21 NOTE — Clinical Note
Lumbar laminectomy Film=BHLEX  Will require cardiac clearance  On Methotrexate, may need to be off prior to surgery

## 2025-01-21 NOTE — PROGRESS NOTES
Patient: Stacia Adkins  : 1945    Primary Care Provider: Provider, No Known    Requesting Provider: As above        History    Chief Complaint: Low back and right leg pain with walking and standing intolerance.    History of Present Illness: Ms. Adkins is a 79-year-old woman who I saw a decade ago.  She has a 30-year history of progressive low back pain.  Over the last 2 months it has been quite profound.  Pain extends from her back into the buttock and into the side of the right thigh crossing over into the more medial calf and into the bottom of her right foot.  Symptoms occur with standing and walking and improve when she sits down or lies down.  She has had some pain management intervention which was not helpful.  She is intolerant of physical therapy.  She is not anticoagulated but does have a cardiologist.  She takes methotrexate.  She denies bowel or bladder dysfunction.    Review of Systems   Constitutional:  Negative for activity change, appetite change, chills, diaphoresis, fatigue, fever and unexpected weight change.   HENT:  Negative for congestion, dental problem, drooling, ear discharge, ear pain, facial swelling, hearing loss, mouth sores, nosebleeds, postnasal drip, rhinorrhea, sinus pressure, sneezing, sore throat, tinnitus, trouble swallowing and voice change.    Eyes:  Negative for photophobia, pain, discharge, redness, itching and visual disturbance.   Respiratory:  Negative for apnea, cough, choking, chest tightness, shortness of breath, wheezing and stridor.    Cardiovascular:  Negative for chest pain, palpitations and leg swelling.   Gastrointestinal:  Negative for abdominal distention, abdominal pain, anal bleeding, blood in stool, constipation, diarrhea, nausea, rectal pain and vomiting.   Endocrine: Negative for cold intolerance, heat intolerance, polydipsia, polyphagia and polyuria.   Genitourinary:  Negative for decreased urine volume, difficulty urinating, dysuria, enuresis,  "flank pain, frequency, genital sores, hematuria and urgency.   Musculoskeletal:  Positive for arthralgias and back pain. Negative for gait problem, joint swelling, myalgias, neck pain and neck stiffness.   Skin:  Negative for color change, pallor, rash and wound.   Allergic/Immunologic: Negative for environmental allergies, food allergies and immunocompromised state.   Neurological:  Negative for dizziness, tremors, seizures, syncope, facial asymmetry, speech difficulty, weakness, light-headedness, numbness and headaches.   Hematological:  Negative for adenopathy. Does not bruise/bleed easily.   Psychiatric/Behavioral:  Negative for agitation, behavioral problems, confusion, decreased concentration, dysphoric mood, hallucinations, self-injury, sleep disturbance and suicidal ideas. The patient is not nervous/anxious and is not hyperactive.    All other systems reviewed and are negative.      The patient's past medical history, past surgical history, family history, and social history have been reviewed at length in the electronic medical record.      Physical Exam:   Resp 17   Ht 152.4 cm (60\")   Wt 61.7 kg (136 lb)   BMI 26.56 kg/m²   CONSTITUTIONAL: Patient is well-nourished, pleasant and appears stated age.  MUSCULOSKELETAL:  Straight leg raising is negative.  Branden's Sign is negative.  ROM in the low back is limited in all directions.  Tenderness in the back to palpation is not observed.  NEUROLOGICAL:  Orientation, memory, attention span, language function, and cognition have been examined and are intact.  Strength is intact in the lower extremities to direct testing.  Muscle tone is normal throughout.  Station and gait are somewhat looped.  Sensation is intact to light touch testing throughout.  Deep tendon reflexes are difficult to elicit throughout.  Coordination is intact.      Medical Decision Making    Data Review:   (All imaging studies were personally reviewed unless stated otherwise)  MRI of the " lumbar spine dated 11/7/2024 demonstrate diffuse degenerative disc disease and facet arthropathy.  There could be a trace offset of L4 and L5.  There is mild recess narrowing on the right at L1-2.  There is severe central stenosis with nerve root redundancy at L2-3, L3-4, and L4-5.    Diagnosis:   1.  Lumbar stenosis with neurogenic claudication and radiculopathy.  2.  Chronic mechanical low back pain.    Treatment Options:   The patient is in agony.  I have recommended decompressive laminectomies from L2-5.  The goal would be to improve her radicular symptoms as well as her walking and standing intolerance.  This is not a cure all for her longstanding mechanical low back pain.  The nature of the procedure as well as the potential risks, complications, limitations, and alternatives to the procedure were discussed at length with the patient and the patient has agreed to proceed with surgery.  She will need to come off of her methotrexate 1-2 weeks before her surgery.  Formal cardiac clearance will be necessary.    Scribed for Eliezer Cavazos MD by Katie Jasmine CMA on 1/21/2025 15:59 EST         I, Dr. Cavazos, personally performed the services described in the documentation, as scribed in my presence, and it is both accurate and complete.

## 2025-01-24 ENCOUNTER — PATIENT ROUNDING (BHMG ONLY) (OUTPATIENT)
Dept: NEUROSURGERY | Facility: CLINIC | Age: 80
End: 2025-01-24
Payer: MEDICARE

## 2025-01-27 ENCOUNTER — TELEPHONE (OUTPATIENT)
Age: 80
End: 2025-01-27

## 2025-01-27 ENCOUNTER — LAB (OUTPATIENT)
Facility: HOSPITAL | Age: 80
End: 2025-01-27
Payer: MEDICARE

## 2025-01-27 ENCOUNTER — OFFICE VISIT (OUTPATIENT)
Age: 80
End: 2025-01-27
Payer: MEDICARE

## 2025-01-27 VITALS
TEMPERATURE: 97.8 F | DIASTOLIC BLOOD PRESSURE: 80 MMHG | BODY MASS INDEX: 26.68 KG/M2 | WEIGHT: 135.9 LBS | HEART RATE: 87 BPM | SYSTOLIC BLOOD PRESSURE: 118 MMHG | HEIGHT: 60 IN

## 2025-01-27 DIAGNOSIS — Z79.899 HIGH RISK MEDICATION USE: ICD-10-CM

## 2025-01-27 DIAGNOSIS — M53.9 MULTILEVEL DEGENERATIVE DISC DISEASE: Primary | ICD-10-CM

## 2025-01-27 DIAGNOSIS — M06.9 RHEUMATOID ARTHRITIS INVOLVING MULTIPLE SITES, UNSPECIFIED WHETHER RHEUMATOID FACTOR PRESENT: ICD-10-CM

## 2025-01-27 DIAGNOSIS — M06.9 RHEUMATOID ARTHRITIS INVOLVING MULTIPLE SITES, UNSPECIFIED WHETHER RHEUMATOID FACTOR PRESENT: Primary | ICD-10-CM

## 2025-01-27 LAB
BASOPHILS # BLD AUTO: 0.09 10*3/MM3 (ref 0–0.2)
BASOPHILS NFR BLD AUTO: 0.6 % (ref 0–1.5)
DEPRECATED RDW RBC AUTO: 47.3 FL (ref 37–54)
EOSINOPHIL # BLD AUTO: 0.38 10*3/MM3 (ref 0–0.4)
EOSINOPHIL NFR BLD AUTO: 2.6 % (ref 0.3–6.2)
ERYTHROCYTE [DISTWIDTH] IN BLOOD BY AUTOMATED COUNT: 15.2 % (ref 12.3–15.4)
ERYTHROCYTE [SEDIMENTATION RATE] IN BLOOD: 30 MM/HR (ref 0–30)
HCT VFR BLD AUTO: 39.7 % (ref 34–46.6)
HGB BLD-MCNC: 13.4 G/DL (ref 12–15.9)
IMM GRANULOCYTES # BLD AUTO: 0.14 10*3/MM3 (ref 0–0.05)
IMM GRANULOCYTES NFR BLD AUTO: 0.9 % (ref 0–0.5)
LYMPHOCYTES # BLD AUTO: 3.45 10*3/MM3 (ref 0.7–3.1)
LYMPHOCYTES NFR BLD AUTO: 23.4 % (ref 19.6–45.3)
MCH RBC QN AUTO: 31 PG (ref 26.6–33)
MCHC RBC AUTO-ENTMCNC: 33.8 G/DL (ref 31.5–35.7)
MCV RBC AUTO: 91.9 FL (ref 79–97)
MONOCYTES # BLD AUTO: 1.12 10*3/MM3 (ref 0.1–0.9)
MONOCYTES NFR BLD AUTO: 7.6 % (ref 5–12)
NEUTROPHILS NFR BLD AUTO: 64.9 % (ref 42.7–76)
NEUTROPHILS NFR BLD AUTO: 9.59 10*3/MM3 (ref 1.7–7)
NRBC BLD AUTO-RTO: 0 /100 WBC (ref 0–0.2)
PLATELET # BLD AUTO: 169 10*3/MM3 (ref 140–450)
PMV BLD AUTO: 10.8 FL (ref 6–12)
RBC # BLD AUTO: 4.32 10*6/MM3 (ref 3.77–5.28)
WBC NRBC COR # BLD AUTO: 14.77 10*3/MM3 (ref 3.4–10.8)

## 2025-01-27 PROCEDURE — 80053 COMPREHEN METABOLIC PANEL: CPT

## 2025-01-27 PROCEDURE — 85025 COMPLETE CBC W/AUTO DIFF WBC: CPT

## 2025-01-27 PROCEDURE — 86140 C-REACTIVE PROTEIN: CPT

## 2025-01-27 PROCEDURE — 36415 COLL VENOUS BLD VENIPUNCTURE: CPT

## 2025-01-27 PROCEDURE — 85652 RBC SED RATE AUTOMATED: CPT

## 2025-01-27 RX ORDER — CHLORHEXIDINE GLUCONATE 40 MG/ML
SOLUTION TOPICAL
Qty: 120 ML | Refills: 0 | Status: ON HOLD | OUTPATIENT
Start: 2025-01-27 | End: 2025-02-03

## 2025-01-27 NOTE — TELEPHONE ENCOUNTER
-Schedule pt to resume Orencia infusions for RA  [place] as previous 1 month after upcoming lumbar surgery scheduled early Feb 2025 with Dr Briceño.    -Remind her to hold MTX 2 weeks prior and after lumbar surgery

## 2025-01-27 NOTE — TELEPHONE ENCOUNTER
I was going over patient's medications today at her rheumatology follow up and she was not aware that Dr. Cavazos had sent in chlorhexadine solution and mupirocin ointment to [facility]. She changed insurances at the first of the year and no longer uses them. She is requesting these be sent to her [facility] on file instead so she will have them to use prior to surgery.

## 2025-01-27 NOTE — PROGRESS NOTES
Office Follow Up      Date: 01/27/2025   Patient Name: Stacia Adkins  MRN: [id number]  YOB: 1945    Referring Physician: No ref. provider found     Chief Complaint:   Chief Complaint   Patient presents with    Rheumatoid Arthritis       History of Present Illness: Stacia Adkins is a 79 y.o. female who is here today for follow up on rheumatoid arthritis     History:  We have prescribed methotrexate and IV Orencia for her.  She finds these effective.    She has chronic osteoarthritis pain in her low back and shoulders.   She has pain from generalized osteoarthritis in the knees shoulders, hands and lumbar spine/cervical spine  She also has fibromyalgia. She continues on duloxetine     She avoids NSAIDs with chronic kidney disease, cardiovascular disease, and hypertension.  She has been intolerant to gabapentin historically  She is following with Dr Rodgers for chronic pain and is on hydrocodone PRN.  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   She is s/p MILD procedure on her lumbar spine through Dr. Rodgers.  She has considered having a right shoulder replacement with [facility] but has decided against this.    She started Prolia for her osteoporosis 3/27/23. She tolerates this well. No recent or upcoming dental work. She previously took Fosamax from 4918-3034 but had worsening findings on her DEXA so she was switched to Prolia.    She had episode of rhabdo related to getting stuck in bed trying to get [employer] in early December 2018.  She was hospitalized 12/13/18-12/15/18 for weakness and rhabdo. CPK was normal at discharge.  No recurrence  History of of myocardial infarction and AVR. She follows with cardiology.    Interim 1/27/2025: Continues to struggle with severe back pain that radiates down the leg.  Planning on lumbar decompressive surgery  2/3/2025 with Dr. Cavazos.  Currently holding methotrexate 2 weeks before and 2 weeks after surgery.  Has not had to do Orencia infusion since 11/5/2024 and presently on hold in light of upcoming lumbar surgery      Subjective       Review of Systems:     Medications:   Current Outpatient Medications:     abatacept (Orencia) 250 MG injection, 30 mL Every 28 (Twenty-Eight) Days., Disp: , Rfl:     buPROPion XL (WELLBUTRIN XL) 150 MG 24 hr tablet, Take 1 tablet by mouth Daily., Disp: , Rfl:     citalopram (CeleXA) 20 MG tablet, , Disp: , Rfl:     denosumab (Prolia) 60 MG/ML solution prefilled syringe syringe, Inject 1 mL under the skin into the appropriate area as directed Every 6 (Six) Months., Disp: , Rfl:     DULoxetine (CYMBALTA) 30 MG capsule, Take 1 capsule by mouth Daily., Disp: 90 capsule, Rfl: 1    DULoxetine (CYMBALTA) 60 MG capsule, Take 1 capsule by mouth Daily., Disp: 90 capsule, Rfl: 1    famotidine (PEPCID) 20 MG tablet, , Disp: , Rfl:     folic acid (FOLVITE) 1 MG tablet, Take 1 tablet by mouth Every Night., Disp: 90 tablet, Rfl: 1    furosemide (LASIX) 80 MG tablet, Take 1 tablet by mouth Daily., Disp: , Rfl:     HYDROcodone-acetaminophen (NORCO)  MG per tablet, Take 1 tablet by mouth 2 (Two) Times a Day As Needed for Moderate Pain., Disp: , Rfl:     methotrexate 2.5 MG tablet, Take 7 tablets by mouth 1 (One) Time Per Week. On Sundays., Disp: 84 tablet, Rfl: 0    simvastatin (ZOCOR) 20 MG tablet, , Disp: , Rfl:     spironolactone (ALDACTONE) 25 MG tablet, Take 1 tablet by mouth Daily., Disp: , Rfl:     traZODone (DESYREL) 100 MG tablet, Take 1.5 tablets by mouth Every Night., Disp: , Rfl:     Chlorhexidine Gluconate 4 % solution, Shower each day with solution for 5 days beginning 5 days before surgery., Disp: 120 mL, Rfl: 0    mupirocin (BACTROBAN) 2 % nasal ointment, Apply to the inside of each nostril with a cotton swab two times daily, morning and evening, for 5 days before surgery., Disp: 10  "each, Rfl: 0    Allergies:   Allergies   Allergen Reactions    Gabapentin Other (See Comments)     \"Out of body experience\"    Hydroxychloroquine Sulfate Itching    Penicillins Rash    Vancomycin Hives       I have reviewed and updated the patient's chief complaint, history of present illness, review of systems, past medical history, surgical history, family history, social history, medications and allergy list, physical exam, assessment and plan as appropriate.      Objective        Vital Signs:   Vitals:    01/27/25 1521   BP: 118/80   BP Location: Left arm   Patient Position: Sitting   Cuff Size: Adult   Pulse: 87   Temp: 97.8 °F (36.6 °C)   Weight: 61.6 kg (135 lb 14.4 oz)   Height: 152.4 cm (60\")   PainSc:   9       Body mass index is 26.54 kg/m².      Physical Exam:    MUSCULOSKELETAL:   No peripheral synovitis  Heberden and Francesca's nodes present throughout the hands  Bilateral CMCs tender  Crepitus and painful minimal range of motion right shoulder  Crepitus knees.  Tender lumbar spine and cervical spine to palpation.   Widespread tender points present above and below the waist  Significant kyphosis spine.  Ambulates hunched over    Complete joint exam was performed including the MCPs, PIPs, DIPs of the hands, wrists, elbows, shoulders, hips, knees and ankles.  No soft tissue swelling or tenderness is present except as above.    General: The patient is well-developed and well nourished. Cooperative, alert and oriented. Affect is normal. Hydration appears normal.   HEENT: Normocephalic and atraumatic. No notable alopecia. Lids and conjunctiva are normal. Pupils are equal and sclera are clear. Oropharynx is clear   NECK neck is supple without adenopathy, masses or thyromegaly.   CARDIOVASCULAR: Regular rate and rhythm. No murmurs, rubs or gallops   LUNGS: Effort is normal. Lungs are clear bilateral   ABDOMEN: Not examined  EXTREMITIES: Peripheral pulses are intact. No clubbing.   SKIN: No rashes. No " "subcutaneous nodules. No digital ulcers. No sclerodactyly.   NEUROLOGIC: Gait is normal. Strength testing is normal.  No focal neurologic deficits    Results Review:   Labs:   Lab Results   Component Value Date    GLUCOSE 122 (H) 10/10/2024    BUN 28 (H) 10/10/2024    CREATININE 1.37 (H) 10/10/2024    EGFR 39.4 (L) 10/10/2024    BCR 20.4 10/10/2024    K 4.4 10/10/2024    CO2 24.5 10/10/2024    CALCIUM 9.9 10/10/2024    PROTENTOTREF 6.0 04/04/2016    ALBUMIN 4.4 10/10/2024    BILITOT 0.3 10/10/2024    AST 23 10/10/2024    ALT 13 10/10/2024     Lab Results   Component Value Date    WBC 7.78 10/10/2024    HGB 12.4 10/10/2024    HCT 37.3 10/10/2024    MCV 93.3 10/10/2024     10/10/2024     Lab Results   Component Value Date    SEDRATE 31 (H) 10/10/2024     Lab Results   Component Value Date    CRP <0.30 10/10/2024     Lab Results   Component Value Date    QUANTIFERO Incubation performed. 10/10/2024    QUANTIFERO Comment 10/10/2024    QUANTITB1 0.10 10/10/2024    QUANTITB2 0.10 10/10/2024    QUANTIFERN 0.11 10/10/2024    QUANTIFERM >10.00 10/10/2024    QUANTITBGLDP Negative 10/10/2024     No results found for: \"RF\"  Lab Results   Component Value Date    HEPBSAG Non-Reactive 10/10/2024    HEPAIGM Non-Reactive 10/10/2024    HEPBIGMCORE Non-Reactive 10/10/2024    HEPCVIRUSABY Non-Reactive 10/10/2024           Assessment / Plan      Rheumatoid arthritis involving multiple sites with positive rheumatoid factor  Patient is .  She worked as a  in the past  Four children  Diagnosed RA 2001.    Labs 10/10/2024: Negative rheumatoid factor, negative CCP antibody  **Current:  methotrexate PO and IV Orencia 1000mg every 4 weeks [facility] [place]  Avoids NSAIDs with CKD  previously on Enbrel, Humira, sulfasalazine, Plaquenil, leflunomide        Low disease activity from RA standpoint on IV Orencia and MTX.    sjc 0 with several tender joints- she seems to have hyperalgesia on exam related to " fibromyalgia and OA.    -She has upcoming lumbar decompressive surgery 2/3/2025 with Dr. Cavazos  -Hold methotrexate 2 weeks before and 2 weeks after surgery  -Continue to hold Orencia until 4 weeks after surgery    - resume methotrexate 7 tablets once weekly 2 weeks after surgery along with daily folic acid  - resume V Orencia 1000 mg every 4 weeks , 4 weeks after surgery.   Medicine well tolerated and effective.    No signs of toxicity from Orencia or methotrexate.   -Labs ordered for monitoring as below  Plan will be to continue current medication, frequent intensive lab monitoring every 12 weeks (CBC, CMP) for toxicity monitoring.   Follow-up 4 months     High risk medication use  Orencia, methotrexate  Hepatitis and Q TB negative 10/10/2024     Well tolerated and effective   We discussed biologic agents at length. Risks and alternatives were discussed at length and the option of no treatment was also given. We discussed risks including but not limited to infections which can be unusual, severe, and deadly. When possible, these agents should be stopped immediately if infections occur. Unusual infection such as TB and fungal infections can occur. There may be an increased risk of lymphoma with these agents. Other risks can include a multiple sclerosis-like illness and worsening of heart failure. Infusion or injection reactions which can be deadly have been reported. Studies on pregnancy have not been done so pregnancy should be avoided while on these agents. Reactivation of a deadly brain virus and hepatitis viruses have been reported. Worsening of COPD has been seen with orencia. Elevated lipids, elevation in liver functions, and dangerous changes in blood counts have been seen with certain agents. Regular monitoring will be required     Risk include but are not limited to severe liver damage so can be fatal, the possible need for liver biopsy, bone marrow suppression that can lead to dangerously low blood  counts, GI side effects including mouth sores and diarrhea, fatigue, and the rare risk of severe pulmonary complications.  There should be no alcohol consumed with methotrexate.  Methotrexate can cause severe fetal abnormalities with his mother father is taking the medication and thus must be avoided if pregnancy is a possibility.  All medication is to be taken 1 day a week only.  The need for Q 8-12-week labs and the need for folic acid supplement were discussed.    Immunosuppression due to drug therapy     Osteoporosis  DEXA 8/13/2019 T-score -3.2 wrist  DEXA 12/29/22 with worsening osteoporosis wrist with T-score -3.3   Prior Fosamax since 8/2019-12/2022  **Prolia started 3/27/23 ACL.     Prolia was started 3/27/23. She tolerates Prolia well. No injection site issues.    No recent or upcoming dental work.     Prior Prolia at [facility] around 4/24, but no longer wants to go there  Therefore we will request prior auth Prolia to be given through [facility] moving forward     Recommend weight-bearing exercise, calcium, and vitamin-D supplement.  Risks and benefits discussed in detail including but not limited to osteonecrosis jaw, atypical femoral fracture, bone death, kidney failure, hypocalcemia.  Patient has no reported jaw or tooth pain.  No femoral pain  Update DEXA in 2025    Fibromyalgia  **Current:  Duloxetine  Prior intolerance gabapentin.     - Continue duloxetine 30 mg in AM and 60 mg in PM for OA pain  - Risks and benefits of snris discussed including nausea, constipation, potential for suicidal ideation, and increasing depression   - I have encouraged regular low impact aerobic exercise up to 30 minutes per day. If this is unattainable, recommend a graded exercise program starting with 5 minutes of dedicated cardiovascular exercise daily, increasing by 1 minute daily until goal of 30 minutes daily is reached.  - she has done PT numerous times and declines to do this further  - I have recommended  conservative measures to improve sleep  - recommend avoiding narcotics studies have shown that narcotics can worsen fibromyalgia pain.   - Counseled on alternative therapies that can help with pain including massage therapy, aquatic therapy, physical therapy, acupuncture, chiropractics, and psychology evaluation    Generalized osteoarthrosis, involving multiple sites  Spine, knees  Avoiding NSAIDs with CKD  *Current:  Duloxetine  *Hydrocodone through Pain Management.     Continue duloxetine to help with her chronic low back pain from OA  s/p MILD procedure lumbar spine with Dr. Rodgers   Avoiding NSAIDs with chronic kidney disease, cardiovascular disease, hypertension  Continue hydrocodone with pain management     Primary osteoarthritis of right shoulder  Advanced OA right shoulder.    Considering steroid injection through Pain Management Dr. Rodgers.  She has decided against right shoulder replacement with [facility]     Multilevel degenerative disc disease  Intolerant gabapentin.  Avoids NSAIDs with CKD  Neurosurgery Dr Cavazos / Dr. Rodgers pain management.     Avoids NSAIDs with chronic kidney disease  On hydrocodone per pain management  Continue duloxetine  Low back pain continues to be a major pain generator along with right shoulder pain from degenerative arthritis.    Suspect lumbar spinal stenosis and neurogenic claudication legs  Planning on lumbar decompressive surgery with Dr. Cavazos 2/3/2025    Left carpal tunnel syndrome  Stable  Recommend carpal tunnel wrist splint and follow up with Dr Martin if persistent    History of rhabdomyolysis  Episode December 13, 2018 after her head was trapped in a bed for 12 hours reaching for presents.  Hospitalized December 13 through the 15th..     History of.  Resolved.  CPK now normal.    History of aortic valve replacement  Cardiology Dr. Jacobs    Assessment & Plan  Rheumatoid arthritis involving multiple sites, unspecified whether  rheumatoid factor present    High risk medication use          Follow Up:   Return in about 4 months (around 5/27/2025).        Kirk Deutsch MD  [facility]

## 2025-01-28 LAB
ALBUMIN SERPL-MCNC: 4 G/DL (ref 3.5–5.2)
ALBUMIN/GLOB SERPL: 1.1 G/DL
ALP SERPL-CCNC: 87 U/L (ref 39–117)
ALT SERPL W P-5'-P-CCNC: 10 U/L (ref 1–33)
ANION GAP SERPL CALCULATED.3IONS-SCNC: 11.5 MMOL/L (ref 5–15)
AST SERPL-CCNC: 25 U/L (ref 1–32)
BILIRUB SERPL-MCNC: 0.3 MG/DL (ref 0–1.2)
BUN SERPL-MCNC: 28 MG/DL (ref 8–23)
BUN/CREAT SERPL: 21.1 (ref 7–25)
CALCIUM SPEC-SCNC: 9.7 MG/DL (ref 8.6–10.5)
CHLORIDE SERPL-SCNC: 98 MMOL/L (ref 98–107)
CO2 SERPL-SCNC: 27.5 MMOL/L (ref 22–29)
CREAT SERPL-MCNC: 1.33 MG/DL (ref 0.57–1)
CRP SERPL-MCNC: <0.3 MG/DL (ref 0–0.5)
EGFRCR SERPLBLD CKD-EPI 2021: 40.8 ML/MIN/1.73
GLOBULIN UR ELPH-MCNC: 3.8 GM/DL
GLUCOSE SERPL-MCNC: 95 MG/DL (ref 65–99)
POTASSIUM SERPL-SCNC: 5.1 MMOL/L (ref 3.5–5.2)
PROT SERPL-MCNC: 7.8 G/DL (ref 6–8.5)
SODIUM SERPL-SCNC: 137 MMOL/L (ref 136–145)

## 2025-01-30 ENCOUNTER — PRE-ADMISSION TESTING (OUTPATIENT)
Dept: PREADMISSION TESTING | Facility: HOSPITAL | Age: 80
End: 2025-01-30
Payer: MEDICARE

## 2025-01-30 VITALS — WEIGHT: 136.35 LBS | BODY MASS INDEX: 26.77 KG/M2 | HEIGHT: 60 IN

## 2025-01-30 DIAGNOSIS — M48.062 LUMBAR STENOSIS WITH NEUROGENIC CLAUDICATION: ICD-10-CM

## 2025-01-30 LAB — HBA1C MFR BLD: 5.4 % (ref 4.8–5.6)

## 2025-01-30 PROCEDURE — 87081 CULTURE SCREEN ONLY: CPT

## 2025-01-30 PROCEDURE — 36415 COLL VENOUS BLD VENIPUNCTURE: CPT

## 2025-01-30 PROCEDURE — 83036 HEMOGLOBIN GLYCOSYLATED A1C: CPT

## 2025-01-30 NOTE — PAT
An arrival time for procedure was not provided during PAT visit. If patient had any questions or concerns about their arrival time, they were instructed to contact their surgeon/physician.  Additionally, if the patient referred to an arrival time that was acquired from their my chart account, patient was encouraged to verify that time with their surgeon/physician. Arrival times are NOT provided in Pre Admission Testing Department.    Patient instructed to drink 20 ounces of Gatorade or Gatorlyte (if diabetic) and it needs to be completed 1 hour (for Main OR patients) or 2 hours ([facility]) before given arrival time for procedure (NO RED Gatorade and NO Gatorade Zero).    Patient verbalized understanding.      Patient's surgeon called in a prescription for Chlorhexidine wash & Bactroban nasal ointment to patient's pharmacy.  Patient verbalized that she had already picked up both the Chlorhexidine and Bactroban.  Verbal and written instructions given regarding proper use of the Chlorhexidine wash & Bactroban nasal ointment were provided to patient and/or morrolily during PAT visit. Patient/family also instructed to complete Chlorhexidine wash & Bactroban nasal ointment checklist and return it to Pre-op on the day of surgery.  Patient and/or family verbalized understanding.     Patient to apply Chlorhexadine wipes  to surgical area (as instructed) the night before procedure and the AM of procedure. Wipes provided.

## 2025-01-30 NOTE — TELEPHONE ENCOUNTER
This patient's surgery is scheduled for 2/3/25. I have patient on the list to be scheduled at [facility] due 3/3/25 (one month after surgery). I discussed this with patient and to hold MTX 2 weeks before and after surgery. She verbalized understanding.

## 2025-01-31 LAB — MRSA SPEC QL CULT: NORMAL

## 2025-02-02 ENCOUNTER — ANESTHESIA EVENT (OUTPATIENT)
Dept: PERIOP | Facility: HOSPITAL | Age: 80
End: 2025-02-02
Payer: MEDICARE

## 2025-02-02 RX ORDER — SODIUM CHLORIDE 0.9 % (FLUSH) 0.9 %
10 SYRINGE (ML) INJECTION AS NEEDED
Status: CANCELLED | OUTPATIENT
Start: 2025-02-02

## 2025-02-02 RX ORDER — SODIUM CHLORIDE 0.9 % (FLUSH) 0.9 %
10 SYRINGE (ML) INJECTION EVERY 12 HOURS SCHEDULED
Status: CANCELLED | OUTPATIENT
Start: 2025-02-02

## 2025-02-03 ENCOUNTER — ANESTHESIA (OUTPATIENT)
Dept: PERIOP | Facility: HOSPITAL | Age: 80
End: 2025-02-03
Payer: MEDICARE

## 2025-02-03 ENCOUNTER — HOSPITAL ENCOUNTER (INPATIENT)
Facility: HOSPITAL | Age: 80
LOS: 3 days | Discharge: HOME | End: 2025-02-06
Attending: NEUROLOGICAL SURGERY | Admitting: NEUROLOGICAL SURGERY
Payer: MEDICARE

## 2025-02-03 ENCOUNTER — APPOINTMENT (OUTPATIENT)
Dept: GENERAL RADIOLOGY | Facility: HOSPITAL | Age: 80
End: 2025-02-03
Payer: MEDICARE

## 2025-02-03 DIAGNOSIS — M06.9 RHEUMATOID ARTHRITIS INVOLVING MULTIPLE SITES, UNSPECIFIED WHETHER RHEUMATOID FACTOR PRESENT: Primary | ICD-10-CM

## 2025-02-03 DIAGNOSIS — M48.062 LUMBAR STENOSIS WITH NEUROGENIC CLAUDICATION: ICD-10-CM

## 2025-02-03 PROBLEM — Z98.890 S/P LUMBAR LAMINECTOMY: Status: ACTIVE | Noted: 2025-02-03

## 2025-02-03 LAB
ANION GAP SERPL CALCULATED.3IONS-SCNC: 12 MMOL/L (ref 5–15)
BUN SERPL-MCNC: 35 MG/DL (ref 8–23)
BUN/CREAT SERPL: 25.7 (ref 7–25)
CALCIUM SPEC-SCNC: 9.3 MG/DL (ref 8.6–10.5)
CHLORIDE SERPL-SCNC: 99 MMOL/L (ref 98–107)
CO2 SERPL-SCNC: 26 MMOL/L (ref 22–29)
CREAT SERPL-MCNC: 1.36 MG/DL (ref 0.57–1)
EGFRCR SERPLBLD CKD-EPI 2021: 39.7 ML/MIN/1.73
GLUCOSE SERPL-MCNC: 94 MG/DL (ref 65–99)
POTASSIUM SERPL-SCNC: 4.1 MMOL/L (ref 3.5–5.2)
SODIUM SERPL-SCNC: 137 MMOL/L (ref 136–145)

## 2025-02-03 PROCEDURE — 63710000001 OXYCODONE 10 MG TABLET EXTENDED-RELEASE 12 HOUR: Performed by: NEUROLOGICAL SURGERY

## 2025-02-03 PROCEDURE — 63047 LAM FACETEC & FORAMOT LUMBAR: CPT

## 2025-02-03 PROCEDURE — 01NB0ZZ RELEASE LUMBAR NERVE, OPEN APPROACH: ICD-10-PCS | Performed by: NEUROLOGICAL SURGERY

## 2025-02-03 PROCEDURE — C1889 IMPLANT/INSERT DEVICE, NOC: HCPCS | Performed by: NEUROLOGICAL SURGERY

## 2025-02-03 PROCEDURE — 25010000002 PROPOFOL 10 MG/ML EMULSION

## 2025-02-03 PROCEDURE — 25010000002 CEFAZOLIN PER 500 MG: Performed by: NEUROLOGICAL SURGERY

## 2025-02-03 PROCEDURE — 25010000002 LIDOCAINE PF 1% 1 % SOLUTION

## 2025-02-03 PROCEDURE — A9270 NON-COVERED ITEM OR SERVICE: HCPCS | Performed by: NEUROLOGICAL SURGERY

## 2025-02-03 PROCEDURE — 25010000002 DEXAMETHASONE PER 1 MG

## 2025-02-03 PROCEDURE — 63047 LAM FACETEC & FORAMOT LUMBAR: CPT | Performed by: NEUROLOGICAL SURGERY

## 2025-02-03 PROCEDURE — C1713 ANCHOR/SCREW BN/BN,TIS/BN: HCPCS | Performed by: NEUROLOGICAL SURGERY

## 2025-02-03 PROCEDURE — 25810000003 LACTATED RINGERS PER 1000 ML: Performed by: NEUROLOGICAL SURGERY

## 2025-02-03 PROCEDURE — 76000 FLUOROSCOPY <1 HR PHYS/QHP: CPT

## 2025-02-03 PROCEDURE — 63048 LAM FACETEC &FORAMOT EA ADDL: CPT | Performed by: NEUROLOGICAL SURGERY

## 2025-02-03 PROCEDURE — 63710000001 DIPHENHYDRAMINE PER 50 MG: Performed by: NEUROLOGICAL SURGERY

## 2025-02-03 PROCEDURE — 25010000002 MORPHINE PER 10 MG: Performed by: NEUROLOGICAL SURGERY

## 2025-02-03 PROCEDURE — 25010000002 HYDROMORPHONE 1 MG/ML SOLUTION

## 2025-02-03 PROCEDURE — 25010000002 SUGAMMADEX 200 MG/2ML SOLUTION

## 2025-02-03 PROCEDURE — 25010000002 ONDANSETRON PER 1 MG

## 2025-02-03 PROCEDURE — 25010000002 LIDOCAINE PF 1% 1 % SOLUTION: Performed by: ANESTHESIOLOGY

## 2025-02-03 PROCEDURE — 63710000001 FAMOTIDINE 20 MG TABLET: Performed by: NEUROLOGICAL SURGERY

## 2025-02-03 PROCEDURE — 00QT0ZZ REPAIR SPINAL MENINGES, OPEN APPROACH: ICD-10-PCS | Performed by: NEUROLOGICAL SURGERY

## 2025-02-03 PROCEDURE — 63048 LAM FACETEC &FORAMOT EA ADDL: CPT

## 2025-02-03 PROCEDURE — 25810000003 SODIUM CHLORIDE PER 500 ML: Performed by: NEUROLOGICAL SURGERY

## 2025-02-03 PROCEDURE — 25010000002 FENTANYL CITRATE (PF) 100 MCG/2ML SOLUTION

## 2025-02-03 PROCEDURE — 25810000003 LACTATED RINGERS PER 1000 ML: Performed by: ANESTHESIOLOGY

## 2025-02-03 PROCEDURE — 80048 BASIC METABOLIC PNL TOTAL CA: CPT | Performed by: NEUROLOGICAL SURGERY

## 2025-02-03 PROCEDURE — 63710000001 MINERAL OIL OIL 10 ML VIAL: Performed by: NEUROLOGICAL SURGERY

## 2025-02-03 DEVICE — SSC BONE WAX
Type: IMPLANTABLE DEVICE | Site: BACK | Status: FUNCTIONAL
Brand: SSC BONE WAX

## 2025-02-03 DEVICE — AVITENE ULTRAFOAM, 8 CM X 12.5 CM (3-1/8" X 5"), 100 SQ CM
Type: IMPLANTABLE DEVICE | Site: BACK | Status: FUNCTIONAL
Brand: AVITENE

## 2025-02-03 DEVICE — ADHERUS AUTOSPRAY EXTENDED TIP (ET) DURAL SEALANT IS A STERILE, SINGLE-USE, ELECTROMECHANICAL, BATTERY OPERATED DEVICE WITH INTERNAL SYSTEM COMPONENTS THAT PROVIDE AIR FLOW TO AID IN THE DELIVERY OF A SYNTHETIC, ABSORBABLE, TWO-COMPONENT HYDROGEL SEALANT SYSTEM AND ALLOW DELIVERY TO BE INTERRUPTED WITHOUT CLOGGING.
Type: IMPLANTABLE DEVICE | Site: SPINE LUMBAR | Status: FUNCTIONAL
Brand: ADHERUS AUTOSPRAY ET DURAL SEALANT

## 2025-02-03 DEVICE — FLOSEAL WITH RECOTHROM - 10ML.
Type: IMPLANTABLE DEVICE | Site: BACK | Status: FUNCTIONAL
Brand: FLOSEAL HEMOSTATIC MATRIX

## 2025-02-03 RX ORDER — TETRACAINE HYDROCHLORIDE 5 MG/ML
SOLUTION OPHTHALMIC
Status: COMPLETED
Start: 2025-02-03 | End: 2025-02-03

## 2025-02-03 RX ORDER — OXYCODONE AND ACETAMINOPHEN 7.5; 325 MG/1; MG/1
1 TABLET ORAL EVERY 4 HOURS PRN
Status: DISCONTINUED | OUTPATIENT
Start: 2025-02-03 | End: 2025-02-06 | Stop reason: HOSPADM

## 2025-02-03 RX ORDER — BUPROPION HYDROCHLORIDE 150 MG/1
150 TABLET ORAL DAILY
Status: DISCONTINUED | OUTPATIENT
Start: 2025-02-03 | End: 2025-02-06 | Stop reason: HOSPADM

## 2025-02-03 RX ORDER — ONDANSETRON 2 MG/ML
4 INJECTION INTRAMUSCULAR; INTRAVENOUS ONCE AS NEEDED
Status: DISCONTINUED | OUTPATIENT
Start: 2025-02-03 | End: 2025-02-03 | Stop reason: HOSPADM

## 2025-02-03 RX ORDER — ACETAMINOPHEN 500 MG
1000 TABLET ORAL ONCE
Status: DISCONTINUED | OUTPATIENT
Start: 2025-02-03 | End: 2025-02-03 | Stop reason: HOSPADM

## 2025-02-03 RX ORDER — ONDANSETRON 4 MG/1
4 TABLET, ORALLY DISINTEGRATING ORAL EVERY 6 HOURS PRN
Status: DISCONTINUED | OUTPATIENT
Start: 2025-02-03 | End: 2025-02-06 | Stop reason: HOSPADM

## 2025-02-03 RX ORDER — SODIUM CHLORIDE 9 MG/ML
40 INJECTION, SOLUTION INTRAVENOUS AS NEEDED
Status: DISCONTINUED | OUTPATIENT
Start: 2025-02-03 | End: 2025-02-06 | Stop reason: HOSPADM

## 2025-02-03 RX ORDER — POLYETHYLENE GLYCOL 3350 17 G/17G
17 POWDER, FOR SOLUTION ORAL DAILY PRN
Status: DISCONTINUED | OUTPATIENT
Start: 2025-02-03 | End: 2025-02-06 | Stop reason: HOSPADM

## 2025-02-03 RX ORDER — ROCURONIUM BROMIDE 10 MG/ML
INJECTION, SOLUTION INTRAVENOUS AS NEEDED
Status: DISCONTINUED | OUTPATIENT
Start: 2025-02-03 | End: 2025-02-03 | Stop reason: SURG

## 2025-02-03 RX ORDER — ONDANSETRON 2 MG/ML
4 INJECTION INTRAMUSCULAR; INTRAVENOUS EVERY 6 HOURS PRN
Status: DISCONTINUED | OUTPATIENT
Start: 2025-02-03 | End: 2025-02-06 | Stop reason: HOSPADM

## 2025-02-03 RX ORDER — BISACODYL 5 MG/1
5 TABLET, DELAYED RELEASE ORAL DAILY PRN
Status: DISCONTINUED | OUTPATIENT
Start: 2025-02-03 | End: 2025-02-06 | Stop reason: HOSPADM

## 2025-02-03 RX ORDER — FENTANYL CITRATE 50 UG/ML
50 INJECTION, SOLUTION INTRAMUSCULAR; INTRAVENOUS
Status: DISCONTINUED | OUTPATIENT
Start: 2025-02-03 | End: 2025-02-03 | Stop reason: HOSPADM

## 2025-02-03 RX ORDER — DEXAMETHASONE SODIUM PHOSPHATE 4 MG/ML
INJECTION, SOLUTION INTRA-ARTICULAR; INTRALESIONAL; INTRAMUSCULAR; INTRAVENOUS; SOFT TISSUE AS NEEDED
Status: DISCONTINUED | OUTPATIENT
Start: 2025-02-03 | End: 2025-02-03 | Stop reason: SURG

## 2025-02-03 RX ORDER — SPIRONOLACTONE 25 MG/1
25 TABLET ORAL DAILY
Status: DISCONTINUED | OUTPATIENT
Start: 2025-02-03 | End: 2025-02-06 | Stop reason: HOSPADM

## 2025-02-03 RX ORDER — LIDOCAINE HYDROCHLORIDE 10 MG/ML
INJECTION, SOLUTION EPIDURAL; INFILTRATION; INTRACAUDAL; PERINEURAL AS NEEDED
Status: DISCONTINUED | OUTPATIENT
Start: 2025-02-03 | End: 2025-02-03 | Stop reason: SURG

## 2025-02-03 RX ORDER — LIDOCAINE HYDROCHLORIDE 10 MG/ML
0.5 INJECTION, SOLUTION EPIDURAL; INFILTRATION; INTRACAUDAL; PERINEURAL ONCE AS NEEDED
Status: COMPLETED | OUTPATIENT
Start: 2025-02-03 | End: 2025-02-03

## 2025-02-03 RX ORDER — MINERAL OIL
OIL (ML) MISCELLANEOUS AS NEEDED
Status: DISCONTINUED | OUTPATIENT
Start: 2025-02-03 | End: 2025-02-03 | Stop reason: HOSPADM

## 2025-02-03 RX ORDER — SODIUM CHLORIDE 9 MG/ML
INJECTION, SOLUTION INTRAVENOUS AS NEEDED
Status: DISCONTINUED | OUTPATIENT
Start: 2025-02-03 | End: 2025-02-03 | Stop reason: HOSPADM

## 2025-02-03 RX ORDER — TETRACAINE HYDROCHLORIDE 5 MG/ML
2 SOLUTION OPHTHALMIC ONCE
Status: COMPLETED | OUTPATIENT
Start: 2025-02-03 | End: 2025-02-03

## 2025-02-03 RX ORDER — AMOXICILLIN 250 MG
2 CAPSULE ORAL 2 TIMES DAILY
Status: DISCONTINUED | OUTPATIENT
Start: 2025-02-03 | End: 2025-02-06 | Stop reason: HOSPADM

## 2025-02-03 RX ORDER — NALOXONE HCL 0.4 MG/ML
0.4 VIAL (ML) INJECTION
Status: DISCONTINUED | OUTPATIENT
Start: 2025-02-03 | End: 2025-02-06 | Stop reason: HOSPADM

## 2025-02-03 RX ORDER — OXYCODONE HCL 10 MG/1
10 TABLET, FILM COATED, EXTENDED RELEASE ORAL ONCE
Status: COMPLETED | OUTPATIENT
Start: 2025-02-03 | End: 2025-02-03

## 2025-02-03 RX ORDER — ACETAMINOPHEN 160 MG/5ML
650 SOLUTION ORAL EVERY 4 HOURS PRN
Status: DISCONTINUED | OUTPATIENT
Start: 2025-02-03 | End: 2025-02-06 | Stop reason: HOSPADM

## 2025-02-03 RX ORDER — SODIUM CHLORIDE 0.9 % (FLUSH) 0.9 %
10 SYRINGE (ML) INJECTION AS NEEDED
Status: DISCONTINUED | OUTPATIENT
Start: 2025-02-03 | End: 2025-02-06 | Stop reason: HOSPADM

## 2025-02-03 RX ORDER — MAGNESIUM HYDROXIDE 1200 MG/15ML
LIQUID ORAL AS NEEDED
Status: DISCONTINUED | OUTPATIENT
Start: 2025-02-03 | End: 2025-02-03 | Stop reason: HOSPADM

## 2025-02-03 RX ORDER — FENTANYL CITRATE 50 UG/ML
INJECTION, SOLUTION INTRAMUSCULAR; INTRAVENOUS AS NEEDED
Status: DISCONTINUED | OUTPATIENT
Start: 2025-02-03 | End: 2025-02-03 | Stop reason: SURG

## 2025-02-03 RX ORDER — SODIUM CHLORIDE 0.9 % (FLUSH) 0.9 %
3 SYRINGE (ML) INJECTION EVERY 12 HOURS SCHEDULED
Status: DISCONTINUED | OUTPATIENT
Start: 2025-02-03 | End: 2025-02-06

## 2025-02-03 RX ORDER — ACETAMINOPHEN 325 MG/1
650 TABLET ORAL EVERY 4 HOURS PRN
Status: DISCONTINUED | OUTPATIENT
Start: 2025-02-03 | End: 2025-02-06 | Stop reason: HOSPADM

## 2025-02-03 RX ORDER — DULOXETIN HYDROCHLORIDE 60 MG/1
60 CAPSULE, DELAYED RELEASE ORAL DAILY
Status: DISCONTINUED | OUTPATIENT
Start: 2025-02-03 | End: 2025-02-06 | Stop reason: HOSPADM

## 2025-02-03 RX ORDER — SODIUM CHLORIDE, SODIUM LACTATE, POTASSIUM CHLORIDE, CALCIUM CHLORIDE 600; 310; 30; 20 MG/100ML; MG/100ML; MG/100ML; MG/100ML
75 INJECTION, SOLUTION INTRAVENOUS CONTINUOUS
Status: DISCONTINUED | OUTPATIENT
Start: 2025-02-03 | End: 2025-02-04

## 2025-02-03 RX ORDER — FAMOTIDINE 20 MG/1
20 TABLET, FILM COATED ORAL
Status: COMPLETED | OUTPATIENT
Start: 2025-02-03 | End: 2025-02-03

## 2025-02-03 RX ORDER — SODIUM CHLORIDE, SODIUM LACTATE, POTASSIUM CHLORIDE, CALCIUM CHLORIDE 600; 310; 30; 20 MG/100ML; MG/100ML; MG/100ML; MG/100ML
9 INJECTION, SOLUTION INTRAVENOUS CONTINUOUS
Status: ACTIVE | OUTPATIENT
Start: 2025-02-04 | End: 2025-02-04

## 2025-02-03 RX ORDER — PROMETHAZINE HYDROCHLORIDE 12.5 MG/1
12.5 SUPPOSITORY RECTAL EVERY 6 HOURS PRN
Status: DISCONTINUED | OUTPATIENT
Start: 2025-02-03 | End: 2025-02-06 | Stop reason: HOSPADM

## 2025-02-03 RX ORDER — IBUPROFEN 800 MG/1
800 TABLET, FILM COATED ORAL ONCE
Status: DISCONTINUED | OUTPATIENT
Start: 2025-02-03 | End: 2025-02-03 | Stop reason: HOSPADM

## 2025-02-03 RX ORDER — ONDANSETRON 2 MG/ML
INJECTION INTRAMUSCULAR; INTRAVENOUS AS NEEDED
Status: DISCONTINUED | OUTPATIENT
Start: 2025-02-03 | End: 2025-02-03 | Stop reason: SURG

## 2025-02-03 RX ORDER — ATORVASTATIN CALCIUM 10 MG/1
10 TABLET, FILM COATED ORAL DAILY
Status: DISCONTINUED | OUTPATIENT
Start: 2025-02-03 | End: 2025-02-06 | Stop reason: HOSPADM

## 2025-02-03 RX ORDER — PROPOFOL 10 MG/ML
VIAL (ML) INTRAVENOUS AS NEEDED
Status: DISCONTINUED | OUTPATIENT
Start: 2025-02-03 | End: 2025-02-03 | Stop reason: SURG

## 2025-02-03 RX ORDER — DIPHENHYDRAMINE HCL 25 MG
25 CAPSULE ORAL NIGHTLY PRN
Status: DISCONTINUED | OUTPATIENT
Start: 2025-02-03 | End: 2025-02-06 | Stop reason: HOSPADM

## 2025-02-03 RX ORDER — MORPHINE SULFATE 2 MG/ML
1 INJECTION, SOLUTION INTRAMUSCULAR; INTRAVENOUS
Status: DISCONTINUED | OUTPATIENT
Start: 2025-02-03 | End: 2025-02-06 | Stop reason: HOSPADM

## 2025-02-03 RX ORDER — FOLIC ACID 1 MG/1
1 TABLET ORAL NIGHTLY
Status: DISCONTINUED | OUTPATIENT
Start: 2025-02-03 | End: 2025-02-06 | Stop reason: HOSPADM

## 2025-02-03 RX ORDER — FAMOTIDINE 20 MG/1
20 TABLET, FILM COATED ORAL EVERY OTHER DAY
Status: DISCONTINUED | OUTPATIENT
Start: 2025-02-03 | End: 2025-02-06 | Stop reason: HOSPADM

## 2025-02-03 RX ORDER — BISACODYL 10 MG
10 SUPPOSITORY, RECTAL RECTAL DAILY PRN
Status: DISCONTINUED | OUTPATIENT
Start: 2025-02-03 | End: 2025-02-06 | Stop reason: HOSPADM

## 2025-02-03 RX ORDER — BUPIVACAINE HYDROCHLORIDE AND EPINEPHRINE 2.5; 5 MG/ML; UG/ML
INJECTION, SOLUTION EPIDURAL; INFILTRATION; INTRACAUDAL; PERINEURAL AS NEEDED
Status: DISCONTINUED | OUTPATIENT
Start: 2025-02-03 | End: 2025-02-03 | Stop reason: HOSPADM

## 2025-02-03 RX ORDER — HYDROMORPHONE HYDROCHLORIDE 1 MG/ML
0.5 INJECTION, SOLUTION INTRAMUSCULAR; INTRAVENOUS; SUBCUTANEOUS
Status: DISCONTINUED | OUTPATIENT
Start: 2025-02-03 | End: 2025-02-03 | Stop reason: HOSPADM

## 2025-02-03 RX ORDER — HYDROCODONE BITARTRATE AND ACETAMINOPHEN 5; 325 MG/1; MG/1
1 TABLET ORAL EVERY 4 HOURS PRN
Status: DISCONTINUED | OUTPATIENT
Start: 2025-02-03 | End: 2025-02-06 | Stop reason: HOSPADM

## 2025-02-03 RX ORDER — MIDAZOLAM HYDROCHLORIDE 1 MG/ML
0.5 INJECTION, SOLUTION INTRAMUSCULAR; INTRAVENOUS
Status: DISCONTINUED | OUTPATIENT
Start: 2025-02-03 | End: 2025-02-03 | Stop reason: HOSPADM

## 2025-02-03 RX ORDER — PROMETHAZINE HYDROCHLORIDE 12.5 MG/1
12.5 TABLET ORAL EVERY 6 HOURS PRN
Status: DISCONTINUED | OUTPATIENT
Start: 2025-02-03 | End: 2025-02-06 | Stop reason: HOSPADM

## 2025-02-03 RX ORDER — FUROSEMIDE 80 MG/1
80 TABLET ORAL DAILY
Status: DISCONTINUED | OUTPATIENT
Start: 2025-02-03 | End: 2025-02-06 | Stop reason: HOSPADM

## 2025-02-03 RX ADMIN — SUGAMMADEX 200 MG: 100 INJECTION, SOLUTION INTRAVENOUS at 12:12

## 2025-02-03 RX ADMIN — OXYCODONE HYDROCHLORIDE 10 MG: 10 TABLET, FILM COATED, EXTENDED RELEASE ORAL at 08:50

## 2025-02-03 RX ADMIN — FAMOTIDINE 20 MG: 20 TABLET, FILM COATED ORAL at 08:50

## 2025-02-03 RX ADMIN — DEXAMETHASONE SODIUM PHOSPHATE 4 MG: 4 INJECTION INTRA-ARTICULAR; INTRALESIONAL; INTRAMUSCULAR; INTRAVENOUS; SOFT TISSUE at 10:03

## 2025-02-03 RX ADMIN — FENTANYL CITRATE 25 MCG: 50 INJECTION, SOLUTION INTRAMUSCULAR; INTRAVENOUS at 11:01

## 2025-02-03 RX ADMIN — LIDOCAINE HYDROCHLORIDE 40 MG: 10 INJECTION, SOLUTION EPIDURAL; INFILTRATION; INTRACAUDAL; PERINEURAL at 09:59

## 2025-02-03 RX ADMIN — PROPOFOL 50 MG: 10 INJECTION, EMULSION INTRAVENOUS at 09:59

## 2025-02-03 RX ADMIN — PROPOFOL 50 MG: 10 INJECTION, EMULSION INTRAVENOUS at 10:00

## 2025-02-03 RX ADMIN — HYDROMORPHONE HYDROCHLORIDE 0.5 MG: 1 INJECTION, SOLUTION INTRAMUSCULAR; INTRAVENOUS; SUBCUTANEOUS at 15:26

## 2025-02-03 RX ADMIN — SODIUM CHLORIDE, SODIUM LACTATE, POTASSIUM CHLORIDE, CALCIUM CHLORIDE 75 ML/HR: 20; 30; 600; 310 INJECTION, SOLUTION INTRAVENOUS at 18:48

## 2025-02-03 RX ADMIN — DIPHENHYDRAMINE HYDROCHLORIDE 25 MG: 25 CAPSULE ORAL at 20:58

## 2025-02-03 RX ADMIN — TETRACAINE HYDROCHLORIDE 2 DROP: 5 SOLUTION OPHTHALMIC at 15:33

## 2025-02-03 RX ADMIN — Medication 3 ML: at 20:59

## 2025-02-03 RX ADMIN — TRAZODONE HYDROCHLORIDE 150 MG: 50 TABLET ORAL at 20:54

## 2025-02-03 RX ADMIN — SODIUM CHLORIDE 2 G: 900 INJECTION INTRAVENOUS at 10:04

## 2025-02-03 RX ADMIN — BUPROPION HYDROCHLORIDE 150 MG: 150 TABLET, EXTENDED RELEASE ORAL at 20:54

## 2025-02-03 RX ADMIN — ONDANSETRON 4 MG: 2 INJECTION INTRAMUSCULAR; INTRAVENOUS at 09:58

## 2025-02-03 RX ADMIN — FAMOTIDINE 20 MG: 20 TABLET, FILM COATED ORAL at 20:54

## 2025-02-03 RX ADMIN — FENTANYL CITRATE 25 MCG: 50 INJECTION, SOLUTION INTRAMUSCULAR; INTRAVENOUS at 10:18

## 2025-02-03 RX ADMIN — FENTANYL CITRATE 50 MCG: 50 INJECTION, SOLUTION INTRAMUSCULAR; INTRAVENOUS at 09:59

## 2025-02-03 RX ADMIN — LIDOCAINE HYDROCHLORIDE 0.5 ML: 10 INJECTION, SOLUTION EPIDURAL; INFILTRATION; INTRACAUDAL; PERINEURAL at 08:50

## 2025-02-03 RX ADMIN — HYDROMORPHONE HYDROCHLORIDE 0.5 MG: 1 INJECTION, SOLUTION INTRAMUSCULAR; INTRAVENOUS; SUBCUTANEOUS at 12:46

## 2025-02-03 RX ADMIN — ATORVASTATIN CALCIUM 10 MG: 10 TABLET, FILM COATED ORAL at 20:54

## 2025-02-03 RX ADMIN — ROCURONIUM BROMIDE 10 MG: 10 INJECTION INTRAVENOUS at 11:25

## 2025-02-03 RX ADMIN — SODIUM CHLORIDE, POTASSIUM CHLORIDE, SODIUM LACTATE AND CALCIUM CHLORIDE: 600; 310; 30; 20 INJECTION, SOLUTION INTRAVENOUS at 09:54

## 2025-02-03 RX ADMIN — MORPHINE SULFATE 1 MG: 2 INJECTION, SOLUTION INTRAMUSCULAR; INTRAVENOUS at 20:58

## 2025-02-03 RX ADMIN — FOLIC ACID 1 MG: 1 TABLET ORAL at 20:58

## 2025-02-03 RX ADMIN — PROPOFOL 25 MCG/KG/MIN: 10 INJECTION, EMULSION INTRAVENOUS at 10:07

## 2025-02-03 RX ADMIN — DULOXETINE HYDROCHLORIDE 60 MG: 60 CAPSULE, DELAYED RELEASE ORAL at 20:54

## 2025-02-03 RX ADMIN — ROCURONIUM BROMIDE 50 MG: 10 INJECTION INTRAVENOUS at 10:00

## 2025-02-03 NOTE — PAYOR COMM NOTE
"Krysta Del Toro RN  [facility]  Utilization Management  P:[phone]  F:[phone]    Ref # [id number]   OP-IP SX due to intra-op complication     Apple Adkins \"John\" (79 y.o. Female)       Date of Birth   1945    Social Security Number       Address   [address] [place] [address]    Home Phone   [phone]    MRN   [id number]       Anabaptist   Christianity    Marital Status                               Admission Date   2/3/25    Admission Type   Elective    Admitting Provider   Eliezer Cavazos MD    Attending Provider   Eliezer Cavazos MD    Department, Room/Bed   [facility] OR, [facility] OR/MAIN OR       Discharge Date       Discharge Disposition       Discharge Destination                                 Attending Provider: Eliezer Cavazos MD    Allergies: Gabapentin, Hydroxychloroquine Sulfate, Penicillins, Vancomycin    Isolation: None   Infection: None   Code Status: CPR    Ht: 152.4 cm (60\")   Wt: 61.7 kg (136 lb)    Admission Cmt: None   Principal Problem: Lumbar stenosis with neurogenic claudication [M48.062]                   Active Insurance as of 2/3/2025       Primary Coverage       Payor Plan Insurance Group Employer/Plan Group    ANTHEM MEDICARE REPLACEMENT ANTHEM MED ADV HMO [id number]       Payor Plan Address Payor Plan Phone Number Payor Plan Fax Number Effective Dates    [address] [phone]  1/1/2025 - None Entered    [facility] [id number]         Subscriber Name Subscriber Birth Date Member ID       APPLE ADKINS 1945 [id number]                     Emergency Contacts        (Rel.) Home Phone Work Phone Mobile Phone    Josselin Zamora (Daughter) [phone] -- [phone]    [email or web address] (Daughter) -- -- [phone]                 History & Physical        Nani Mondragon APRN at 02/03/25 0829       Attestation signed by Eliezer Cavazos MD at 02/03/25 0930    .                [facility] " [facility] Pre-op    Full history and physical note from office is attached.    +cardiac clearance     VS: /80  HR 84  RR 16  T 98.0  Sat 96%RA    Immunizations:  Influenza:  UTD  Pneumococcal:  No  Tetanus:  UTD    Review of Systems:  Constitutional-- No fever, chills or sweats. No fatigue.  CV-- No chest pain, palpitation or syncope  Resp-- No SOB, cough, hemoptysis  Skin--Left scalp abrasion     Physical Exam:  Heart:   Regular rate and rhythm, S1 and S2 normal  Lungs: Clear to auscultation bilaterally, respirations unlabored    LAB Results:  Lab Results   Component Value Date    WBC 14.77 (H) 01/27/2025    HGB 13.4 01/27/2025    HCT 39.7 01/27/2025    MCV 91.9 01/27/2025     01/27/2025    NEUTROABS 9.59 (H) 01/27/2025    GLUCOSE 95 01/27/2025    BUN 28 (H) 01/27/2025    CREATININE 1.33 (H) 01/27/2025    EGFRIFNONA 67 12/15/2018     01/27/2025    K 5.1 01/27/2025    CL 98 01/27/2025    CO2 27.5 01/27/2025    MG 1.8 04/05/2016    PHOS 5.2 (H) 04/05/2016    CALCIUM 9.7 01/27/2025    ALBUMIN 4.0 01/27/2025    AST 25 01/27/2025    ALT 10 01/27/2025    BILITOT 0.3 01/27/2025    PTT 23 (L) 04/03/2016    INR 0.96 04/03/2016       Study Result    Narrative & Impression      MRI LUMBAR SPINE WO CONTRAST     Date of Exam: 11/7/2024 12:27 PM EST     Indication: M48.062.     Comparison: None available.     Technique:  Routine multiplanar/multisequence sequence images of the lumbar spine were obtained without contrast administration.       Findings:   T1 marrow signal is preserved, without evidence of fracture or suspicious marrow replacing lesion. There is mild mid lumbar levocurvature present in addition to minimal retrolisthesis of L3 on L4. The conus medullaris appears normal. The cauda equina   nerve roots demonstrate some redundancy secondary to areas of severe spinal canal stenosis. The paraspinal soft tissues demonstrate no acute or suspicious findings. Multilevel spondylosis is present, with areas  of involvement including     L1-2, bilateral facet arthropathy is present with associated mild to moderate right neuroforaminal narrowing. The spinal canal is patent.     L2-3, circumferential disc bulge with ligamentum flavum thickening and prominent right-sided facet arthropathy. There is moderate to severe spinal canal narrowing in addition to moderate to severe right neuroforaminal stenosis.     L3-4, circumferential disc bulge with prominent ligamentum flavum thickening and bilateral facet arthropathy. There is severe spinal canal narrowing with effacement of the thecal sac and some central crowding of the cauda equina nerve roots which   demonstrate associated redundancy. There is also moderate to severe bilateral neuroforaminal narrowing, fairly symmetric.     L4-5, circumferential disc bulge with prominent ligamentum flavum thickening and bilateral facet arthropathy. There is severe spinal canal stenosis as well as severe left and mild to moderate right neuroforaminal narrowing.     L5-S1, circumferential disc bulge with component of left-sided disc protrusion in addition to bilateral facet arthropathy. There is mild spinal canal narrowing as well as moderate to severe narrowing of the lateral aspect of the left neural foramen and   mild right neuroforaminal stenosis.     Impression:   Advanced multilevel lumbar spondylosis is present as above, with contributing component of mid lumbar levocurvature. The L3-4 and L4-5 levels demonstrate severe spinal canal narrowing, including effacement of the thecal sac with some crowding of the   cauda equina nerve roots. There is also multilevel advanced neuroforaminal stenosis.     Cancer Staging (if applicable)  Cancer Patient: __ yes __no __unknown__N/A; If yes, clinical stage T:__ N:__M:__, stage group or __N/A      Impression: Lumbar stenosis with neurogenic claudication       Plan: Lumbar laminectomy L2-5       Nani Mondragon, BRIANA   2/3/2025   08:29  EST    Electronically signed by Eliezer Cavazos MD at 25 [id number]   Source Note: H&P (View-Only)          Patient: Stacia Adkins  : 1945     Primary Care Provider: Provider, No Known     Requesting Provider: As above           History     Chief Complaint: Low back and right leg pain with walking and standing intolerance.     History of Present Illness: Ms. Adkins is a 79-year-old woman who I saw a decade ago.  She has a 30-year history of progressive low back pain.  Over the last 2 months it has been quite profound.  Pain extends from her back into the buttock and into the side of the right thigh crossing over into the more medial calf and into the bottom of her right foot.  Symptoms occur with standing and walking and improve when she sits down or lies down.  She has had some pain management intervention which was not helpful.  She is intolerant of physical therapy.  She is not anticoagulated but does have a cardiologist.  She takes methotrexate.  She denies bowel or bladder dysfunction.     Review of Systems   Constitutional:  Negative for activity change, appetite change, chills, diaphoresis, fatigue, fever and unexpected weight change.   HENT:  Negative for congestion, dental problem, drooling, ear discharge, ear pain, facial swelling, hearing loss, mouth sores, nosebleeds, postnasal drip, rhinorrhea, sinus pressure, sneezing, sore throat, tinnitus, trouble swallowing and voice change.    Eyes:  Negative for photophobia, pain, discharge, redness, itching and visual disturbance.   Respiratory:  Negative for apnea, cough, choking, chest tightness, shortness of breath, wheezing and stridor.    Cardiovascular:  Negative for chest pain, palpitations and leg swelling.   Gastrointestinal:  Negative for abdominal distention, abdominal pain, anal bleeding, blood in stool, constipation, diarrhea, nausea, rectal pain and vomiting.   Endocrine: Negative for cold intolerance, heat intolerance, polydipsia,  "polyphagia and polyuria.   Genitourinary:  Negative for decreased urine volume, difficulty urinating, dysuria, enuresis, flank pain, frequency, genital sores, hematuria and urgency.   Musculoskeletal:  Positive for arthralgias and back pain. Negative for gait problem, joint swelling, myalgias, neck pain and neck stiffness.   Skin:  Negative for color change, pallor, rash and wound.   Allergic/Immunologic: Negative for environmental allergies, food allergies and immunocompromised state.   Neurological:  Negative for dizziness, tremors, seizures, syncope, facial asymmetry, speech difficulty, weakness, light-headedness, numbness and headaches.   Hematological:  Negative for adenopathy. Does not bruise/bleed easily.   Psychiatric/Behavioral:  Negative for agitation, behavioral problems, confusion, decreased concentration, dysphoric mood, hallucinations, self-injury, sleep disturbance and suicidal ideas. The patient is not nervous/anxious and is not hyperactive.    All other systems reviewed and are negative.        The patient's past medical history, past surgical history, family history, and social history have been reviewed at length in the electronic medical record.     Past Medical History:   Diagnosis Date    Anemia     Arthritis, rheumatoid     Back pain     Cervical spondylosis     CHF (congestive heart failure)     Degenerative arthritis of lumbar spine     Dupuytrens contracture     High risk medication use     History of aortic valve replacement     Renal insufficiency     Seropositive rheumatoid arthritis     Vision loss of left eye     Vitamin D deficiency      Past Surgical History:   Procedure Laterality Date    CARDIAC VALVE SURGERY      EPIDURAL STIMULATOR INSERTION  12/2011    Thoracic epidural stimulator- Dr. Clark    EPIDURAL STIMULATOR INSERTION  06/2012    \"Lead flipped\"- Unit was replaced, complicated by MRSA infection and removed thereafter.    OTHER SURGICAL HISTORY  03/06/2014    Epidural " "stimulator wound I&D- Dr. Castro    SHOULDER SURGERY       Family History   Problem Relation Age of Onset    Arthritis Father      Social History     Socioeconomic History    Marital status:    Tobacco Use    Smoking status: Never     Passive exposure: Never    Smokeless tobacco: Never   Vaping Use    Vaping status: Never Used   Substance and Sexual Activity    Alcohol use: No    Drug use: No    Sexual activity: Defer           Allergies   Allergen Reactions    Gabapentin Other (See Comments)     \"Out of body experience\"    Hydroxychloroquine Sulfate Itching    Penicillins Rash    Vancomycin Hives       Current Outpatient Medications on File Prior to Visit   Medication Sig Dispense Refill    abatacept (Orencia) 250 MG injection 30 mL Every 28 (Twenty-Eight) Days.      buPROPion XL (WELLBUTRIN XL) 150 MG 24 hr tablet Take 1 tablet by mouth Daily.      citalopram (CeleXA) 20 MG tablet       denosumab (Prolia) 60 MG/ML solution prefilled syringe syringe Inject 1 mL under the skin into the appropriate area as directed Every 6 (Six) Months.      DULoxetine (CYMBALTA) 30 MG capsule Take 1 capsule by mouth Daily. 90 capsule 1    DULoxetine (CYMBALTA) 60 MG capsule Take 1 capsule by mouth Daily. 90 capsule 1    famotidine (PEPCID) 20 MG tablet       folic acid (FOLVITE) 1 MG tablet Take 1 tablet by mouth Every Night. 90 tablet 1    furosemide (LASIX) 80 MG tablet Take 1 tablet by mouth Daily.      HYDROcodone-acetaminophen (NORCO)  MG per tablet Take 1 tablet by mouth 2 (Two) Times a Day As Needed for Moderate Pain.      methotrexate 2.5 MG tablet Take 7 tablets by mouth 1 (One) Time Per Week. On Sundays. 84 tablet 0    simvastatin (ZOCOR) 20 MG tablet       spironolactone (ALDACTONE) 25 MG tablet Take 1 tablet by mouth Daily.      traZODone (DESYREL) 100 MG tablet Take 1.5 tablets by mouth Every Night.       No current facility-administered medications on file prior to visit.          Physical Exam:   Resp 17 " "  Ht 152.4 cm (60\")   Wt 61.7 kg (136 lb)   BMI 26.56 kg/m²   CONSTITUTIONAL: Patient is well-nourished, pleasant and appears stated age.  MUSCULOSKELETAL:  Straight leg raising is negative.  Branden's Sign is negative.  ROM in the low back is limited in all directions.  Tenderness in the back to palpation is not observed.  NEUROLOGICAL:  Orientation, memory, attention span, language function, and cognition have been examined and are intact.  Strength is intact in the lower extremities to direct testing.  Muscle tone is normal throughout.  Station and gait are somewhat looped.  Sensation is intact to light touch testing throughout.  Deep tendon reflexes are difficult to elicit throughout.  Coordination is intact.        Medical Decision Making     Data Review:   (All imaging studies were personally reviewed unless stated otherwise)  MRI of the lumbar spine dated 11/7/2024 demonstrate diffuse degenerative disc disease and facet arthropathy.  There could be a trace offset of L4 and L5.  There is mild recess narrowing on the right at L1-2.  There is severe central stenosis with nerve root redundancy at L2-3, L3-4, and L4-5.     Diagnosis:   1.  Lumbar stenosis with neurogenic claudication and radiculopathy.  2.  Chronic mechanical low back pain.     Treatment Options:   The patient is in agony.  I have recommended decompressive laminectomies from L2-5.  The goal would be to improve her radicular symptoms as well as her walking and standing intolerance.  This is not a cure all for her longstanding mechanical low back pain.  The nature of the procedure as well as the potential risks, complications, limitations, and alternatives to the procedure were discussed at length with the patient and the patient has agreed to proceed with surgery.  She will need to come off of her methotrexate 1-2 weeks before her surgery.  Formal cardiac clearance will be necessary.    Electronically signed by Eliezer Cavazos MD at 01/21/25 " 1626                 Eliezer Cavazos MD at 25 1625          Patient: Stacia Adkins  : 1945     Primary Care Provider: Provider, No Known     Requesting Provider: As above           History     Chief Complaint: Low back and right leg pain with walking and standing intolerance.     History of Present Illness: Ms. Adkins is a 79-year-old woman who I saw a decade ago.  She has a 30-year history of progressive low back pain.  Over the last 2 months it has been quite profound.  Pain extends from her back into the buttock and into the side of the right thigh crossing over into the more medial calf and into the bottom of her right foot.  Symptoms occur with standing and walking and improve when she sits down or lies down.  She has had some pain management intervention which was not helpful.  She is intolerant of physical therapy.  She is not anticoagulated but does have a cardiologist.  She takes methotrexate.  She denies bowel or bladder dysfunction.     Review of Systems   Constitutional:  Negative for activity change, appetite change, chills, diaphoresis, fatigue, fever and unexpected weight change.   HENT:  Negative for congestion, dental problem, drooling, ear discharge, ear pain, facial swelling, hearing loss, mouth sores, nosebleeds, postnasal drip, rhinorrhea, sinus pressure, sneezing, sore throat, tinnitus, trouble swallowing and voice change.    Eyes:  Negative for photophobia, pain, discharge, redness, itching and visual disturbance.   Respiratory:  Negative for apnea, cough, choking, chest tightness, shortness of breath, wheezing and stridor.    Cardiovascular:  Negative for chest pain, palpitations and leg swelling.   Gastrointestinal:  Negative for abdominal distention, abdominal pain, anal bleeding, blood in stool, constipation, diarrhea, nausea, rectal pain and vomiting.   Endocrine: Negative for cold intolerance, heat intolerance, polydipsia, polyphagia and polyuria.   Genitourinary:   "Negative for decreased urine volume, difficulty urinating, dysuria, enuresis, flank pain, frequency, genital sores, hematuria and urgency.   Musculoskeletal:  Positive for arthralgias and back pain. Negative for gait problem, joint swelling, myalgias, neck pain and neck stiffness.   Skin:  Negative for color change, pallor, rash and wound.   Allergic/Immunologic: Negative for environmental allergies, food allergies and immunocompromised state.   Neurological:  Negative for dizziness, tremors, seizures, syncope, facial asymmetry, speech difficulty, weakness, light-headedness, numbness and headaches.   Hematological:  Negative for adenopathy. Does not bruise/bleed easily.   Psychiatric/Behavioral:  Negative for agitation, behavioral problems, confusion, decreased concentration, dysphoric mood, hallucinations, self-injury, sleep disturbance and suicidal ideas. The patient is not nervous/anxious and is not hyperactive.    All other systems reviewed and are negative.        The patient's past medical history, past surgical history, family history, and social history have been reviewed at length in the electronic medical record.     Past Medical History:   Diagnosis Date    Anemia     Arthritis, rheumatoid     Back pain     Cervical spondylosis     CHF (congestive heart failure)     Degenerative arthritis of lumbar spine     Dupuytrens contracture     High risk medication use     History of aortic valve replacement     Renal insufficiency     Seropositive rheumatoid arthritis     Vision loss of left eye     Vitamin D deficiency      Past Surgical History:   Procedure Laterality Date    CARDIAC VALVE SURGERY      EPIDURAL STIMULATOR INSERTION  12/2011    Thoracic epidural stimulator- Dr. Clark    EPIDURAL STIMULATOR INSERTION  06/2012    \"Lead flipped\"- Unit was replaced, complicated by MRSA infection and removed thereafter.    OTHER SURGICAL HISTORY  03/06/2014    Epidural stimulator wound I&D- Dr. Castro    SHOULDER " "SURGERY       Family History   Problem Relation Age of Onset    Arthritis Father      Social History     Socioeconomic History    Marital status:    Tobacco Use    Smoking status: Never     Passive exposure: Never    Smokeless tobacco: Never   Vaping Use    Vaping status: Never Used   Substance and Sexual Activity    Alcohol use: No    Drug use: No    Sexual activity: Defer           Allergies   Allergen Reactions    Gabapentin Other (See Comments)     \"Out of body experience\"    Hydroxychloroquine Sulfate Itching    Penicillins Rash    Vancomycin Hives       Current Outpatient Medications on File Prior to Visit   Medication Sig Dispense Refill    abatacept (Orencia) 250 MG injection 30 mL Every 28 (Twenty-Eight) Days.      buPROPion XL (WELLBUTRIN XL) 150 MG 24 hr tablet Take 1 tablet by mouth Daily.      citalopram (CeleXA) 20 MG tablet       denosumab (Prolia) 60 MG/ML solution prefilled syringe syringe Inject 1 mL under the skin into the appropriate area as directed Every 6 (Six) Months.      DULoxetine (CYMBALTA) 30 MG capsule Take 1 capsule by mouth Daily. 90 capsule 1    DULoxetine (CYMBALTA) 60 MG capsule Take 1 capsule by mouth Daily. 90 capsule 1    famotidine (PEPCID) 20 MG tablet       folic acid (FOLVITE) 1 MG tablet Take 1 tablet by mouth Every Night. 90 tablet 1    furosemide (LASIX) 80 MG tablet Take 1 tablet by mouth Daily.      HYDROcodone-acetaminophen (NORCO)  MG per tablet Take 1 tablet by mouth 2 (Two) Times a Day As Needed for Moderate Pain.      methotrexate 2.5 MG tablet Take 7 tablets by mouth 1 (One) Time Per Week. On Sundays. 84 tablet 0    simvastatin (ZOCOR) 20 MG tablet       spironolactone (ALDACTONE) 25 MG tablet Take 1 tablet by mouth Daily.      traZODone (DESYREL) 100 MG tablet Take 1.5 tablets by mouth Every Night.       No current facility-administered medications on file prior to visit.          Physical Exam:   Resp 17   Ht 152.4 cm (60\")   Wt 61.7 kg (136 lb)  "  BMI 26.56 kg/m²   CONSTITUTIONAL: Patient is well-nourished, pleasant and appears stated age.  MUSCULOSKELETAL:  Straight leg raising is negative.  Branden's Sign is negative.  ROM in the low back is limited in all directions.  Tenderness in the back to palpation is not observed.  NEUROLOGICAL:  Orientation, memory, attention span, language function, and cognition have been examined and are intact.  Strength is intact in the lower extremities to direct testing.  Muscle tone is normal throughout.  Station and gait are somewhat looped.  Sensation is intact to light touch testing throughout.  Deep tendon reflexes are difficult to elicit throughout.  Coordination is intact.        Medical Decision Making     Data Review:   (All imaging studies were personally reviewed unless stated otherwise)  MRI of the lumbar spine dated 11/7/2024 demonstrate diffuse degenerative disc disease and facet arthropathy.  There could be a trace offset of L4 and L5.  There is mild recess narrowing on the right at L1-2.  There is severe central stenosis with nerve root redundancy at L2-3, L3-4, and L4-5.     Diagnosis:   1.  Lumbar stenosis with neurogenic claudication and radiculopathy.  2.  Chronic mechanical low back pain.     Treatment Options:   The patient is in agony.  I have recommended decompressive laminectomies from L2-5.  The goal would be to improve her radicular symptoms as well as her walking and standing intolerance.  This is not a cure all for her longstanding mechanical low back pain.  The nature of the procedure as well as the potential risks, complications, limitations, and alternatives to the procedure were discussed at length with the patient and the patient has agreed to proceed with surgery.  She will need to come off of her methotrexate 1-2 weeks before her surgery.  Formal cardiac clearance will be necessary.    Electronically signed by Eliezer Cavazos MD at 01/21/25 3970       Current Facility-Administered  Medications   Medication Dose Route Frequency Provider Last Rate Last Admin    acetaminophen (TYLENOL) tablet 650 mg  650 mg Oral Q4H PRN Eliezer Cavazos MD        Or    acetaminophen (TYLENOL) 160 MG/5ML oral solution 650 mg  650 mg Oral Q4H PRN Eliezer Cavazos MD        Or    acetaminophen (TYLENOL) suppository 650 mg  650 mg Rectal Q4H PRN Eliezer Cavazos MD        atorvastatin (LIPITOR) tablet 10 mg  10 mg Oral Daily Eliezer Cavazos MD        sennosides-docusate (PERICOLACE) 8.6-50 MG per tablet 2 tablet  2 tablet Oral BID Eliezer Cavazos MD        And    polyethylene glycol (MIRALAX) packet 17 g  17 g Oral Daily PRN Eliezer Cavazos MD        And    bisacodyl (DULCOLAX) EC tablet 5 mg  5 mg Oral Daily PRN Eliezer Cavazos MD        And    bisacodyl (DULCOLAX) suppository 10 mg  10 mg Rectal Daily PRN Eliezer Cavazos MD        buPROPion XL (WELLBUTRIN XL) 24 hr tablet 150 mg  150 mg Oral Daily Eliezer Cavazos MD        diphenhydrAMINE (BENADRYL) capsule 25 mg  25 mg Oral Nightly PRN Eliezer Cavazos MD        DULoxetine (CYMBALTA) DR capsule 60 mg  60 mg Oral Daily Eliezer Cavazos MD        famotidine (PEPCID) tablet 20 mg  20 mg Oral Daily Eliezer Cavazos MD        fentaNYL citrate (PF) (SUBLIMAZE) injection 50 mcg  50 mcg Intravenous Q5 Min PRN Jf Thakkar MD        folic acid (FOLVITE) tablet 1 mg  1 mg Oral Nightly Eliezer Cavazos MD        furosemide (LASIX) tablet 80 mg  80 mg Oral Daily Eliezer Cavazos MD        HYDROcodone-acetaminophen (NORCO) 5-325 MG per tablet 1 tablet  1 tablet Oral Q4H PRN Eliezer Cavazos MD        HYDROmorphone (DILAUDID) injection 0.5 mg  0.5 mg Intravenous Q5 Min PRN Jf Thakkar MD        lactated ringers bolus 500 mL  500 mL Intravenous Once PRN Jf Thakkar MD        [START ON 2/4/2025] lactated ringers infusion  9 mL/hr Intravenous Continuous Jf Thakkar MD   New Bag at 02/03/25 0954    lactated ringers infusion  75 mL/hr Intravenous  Continuous Eliezer Cavazos MD        morphine injection 1 mg  1 mg Intravenous Q2H PRN Eliezer Cavazos MD        And    naloxone (NARCAN) injection 0.4 mg  0.4 mg Intravenous Q5 Min PRN Eliezer Cavazos MD        ondansetron (ZOFRAN) injection 4 mg  4 mg Intravenous Once PRN Jf Thakkar MD        ondansetron ODT (ZOFRAN-ODT) disintegrating tablet 4 mg  4 mg Oral Q6H PRN Eliezer Cavazos MD        Or    ondansetron (ZOFRAN) injection 4 mg  4 mg Intravenous Q6H PRN Eliezer Cavazos MD        oxyCODONE-acetaminophen (PERCOCET) 7.5-325 MG per tablet 1 tablet  1 tablet Oral Q4H PRN Eliezer Cavazos MD        promethazine (PHENERGAN) tablet 12.5 mg  12.5 mg Oral Q6H PRN Eliezer Cavazos MD        Or    promethazine (PHENERGAN) suppository 12.5 mg  12.5 mg Rectal Q6H PRN Eliezer Cavazos MD        sodium chloride 0.9 % flush 10 mL  10 mL Intravenous PRN Eliezer Cavazos MD        sodium chloride 0.9 % flush 3 mL  3 mL Intravenous Q12H Eliezer Cavazos MD        sodium chloride 0.9 % infusion 40 mL  40 mL Intravenous PRN Eliezer Cavazos MD        spironolactone (ALDACTONE) tablet 25 mg  25 mg Oral Daily Eliezer Cavazos MD        traZODone (DESYREL) tablet 150 mg  150 mg Oral Nightly Eliezer Cavazos MD         Lab Results (all)       Procedure Component Value Units Date/Time    Basic Metabolic Panel [[id number]]  (Abnormal) Collected: 02/03/25 0848    Specimen: Blood Updated: 02/03/25 0914     Glucose 94 mg/dL      BUN 35 mg/dL      Creatinine 1.36 mg/dL      Sodium 137 mmol/L      Potassium 4.1 mmol/L      Chloride 99 mmol/L      CO2 26.0 mmol/L      Calcium 9.3 mg/dL      BUN/Creatinine Ratio 25.7     Anion Gap 12.0 mmol/L      eGFR 39.7 mL/min/1.73     Narrative:      GFR Categories in Chronic Kidney Disease (CKD)      GFR Category          GFR (mL/min/1.73)    Interpretation  G1                     90 or greater         Normal or high (1)  G2                      60-89                Mild decrease  (1)  G3a                   45-59                Mild to moderate decrease  G3b                   30-44                Moderate to severe decrease  G4                    15-29                Severe decrease  G5                    14 or less           Kidney failure          (1)In the absence of evidence of kidney disease, neither GFR category G1 or G2 fulfill the criteria for CKD.    eGFR calculation 2021 CKD-EPI creatinine equation, which does not include race as a factor          Imaging Results (All)       Procedure Component Value Units Date/Time    FL C Arm During Surgery [[id number]] Resulted: 02/03/25 1102     Updated: 02/03/25 1102    Narrative:      This procedure was auto-finalized with no dictation required.             Operative/Procedure Notes (all)        Eliezer Cavazos MD at 02/03/25 1016  Version 2 of 2         NEUROSURGICAL OPERATIVE NOTE        PREOPERATIVE DIAGNOSIS:    Lumbar stenosis with neurogenic claudication      POSTOPERATIVE DIAGNOSIS:  Same      PROCEDURE:  L2-3, L3-4, and L4-5 laminectomies with medial facetectomies and foraminotomies      SURGEON:  Eliezer Cavazos M.D.      ASSISTANT: Nerissa Graham PA-C    PAC assisted with:   Suctioning   Retraction   Tying   Suturing   Closing   Application of dressing   Skilled neurosurgery PA assistance was necessary to perform this procedure.        ANESTHESIA:  General      ESTIMATED BLOOD LOSS:  150ml      SPECIMEN: None      DRAINS: 7 flat CARLA      COMPLICATIONS: Dural rent      CLINICAL NOTE:  The patient is a 79-year-old woman with progressive back and lower extremity pain with walking and standing intolerance.  Studies demonstrate multilevel high-grade lumbar stenosis and as such she presents at this time for lumbar decompression.  The nature of the procedure as well as the potential risks, complications, limitations, and alternatives to the procedure were discussed at length with the patient and the patient has agreed to proceed with  surgery.      TECHNICAL NOTE:  The patient was brought to the operating room and while on her cart general endotracheal anesthesia was achieved. She was then turned prone onto the Cloward saddle frame. Special care was ensured to protect pressure points. Her low back was prepared and draped in the usual fashion. A localizing radiograph was obtained with a spinal needle in the lumbosacral midline. Based on this a several centimeter vertical incision was fashioned. Underlying tissues were divided with cautery to provide exposure to the posterior spinal elements from L2 to L5. Another radiograph confirmed the operative levels. Leksell rongeur was then utilized to remove the spinous processes at L2, L3, L4 and the upper aspect of L5. Central trough was fashioned using drill and Kerrison punches. There was markedly overgrown bone and ligament. Thecal sac was dramatically compressed. After fashioning a central trough the facet complexes were undercut using drill and Kerrison punches. There was particular bony ridging across the disc spaces, however, the discs were not violated. A small punctate dural opening occurred on the left at L4-5 in dissecting the osteophytes off of the dura. A single 6-0 Prolene suture was placed and that sealed this over. Good decompression of the thecal sac was accomplished. The nerve roots were well decompressed at each level on each side. Bleeding points were controlled with bone wax and Floseal. With a Valsalva maneuver at the end of the procedure there was no significant bleeding or evidence of CSF leak. The small durotomy repair site was covered with layers of DuraGen. Adherus sealant was placed over that. A 7 flat CARLA drain was brought in through a separate stab incision and left in the epidural space. The paraspinous muscle and fascia were reapproximated in interrupted fashion with #0 Vicryl suture. Then 0.25% Marcaine was instilled in the paraspinous musculature and subcutaneous tissue.  The subcutaneous tissues were closed in layers with 2-0 followed by 3-0 Vicryl suture. The skin was closed in a running locked fashion with 3-0 nylon suture. Sterile dressing was applied. The patient was rolled onto her cart, extubated, and taken to the recovery room in satisfactory condition.            Eliezer Cavazos M.D.      Electronically signed by Eliezer Cavazos MD at 02/03/25 1232       Eliezer Cavazos MD at 02/03/25 1016  Version 1 of 2         NEUROSURGICAL OPERATIVE NOTE        PREOPERATIVE DIAGNOSIS:    Lumbar stenosis with neurogenic claudication      POSTOPERATIVE DIAGNOSIS:  Same      PROCEDURE:  L2-3, L3-4, and L4-5 laminectomies with medial facetectomies and foraminotomies      SURGEON:  Eliezer Cavazos M.D.      ASSISTANT: Nerissa Graham PA-C    PAC assisted with:   Suctioning   Retraction   Tying   Suturing   Closing   Application of dressing   Skilled neurosurgery PA assistance was necessary to perform this procedure.        ANESTHESIA:  General      ESTIMATED BLOOD LOSS:  150ml      SPECIMEN: None      DRAINS: 7 flat CARLA      COMPLICATIONS: Dural rent      CLINICAL NOTE:  The patient is a 79-year-old woman with progressive back and lower extremity pain with walking and standing intolerance.  Studies demonstrate multilevel high-grade lumbar stenosis and as such she presents at this time for lumbar decompression.  The nature of the procedure as well as the potential risks, complications, limitations, and alternatives to the procedure were discussed at length with the patient and the patient has agreed to proceed with surgery.      TECHNICAL NOTE:   Dictation on: 02/03/2025 12:06 PM by: ELIEZER CAVAZOS [[id number]]           Eliezer Cavazos M.D.      Electronically signed by Eliezer Cavazos MD at 02/03/25 6230

## 2025-02-03 NOTE — OP NOTE
NEUROSURGICAL OPERATIVE NOTE        PREOPERATIVE DIAGNOSIS:    Lumbar stenosis with neurogenic claudication      POSTOPERATIVE DIAGNOSIS:  Same      PROCEDURE:  L2-3, L3-4, and L4-5 laminectomies with medial facetectomies and foraminotomies      SURGEON:  Eliezer Cavazos M.D.      ASSISTANT: Nerissa Graham PA-C    PAC assisted with:   Suctioning   Retraction   Tying   Suturing   Closing   Application of dressing   Skilled neurosurgery PA assistance was necessary to perform this procedure.        ANESTHESIA:  General      ESTIMATED BLOOD LOSS:  150ml      SPECIMEN: None      DRAINS: 7 flat CARLA      COMPLICATIONS: Dural rent      CLINICAL NOTE:  The patient is a 79-year-old woman with progressive back and lower extremity pain with walking and standing intolerance.  Studies demonstrate multilevel high-grade lumbar stenosis and as such she presents at this time for lumbar decompression.  The nature of the procedure as well as the potential risks, complications, limitations, and alternatives to the procedure were discussed at length with the patient and the patient has agreed to proceed with surgery.      TECHNICAL NOTE:  The patient was brought to the operating room and while on her cart general endotracheal anesthesia was achieved. She was then turned prone onto the Cloward saddle frame. Special care was ensured to protect pressure points. Her low back was prepared and draped in the usual fashion. A localizing radiograph was obtained with a spinal needle in the lumbosacral midline. Based on this a several centimeter vertical incision was fashioned. Underlying tissues were divided with cautery to provide exposure to the posterior spinal elements from L2 to L5. Another radiograph confirmed the operative levels. Leksell rongeur was then utilized to remove the spinous processes at L2, L3, L4 and the upper aspect of L5. Central trough was fashioned using drill and Kerrison punches. There was markedly overgrown bone and  ligament. Thecal sac was dramatically compressed. After fashioning a central trough the facet complexes were undercut using drill and Kerrison punches. There was particular bony ridging across the disc spaces, however, the discs were not violated. A small punctate dural opening occurred on the left at L4-5 in dissecting the osteophytes off of the dura. A single 6-0 Prolene suture was placed and that sealed this over. Good decompression of the thecal sac was accomplished. The nerve roots were well decompressed at each level on each side. Bleeding points were controlled with bone wax and Floseal. With a Valsalva maneuver at the end of the procedure there was no significant bleeding or evidence of CSF leak. The small durotomy repair site was covered with layers of DuraGen. Adherus sealant was placed over that. A 7 flat CARLA drain was brought in through a separate stab incision and left in the epidural space. The paraspinous muscle and fascia were reapproximated in interrupted fashion with #0 Vicryl suture. Then 0.25% Marcaine was instilled in the paraspinous musculature and subcutaneous tissue. The subcutaneous tissues were closed in layers with 2-0 followed by 3-0 Vicryl suture. The skin was closed in a running locked fashion with 3-0 nylon suture. Sterile dressing was applied. The patient was rolled onto her cart, extubated, and taken to the recovery room in satisfactory condition.            Eliezer Cavazos M.D.

## 2025-02-03 NOTE — ANESTHESIA PROCEDURE NOTES
Airway  Urgency: elective    Date/Time: 2/3/2025 10:02 AM  Airway not difficult    General Information and Staff    Patient location during procedure: OR    Indications and Patient Condition  Indications for airway management: airway protection    Preoxygenated: yes  MILS not maintained throughout  Mask difficulty assessment: 2 - vent by mask + OA or adjuvant +/- NMBA    Final Airway Details  Final airway type: endotracheal airway      Successful airway: ETT  Cuffed: yes   Successful intubation technique: video laryngoscopy  Facilitating devices/methods: intubating stylet  Endotracheal tube insertion site: oral  Blade: Sequeira  Blade size: 3  ETT size (mm): 7.0  Cormack-Lehane Classification: grade I - full view of glottis  Placement verified by: chest auscultation and capnometry   Measured from: lips  ETT/EBT  to lips (cm): 20  Number of attempts at approach: 1  Assessment: lips, teeth, and gum same as pre-op and atraumatic intubation    Additional Comments  Negative epigastric sounds, Breath sound equal bilaterally with symmetric chest rise and fall

## 2025-02-03 NOTE — ANESTHESIA PREPROCEDURE EVALUATION
Anesthesia Evaluation     Patient summary reviewed and Nursing notes reviewed   no history of anesthetic complications:   NPO Solid Status: > 8 hours  NPO Liquid Status: > 8 hours           Airway   Mallampati: II  TM distance: >3 FB  Neck ROM: full  No difficulty expected  Dental      Pulmonary - negative pulmonary ROS and normal exam   Cardiovascular - normal exam    (+) hypertension, CHF       Neuro/Psych  (+) numbness  GI/Hepatic/Renal/Endo    (+) renal disease-    Musculoskeletal     (+) back pain  Abdominal    Substance History      OB/GYN          Other   arthritis,                 Anesthesia Plan    ASA 2     general     intravenous induction     Anesthetic plan, risks, benefits, and alternatives have been provided, discussed and informed consent has been obtained with: patient.    Plan discussed with CRNA.    CODE STATUS:

## 2025-02-03 NOTE — PLAN OF CARE
Patient arrived from PACU at 1648.  Educated of HOB flat.  CARLA in place with 20ml out.  Larsen intact with clear urine returned.  Dressing has small drainage.  Left corneal abrasion addressed in PACU with patch in place.  Patient has no family at bedside.  VSS.

## 2025-02-03 NOTE — ANESTHESIA POSTPROCEDURE EVALUATION
Patient: Stacia Adkins    Procedure Summary       Date: 02/03/25 Room / Location:  [facility]  /  [facility]    Anesthesia Start: 0954 Anesthesia Stop: 1224    Procedure: Lumbar laminectomy L2-5, DURAL REPAIR (Spine Lumbar) Diagnosis:       Lumbar stenosis with neurogenic claudication      (Lumbar stenosis with neurogenic claudication [M48.062])    Surgeons: Eliezer Cavazos MD Provider: Erik Sellers MD    Anesthesia Type: general ASA Status: 2            Anesthesia Type: general    Vitals  Vitals Value Taken Time   /90 02/03/25 1224   Temp 98 °F (36.7 °C) 02/03/25 1224   Pulse 101 02/03/25 1224   Resp 14 02/03/25 1224   SpO2 99 % 02/03/25 1224           Post Anesthesia Care and Evaluation    Patient location during evaluation: PACU  Patient participation: complete - patient participated  Level of consciousness: sleepy but conscious  Pain management: adequate    Airway patency: patent  Anesthetic complications: No anesthetic complications  PONV Status: none  Cardiovascular status: hemodynamically stable and acceptable  Respiratory status: nonlabored ventilation, acceptable and nasal cannula  Hydration status: acceptable

## 2025-02-03 NOTE — INTERVAL H&P NOTE
[facility] Pre-op    Full history and physical note from office is attached.    +cardiac clearance     VS: /80  HR 84  RR 16  T 98.0  Sat 96%RA    Immunizations:  Influenza:  UTD  Pneumococcal:  No  Tetanus:  UTD    Review of Systems:  Constitutional-- No fever, chills or sweats. No fatigue.  CV-- No chest pain, palpitation or syncope  Resp-- No SOB, cough, hemoptysis  Skin--Left scalp abrasion     Physical Exam:  Heart:   Regular rate and rhythm, S1 and S2 normal  Lungs: Clear to auscultation bilaterally, respirations unlabored    LAB Results:  Lab Results   Component Value Date    WBC 14.77 (H) 01/27/2025    HGB 13.4 01/27/2025    HCT 39.7 01/27/2025    MCV 91.9 01/27/2025     01/27/2025    NEUTROABS 9.59 (H) 01/27/2025    GLUCOSE 95 01/27/2025    BUN 28 (H) 01/27/2025    CREATININE 1.33 (H) 01/27/2025    EGFRIFNONA 67 12/15/2018     01/27/2025    K 5.1 01/27/2025    CL 98 01/27/2025    CO2 27.5 01/27/2025    MG 1.8 04/05/2016    PHOS 5.2 (H) 04/05/2016    CALCIUM 9.7 01/27/2025    ALBUMIN 4.0 01/27/2025    AST 25 01/27/2025    ALT 10 01/27/2025    BILITOT 0.3 01/27/2025    PTT 23 (L) 04/03/2016    INR 0.96 04/03/2016       Study Result    Narrative & Impression      MRI LUMBAR SPINE WO CONTRAST     Date of Exam: 11/7/2024 12:27 PM EST     Indication: M48.062.     Comparison: None available.     Technique:  Routine multiplanar/multisequence sequence images of the lumbar spine were obtained without contrast administration.       Findings:   T1 marrow signal is preserved, without evidence of fracture or suspicious marrow replacing lesion. There is mild mid lumbar levocurvature present in addition to minimal retrolisthesis of L3 on L4. The conus medullaris appears normal. The cauda equina   nerve roots demonstrate some redundancy secondary to areas of severe spinal canal stenosis. The paraspinal soft tissues demonstrate no acute or suspicious findings. Multilevel spondylosis is  present, with areas of involvement including     L1-2, bilateral facet arthropathy is present with associated mild to moderate right neuroforaminal narrowing. The spinal canal is patent.     L2-3, circumferential disc bulge with ligamentum flavum thickening and prominent right-sided facet arthropathy. There is moderate to severe spinal canal narrowing in addition to moderate to severe right neuroforaminal stenosis.     L3-4, circumferential disc bulge with prominent ligamentum flavum thickening and bilateral facet arthropathy. There is severe spinal canal narrowing with effacement of the thecal sac and some central crowding of the cauda equina nerve roots which   demonstrate associated redundancy. There is also moderate to severe bilateral neuroforaminal narrowing, fairly symmetric.     L4-5, circumferential disc bulge with prominent ligamentum flavum thickening and bilateral facet arthropathy. There is severe spinal canal stenosis as well as severe left and mild to moderate right neuroforaminal narrowing.     L5-S1, circumferential disc bulge with component of left-sided disc protrusion in addition to bilateral facet arthropathy. There is mild spinal canal narrowing as well as moderate to severe narrowing of the lateral aspect of the left neural foramen and   mild right neuroforaminal stenosis.     Impression:   Advanced multilevel lumbar spondylosis is present as above, with contributing component of mid lumbar levocurvature. The L3-4 and L4-5 levels demonstrate severe spinal canal narrowing, including effacement of the thecal sac with some crowding of the   cauda equina nerve roots. There is also multilevel advanced neuroforaminal stenosis.     Cancer Staging (if applicable)  Cancer Patient: __ yes __no __unknown__N/A; If yes, clinical stage T:__ N:__M:__, stage group or __N/A      Impression: Lumbar stenosis with neurogenic claudication       Plan: Lumbar laminectomy L2-5       Nani Mondragon, BRIANA   2/3/2025    08:29 EST

## 2025-02-04 PROCEDURE — 25010000002 HEPARIN (PORCINE) PER 1000 UNITS: Performed by: NEUROLOGICAL SURGERY

## 2025-02-04 PROCEDURE — 63710000001 PROMETHAZINE PER 12.5 MG: Performed by: NEUROLOGICAL SURGERY

## 2025-02-04 PROCEDURE — 99024 POSTOP FOLLOW-UP VISIT: CPT | Performed by: NEUROLOGICAL SURGERY

## 2025-02-04 PROCEDURE — 25810000003 LACTATED RINGERS PER 1000 ML: Performed by: ANESTHESIOLOGY

## 2025-02-04 RX ORDER — HEPARIN SODIUM 5000 [USP'U]/ML
5000 INJECTION, SOLUTION INTRAVENOUS; SUBCUTANEOUS EVERY 8 HOURS SCHEDULED
Status: DISCONTINUED | OUTPATIENT
Start: 2025-02-04 | End: 2025-02-06 | Stop reason: HOSPADM

## 2025-02-04 RX ADMIN — SENNOSIDES AND DOCUSATE SODIUM 2 TABLET: 50; 8.6 TABLET ORAL at 20:14

## 2025-02-04 RX ADMIN — HYDROCODONE BITARTRATE AND ACETAMINOPHEN 1 TABLET: 5; 325 TABLET ORAL at 12:33

## 2025-02-04 RX ADMIN — HEPARIN SODIUM 5000 UNITS: 5000 INJECTION INTRAVENOUS; SUBCUTANEOUS at 21:48

## 2025-02-04 RX ADMIN — TRAZODONE HYDROCHLORIDE 150 MG: 50 TABLET ORAL at 20:14

## 2025-02-04 RX ADMIN — HEPARIN SODIUM 5000 UNITS: 5000 INJECTION INTRAVENOUS; SUBCUTANEOUS at 14:39

## 2025-02-04 RX ADMIN — HEPARIN SODIUM 5000 UNITS: 5000 INJECTION INTRAVENOUS; SUBCUTANEOUS at 08:49

## 2025-02-04 RX ADMIN — HYDROCODONE BITARTRATE AND ACETAMINOPHEN 1 TABLET: 5; 325 TABLET ORAL at 00:28

## 2025-02-04 RX ADMIN — DULOXETINE HYDROCHLORIDE 60 MG: 60 CAPSULE, DELAYED RELEASE ORAL at 08:48

## 2025-02-04 RX ADMIN — SODIUM CHLORIDE, POTASSIUM CHLORIDE, SODIUM LACTATE AND CALCIUM CHLORIDE 9 ML/HR: 600; 310; 30; 20 INJECTION, SOLUTION INTRAVENOUS at 07:25

## 2025-02-04 RX ADMIN — ATORVASTATIN CALCIUM 10 MG: 10 TABLET, FILM COATED ORAL at 08:48

## 2025-02-04 RX ADMIN — FOLIC ACID 1 MG: 1 TABLET ORAL at 20:14

## 2025-02-04 RX ADMIN — PROMETHAZINE HYDROCHLORIDE 12.5 MG: 12.5 TABLET ORAL at 21:47

## 2025-02-04 RX ADMIN — BUPROPION HYDROCHLORIDE 150 MG: 150 TABLET, EXTENDED RELEASE ORAL at 08:48

## 2025-02-04 RX ADMIN — SENNOSIDES AND DOCUSATE SODIUM 2 TABLET: 50; 8.6 TABLET ORAL at 08:48

## 2025-02-04 RX ADMIN — ACETAMINOPHEN 650 MG: 325 TABLET ORAL at 08:48

## 2025-02-04 RX ADMIN — HYDROCODONE BITARTRATE AND ACETAMINOPHEN 1 TABLET: 5; 325 TABLET ORAL at 20:14

## 2025-02-04 NOTE — PROGRESS NOTES
"NEUROSURGERY PROGRESS NOTE     LOS: 1 day   Patient Care Team:  Savanna Juarez MD as PCP - General (Internal Medicine)  Kirk Deutsch MD as Consulting Physician (Rheumatology)  Arley Shaw APRN as Nurse Practitioner (Rheumatology)  Eliezer Cavazos MD as Surgeon (Neurosurgery)    Chief Complaint: Low back and lower extremity pain with walking and standing intolerance.    POD#: 1 Day Post-Op  Procedures:  L2-5 laminectomies.    Interval History:   Patient Complaints: None.  Patient Denies: Headache.    Vital Signs: Blood pressure 104/70, pulse 89, temperature 98.6 °F (37 °C), temperature source Oral, resp. rate 16, height 152.4 cm (60\"), weight 61.7 kg (136 lb), SpO2 96%.  Intake/Output:   Intake/Output Summary (Last 24 hours) at 2/4/2025 0602  Last data filed at 2/3/2025 1813  Gross per 24 hour   Intake 800 ml   Output 1080 ml   Net -280 ml     Drain output: 130 mL.    Physical Exam:  Patient is awake and alert.  She is pleasant and cooperative.  She is resting comfortably on her side.  Mild heme staining is noted on her dressing.     Assessment/Plan:  1.  Lumbar stenosis with neurogenic claudication status post multilevel decompression.  2.  Intraoperative dural rent.  3.  Mechanical low back pain.  4.  Rheumatoid arthritis.  5.  Congestive heart failure.  6.  Fibromyalgia.  7.  Disposition: Continue bedrest.  Mobilize later today or tomorrow if drain output is limited.      Eliezer Cavazos MD  02/04/25  06:02 EST    "

## 2025-02-04 NOTE — PLAN OF CARE
Goal Outcome Evaluation:              Problem: Adult Inpatient Plan of Care  Goal: Absence of Hospital-Acquired Illness or Injury  Outcome: Progressing  Intervention: Identify and Manage Fall Risk  Recent Flowsheet Documentation  Taken 2/4/2025 0200 by Eladia Avalos RN  Safety Promotion/Fall Prevention:   fall prevention program maintained   safety round/check completed  Taken 2/4/2025 0000 by Eladia Avalos RN  Safety Promotion/Fall Prevention:   activity supervised   safety round/check completed   room organization consistent  Taken 2/3/2025 2200 by Eladia Avalos RN  Safety Promotion/Fall Prevention:   room organization consistent   safety round/check completed  Taken 2/3/2025 2000 by Eladia Avalos RN  Safety Promotion/Fall Prevention:   activity supervised   safety round/check completed  Intervention: Prevent Skin Injury  Recent Flowsheet Documentation  Taken 2/4/2025 0200 by Eladia Avalos RN  Body Position:   position changed independently   position maintained  Skin Protection: incontinence pads utilized  Taken 2/4/2025 0028 by Eladia Avalos RN  Body Position: head facing, left  Taken 2/4/2025 0000 by Eladia Avalos RN  Body Position: head facing, right  Skin Protection: incontinence pads utilized  Taken 2/3/2025 2200 by Eladia Avalos RN  Body Position:   head facing, left   position changed independently  Skin Protection: incontinence pads utilized  Taken 2/3/2025 2000 by Eladia Avalos RN  Body Position: position changed independently  Skin Protection: incontinence pads utilized  Intervention: Prevent and Manage VTE (Venous Thromboembolism) Risk  Recent Flowsheet Documentation  Taken 2/4/2025 0200 by Eladia Avalos RN  VTE Prevention/Management:   SCDs (sequential compression devices) on   bilateral  Taken 2/4/2025 0000 by Eladia Avalos RN  VTE Prevention/Management:   bilateral   SCDs (sequential compression devices) on  Taken 2/3/2025 2000 by Eladia Avalos RN  VTE  Prevention/Management:   SCDs (sequential compression devices) on   bilateral  Goal: Optimal Comfort and Wellbeing  Outcome: Progressing  Intervention: Monitor Pain and Promote Comfort  Recent Flowsheet Documentation  Taken 2/3/2025 2058 by Eladia Avalos, RN  Pain Management Interventions:   care clustered   pain medication given  Taken 2/3/2025 2000 by Eladia Avalos, RN  Pain Management Interventions:   medication offered but refused   pain management plan reviewed with patient/caregiver  Intervention: Provide Person-Centered Care  Recent Flowsheet Documentation  Taken 2/3/2025 2000 by Eladia Avalos RN  Trust Relationship/Rapport:   care explained   choices provided   questions answered  Goal: Readiness for Transition of Care  Outcome: Progressing

## 2025-02-04 NOTE — CASE MANAGEMENT/SOCIAL WORK
Continued Stay Note  [facility]     Patient Name: Stacia Adkins  MRN: [id number]  Today's Date: 2/4/2025    Admit Date: 2/3/2025    Plan: Home   Discharge Plan       Row Name 02/04/25 1442       Plan    Plan Home    Patient/Family in Agreement with Plan yes    Plan Comments Met with Ms. Adkins, at the bedside, for discharge planning.    Ms. Adkins lives alone, in [facility].    She is currently on bedrest with HOB flat.  She will be evaluated by PT, once she is off bedrest.    The patient states that prior to admission, she ambulated independently with no AD.  She states she has never been to IPR or used home health.  She has used outpatient PT.   PCP is Savanna Juarez.  Insurance is [employer].  No ACP documents.    DC plan is to return home with neighbors assistance.  The patient does have one daughter that lives locally that may assist also.  CM will follow up.    Final Discharge Disposition Code 01 - home or self-care                    Deepali Mauro RN

## 2025-02-05 PROCEDURE — 97162 PT EVAL MOD COMPLEX 30 MIN: CPT

## 2025-02-05 PROCEDURE — 99024 POSTOP FOLLOW-UP VISIT: CPT | Performed by: NEUROLOGICAL SURGERY

## 2025-02-05 PROCEDURE — 25010000002 HEPARIN (PORCINE) PER 1000 UNITS: Performed by: NEUROLOGICAL SURGERY

## 2025-02-05 RX ADMIN — ATORVASTATIN CALCIUM 10 MG: 10 TABLET, FILM COATED ORAL at 08:09

## 2025-02-05 RX ADMIN — HYDROCODONE BITARTRATE AND ACETAMINOPHEN 1 TABLET: 5; 325 TABLET ORAL at 11:36

## 2025-02-05 RX ADMIN — FUROSEMIDE 80 MG: 80 TABLET ORAL at 08:09

## 2025-02-05 RX ADMIN — SENNOSIDES AND DOCUSATE SODIUM 2 TABLET: 50; 8.6 TABLET ORAL at 20:46

## 2025-02-05 RX ADMIN — BUPROPION HYDROCHLORIDE 150 MG: 150 TABLET, EXTENDED RELEASE ORAL at 08:09

## 2025-02-05 RX ADMIN — HYDROCODONE BITARTRATE AND ACETAMINOPHEN 1 TABLET: 5; 325 TABLET ORAL at 05:32

## 2025-02-05 RX ADMIN — HEPARIN SODIUM 5000 UNITS: 5000 INJECTION INTRAVENOUS; SUBCUTANEOUS at 20:46

## 2025-02-05 RX ADMIN — SENNOSIDES AND DOCUSATE SODIUM 2 TABLET: 50; 8.6 TABLET ORAL at 08:09

## 2025-02-05 RX ADMIN — FOLIC ACID 1 MG: 1 TABLET ORAL at 20:46

## 2025-02-05 RX ADMIN — HEPARIN SODIUM 5000 UNITS: 5000 INJECTION INTRAVENOUS; SUBCUTANEOUS at 05:32

## 2025-02-05 RX ADMIN — DULOXETINE HYDROCHLORIDE 60 MG: 60 CAPSULE, DELAYED RELEASE ORAL at 08:09

## 2025-02-05 RX ADMIN — FAMOTIDINE 20 MG: 20 TABLET, FILM COATED ORAL at 08:09

## 2025-02-05 RX ADMIN — TRAZODONE HYDROCHLORIDE 150 MG: 50 TABLET ORAL at 20:45

## 2025-02-05 RX ADMIN — HYDROCODONE BITARTRATE AND ACETAMINOPHEN 1 TABLET: 5; 325 TABLET ORAL at 18:26

## 2025-02-05 RX ADMIN — HEPARIN SODIUM 5000 UNITS: 5000 INJECTION INTRAVENOUS; SUBCUTANEOUS at 15:04

## 2025-02-05 RX ADMIN — OXYCODONE HYDROCHLORIDE AND ACETAMINOPHEN 1 TABLET: 7.5; 325 TABLET ORAL at 15:03

## 2025-02-05 NOTE — PLAN OF CARE
Patient complained of pain throughout the shift.  PRN medication give.  Nausea noted.  BM after digital disimpaction.  HOB remained flat with positon changes from side to done.  Larsen in place with adequate UOP.  CARLA had 40mls this shift.  No changes in the status of the border dressing.  VSS.  Hopeful for mobilization in am.

## 2025-02-05 NOTE — THERAPY EVALUATION
"Patient Name: Stacia Adkins  : 1945    MRN: [id number]                              Today's Date: 2025       Admit Date: 2/3/2025    Visit Dx:     ICD-10-CM ICD-9-CM   1. Lumbar stenosis with neurogenic claudication  M48.062 724.03     Patient Active Problem List   Diagnosis    Rhabdomyolysis    Rheumatoid arthritis involving multiple sites    Diastolic CHF, chronic    History of aortic valve replacement    Rheumatoid arthritis with rheumatoid factor of multiple sites without organ or systems involvement    History of rhabdomyolysis    Left carpal tunnel syndrome    Primary osteoarthritis of right shoulder    Multilevel degenerative disc disease    Generalized osteoarthrosis, involving multiple sites    Fibromyalgia    Age related osteoporosis    High risk medication use    Immunosuppression due to drug therapy    Lumbar stenosis with neurogenic claudication    S/P lumbar laminectomy     Past Medical History:   Diagnosis Date    Anemia     Arthritis, rheumatoid     Back pain     Cervical spondylosis     CHF (congestive heart failure)     Chronic venous insufficiency of lower extremity     Degenerative arthritis of lumbar spine     Dupuytrens contracture     High risk medication use     History of aortic valve replacement     Hypertension     Osteoporosis     Renal insufficiency     Scalp abrasion     left temporal and parietal region    Seropositive rheumatoid arthritis     Teeth missing     Vision loss of left eye     Vitamin D deficiency     Wears glasses      Past Surgical History:   Procedure Laterality Date    CARDIAC VALVE SURGERY      EPIDURAL STIMULATOR INSERTION  2011    Thoracic epidural stimulator- Dr. Clark    EPIDURAL STIMULATOR INSERTION  2012    \"Lead flipped\"- Unit was replaced, complicated by MRSA infection and removed thereafter.    EPIDURAL STIMULATOR INSERTION      removed    LUMBAR LAMINECTOMY DISCECTOMY DECOMPRESSION N/A 2/3/2025    Procedure: laminectomies with medial " facetectomies and foraminotomies L2-3, L3-4, and L4-5;  Surgeon: Eliezer Cavazos MD;  Location: [facility];  Service: Neurosurgery;  Laterality: N/A;    OTHER SURGICAL HISTORY  03/06/2014    Epidural stimulator wound I&D- Dr. Castro    SHOULDER SURGERY Right       General Information       Row Name 02/05/25 1441          Physical Therapy Time and Intention    Document Type evaluation  -AB     Mode of Treatment physical therapy  -AB       Row Name 02/05/25 1441          General Information    Patient Profile Reviewed yes  -AB     Prior Level of Function independent:;all household mobility;community mobility;gait;transfer;bed mobility;ADL's;driving  Denied falls or AD use.  -AB     Existing Precautions/Restrictions fall;spinal  -AB     Barriers to Rehab medically complex  -AB       Row Name 02/05/25 1441          Living Environment    People in Home alone  -AB       Row Name 02/05/25 1441          Home Main Entrance    Number of Stairs, Main Entrance three  -AB     Stair Railings, Main Entrance railings on both sides of stairs  -AB       Row Name 02/05/25 1441          Stairs Within Home, Primary    Number of Stairs, Within Home, Primary none  -AB       Row Name 02/05/25 1441          Cognition    Orientation Status (Cognition) oriented x 3  -AB       Row Name 02/05/25 1441          Safety Issues/Impairments Affecting Functional Mobility    Safety Issues Affecting Function (Mobility) awareness of need for assistance;insight into deficits/self-awareness;safety precaution awareness;safety precautions follow-through/compliance;sequencing abilities  -AB     Impairments Affecting Function (Mobility) endurance/activity tolerance;pain;strength;balance;sensation/sensory awareness  -AB               User Key  (r) = Recorded By, (t) = Taken By, (c) = Cosigned By      Initials Name Provider Type    AB Cesilia Garcia PT Physical Therapist                   Mobility       Row Name 02/05/25 1447          Bed Mobility    Bed  Mobility rolling left;rolling right;sidelying-sit;sit-sidelying  -AB     Rolling Left [place] (Bed Mobility) standby assist  -AB     Rolling Right [place] (Bed Mobility) standby assist  -AB     Sidelying-Sit [place] (Bed Mobility) contact guard  -AB     Sit-Sidelying [place] (Bed Mobility) 1 person assist;verbal cues;nonverbal cues (demo/gesture);moderate assist (50% patient effort)  -AB     Assistive Device (Bed Mobility) bed rails  -AB     Comment, (Bed Mobility) Education provided on logroll technique. Assist provided for return to supine secondary to pain.  -AB       Row Name 02/05/25 1447          Transfers    Comment, (Transfers) Cues for hand placement and sequencing. Spinal precautions reviewed, pt verbalized understanding.  -AB       Row Name 02/05/25 1447          Bed-Chair Transfer    Comment, (Bed-Chair Transfer) deferred sitting UIC d/t pain.  -AB       Row Name 02/05/25 1447          Sit-Stand Transfer    Sit-Stand [place] (Transfers) contact guard;2 person assist;verbal cues  -AB     Assistive Device (Sit-Stand Transfers) walker, front-wheeled  -AB     Comment, (Sit-Stand Transfer) Cues for upright posture.  -AB       Row Name 02/05/25 1447          Gait/Stairs (Locomotion)    [place] Level (Gait) minimum assist (75% patient effort);verbal cues;2 person assist;nonverbal cues (demo/gesture)  -AB     Assistive Device (Gait) walker, front-wheeled  -AB     Patient was able to Ambulate yes  -AB     Distance in Feet (Gait) 2  -AB     Deviations/Abnormal Patterns (Gait) bilateral deviations;gait speed decreased;stride length decreased;base of support, narrow  -AB     Bilateral Gait Deviations forward flexed posture;heel strike decreased  -AB     Right Sided Gait Deviations weight shift ability decreased  -AB     [place] Level (Stairs) unable to assess  -AB     Comment, (Gait/Stairs) Pt ambulated with step through gait pattern and decreased weight acceptance onto RLE. Cues  provided for walker positioning and upright posture. No overt LOB or knee buckling. Min A to steady. Pt reports significant pain in RLE limiting further activity. RN notified.  -AB               User Key  (r) = Recorded By, (t) = Taken By, (c) = Cosigned By      Initials Name Provider Type    AB Cesilia Garcia, PT Physical Therapist                   Obj/Interventions       Row Name 02/05/25 1455          Range of Motion Comprehensive    General Range of Motion bilateral lower extremity ROM WFL  -AB       Row Name 02/05/25 1455          Strength Comprehensive (MMT)    General Manual Muscle Testing (MMT) Assessment lower extremity strength deficits identified  -AB     Comment, General Manual Muscle Testing (MMT) Assessment LLE grossly 4-/5; RLE grossly 3-/5  -AB       Row Name 02/05/25 1455          Motor Skills    Therapeutic Exercise --  unable to progress to HEP d/t pain.  -AB       Row Name 02/05/25 1450          Balance    Balance Assessment sitting static balance;standing static balance;standing dynamic balance;sitting dynamic balance  -AB     Static Sitting Balance contact guard  -AB     Dynamic Sitting Balance contact guard  -AB     Position, Sitting Balance unsupported;sitting edge of bed  -AB     Static Standing Balance minimal assist  -AB     Dynamic Standing Balance minimal assist;2-person assist;verbal cues;non-verbal cues (demo/gesture)  -AB     Position/Device Used, Standing Balance supported;walker, front-wheeled  -AB     Balance Interventions sitting;standing;sit to stand;supported;static;dynamic;occupation based/functional task  -AB     Comment, Balance No overt LOB or knee buckling. Min A to steady  -AB       Row Name 02/05/25 1453          Sensory Assessment (Somatosensory)    Bilateral LE Sensory Assessment general sensation;impaired;other (see comments)  at baseline  -AB               User Key  (r) = Recorded By, (t) = Taken By, (c) = Cosigned By      Initials Name Provider Type    AB Jose  Cesilia LÓPEZ, PT Physical Therapist                   Goals/Plan       Row Name 02/05/25 1546          Bed Mobility Goal 1 (PT)    Activity/Assistive Device (Bed Mobility Goal 1, PT) scooting;sit to supine  -AB     [place] Level/Cues Needed (Bed Mobility Goal 1, PT) standby assist  -AB     Time Frame (Bed Mobility Goal 1, PT) short term goal (STG);5 days  -AB       Row Name 02/05/25 1546          Transfer Goal 1 (PT)    Activity/Assistive Device (Transfer Goal 1, PT) sit-to-stand/stand-to-sit;bed-to-chair/chair-to-bed;walker, rolling  -AB     [place] Level/Cues Needed (Transfer Goal 1, PT) standby assist  -AB     Time Frame (Transfer Goal 1, PT) long term goal (LTG);10 days  -AB       Row Name 02/05/25 1546          Gait Training Goal 1 (PT)    Activity/Assistive Device (Gait Training Goal 1, PT) assistive device use;walker, rolling;gait (walking locomotion)  -AB     [place] Level (Gait Training Goal 1, PT) contact guard required  -AB     Distance (Gait Training Goal 1, PT) 150  -AB     Time Frame (Gait Training Goal 1, PT) long term goal (LTG);10 days  -AB       Row Name 02/05/25 1546          Therapy Assessment/Plan (PT)    Planned Therapy Interventions (PT) balance training;bed mobility training;gait training;home exercise program;patient/family education;postural re-education;transfer training;stretching;strengthening;ROM (range of motion)  -AB               User Key  (r) = Recorded By, (t) = Taken By, (c) = Cosigned By      Initials Name Provider Type    AB Cesilia Garcia, PT Physical Therapist                   Clinical Impression       Row Name 02/05/25 1457          Pain    Pretreatment Pain Rating 9/10  -AB     Posttreatment Pain Rating 9/10  -AB     Pain Location extremity  -AB     Pain Side/Orientation right;lower  -AB     Pain Management Interventions activity modification encouraged;exercise or physical activity utilized;positioning techniques utilized  -AB     Response to Pain Interventions  activity participation with increased pain  -AB     Pre/Posttreatment Pain Comment RN notified. Shooting pain down RLE.  -AB       Row Name 02/05/25 1457          Plan of Care Review    Plan of Care Reviewed With patient  -AB     Progress no change  -AB     Outcome Evaluation PT initial eval completed. Pt presents below baseline with acute pain, generalized weakness, and impaired balance. Pt ambulated 2' with min Ax2 and RW. Activity limited by RLE pain, RN aware. Spinal precautions reviewed. Pt verbalized understanding. Further IPPT is warrented. She would benefit from OT eval for addressing ADL needs. PT rec d/c to IRF for best functional outcomes.  -AB       Row Name 02/05/25 1457          Therapy Assessment/Plan (PT)    Patient/Family Therapy Goals Statement (PT) go home  -AB     Rehab Potential (PT) good  -AB     Criteria for Skilled Interventions Met (PT) yes;meets criteria;skilled treatment is necessary  -AB     Therapy Frequency (PT) daily  -AB     Predicted Duration of Therapy Intervention (PT) 10 days  -AB       Row Name 02/05/25 1457          Vital Signs    Pre Systolic BP Rehab 133  -AB     Pre Treatment Diastolic BP 79  -AB     Post Systolic BP Rehab 132  -AB     Post Treatment Diastolic BP 96  -AB     Pre SpO2 (%) 98  -AB     O2 Delivery Pre Treatment room air  -AB     O2 Delivery Intra Treatment room air  -AB     Post SpO2 (%) 99  -AB     O2 Delivery Post Treatment room air  -AB     Pre Patient Position Supine  -AB     Intra Patient Position Standing  -AB     Post Patient Position Supine  -AB       Row Name 02/05/25 1457          Positioning and Restraints    Pre-Treatment Position in bed  -AB     Post Treatment Position bed  -AB     In Bed notified nsg;supine;call light within reach;encouraged to call for assist;exit alarm on;SCD pump applied  -AB               User Key  (r) = Recorded By, (t) = Taken By, (c) = Cosigned By      Initials Name Provider Type    AB Cesilia Garcia, PT Physical Therapist                    Outcome Measures       Row Name 02/05/25 1546 02/05/25 1006       How much help from another person do you currently need...    Turning from your back to your side while in flat bed without using bedrails? 3  -AB 3  -EA    Moving from lying on back to sitting on the side of a flat bed without bedrails? 3  -AB 3  -EA    Moving to and from a bed to a chair (including a wheelchair)? 3  -AB 2  -EA    Standing up from a chair using your arms (e.g., wheelchair, bedside chair)? 3  -AB 2  -EA    Climbing 3-5 steps with a railing? 2  -AB 1  -EA    To walk in hospital room? 3  -AB 2  -EA    AM-PAC 6 Clicks Score (PT) 17  -AB 13  -EA    Highest Level of Mobility Goal 5 --> Static standing  -AB 4 --> Transfer to chair/commode  -EA      Row Name 02/05/25 1546          Functional Assessment    Outcome Measure Options AM-PAC 6 Clicks Basic Mobility (PT)  -AB               User Key  (r) = Recorded By, (t) = Taken By, (c) = Cosigned By      Initials Name Provider Type     Maty Mullen, RN Registered Nurse    Cesilia Álvarez, PT Physical Therapist                                 Physical Therapy Education       Title: PT OT SLP Therapies (In Progress)       Topic: Physical Therapy (In Progress)       Point: Mobility training (Done)       Learning Progress Summary            Patient Acceptance, E,D, VU,NR by AB at 2/5/2025 1547                      Point: Home exercise program (Not Started)       Learner Progress:  Not documented in this visit.              Point: Body mechanics (Done)       Learning Progress Summary            Patient Acceptance, E,D, VU,NR by AB at 2/5/2025 1547                      Point: Precautions (Done)       Learning Progress Summary            Patient Acceptance, E,D, VU,NR by AB at 2/5/2025 1547                                      User Key       Initials Effective Dates Name Provider Type Discipline    AB 09/22/22 -  Cesilia Garcia, PT Physical Therapist PT                  PT  Recommendation and Plan  Planned Therapy Interventions (PT): balance training, bed mobility training, gait training, home exercise program, patient/family education, postural re-education, transfer training, stretching, strengthening, ROM (range of motion)  Progress: no change  Outcome Evaluation: PT initial eval completed. Pt presents below baseline with acute pain, generalized weakness, and impaired balance. Pt ambulated 2' with min Ax2 and RW. Activity limited by RLE pain, RN aware. Spinal precautions reviewed. Pt verbalized understanding. Further IPPT is warrented. She would benefit from OT eval for addressing ADL needs. PT rec d/c to IRF for best functional outcomes.     Time Calculation:   PT Evaluation Complexity  History, PT Evaluation Complexity: 3 or more personal factors and/or comorbidities  Examination of Body Systems (PT Eval Complexity): total of 3 or more elements  Clinical Presentation (PT Evaluation Complexity): evolving  Clinical Decision Making (PT Evaluation Complexity): moderate complexity  Overall Complexity (PT Evaluation Complexity): moderate complexity     PT Charges       Row Name 02/05/25 1547             Time Calculation    Start Time 1417  -AB      PT Received On 02/05/25  -AB      PT Goal Re-Cert Due Date 02/15/25  -AB         Untimed Charges    PT Eval/Re-eval Minutes 50  -AB         Total Minutes    Untimed Charges Total Minutes 50  -AB       Total Minutes 50  -AB                User Key  (r) = Recorded By, (t) = Taken By, (c) = Cosigned By      Initials Name Provider Type    AB Cesilia Garcia, PT Physical Therapist                  Therapy Charges for Today       Code Description Service Date Service Provider Modifiers Qty    84174143320 HC PT EVAL MOD COMPLEXITY 4 2/5/2025 Cesilia Garcia, PT GP 1    99817537204 HC PT THER SUPP EA 15 MIN 2/5/2025 Cesilia Garcia, PT GP 3            PT G-Codes  Outcome Measure Options: AM-PAC 6 Clicks Basic Mobility (PT)  AM-PAC 6 Clicks Score  (PT): 17  PT Discharge Summary  Anticipated Discharge Disposition (PT): inpatient rehabilitation facility    Cesilia Garcia, PT  2/5/2025

## 2025-02-05 NOTE — PLAN OF CARE
Goal Outcome Evaluation:  Plan of Care Reviewed With: patient        Progress: no change  Outcome Evaluation: PT initial eval completed. Pt presents below baseline with acute pain, generalized weakness, and impaired balance. Pt ambulated 2' with min Ax2 and RW. Activity limited by RLE pain, RN aware. Spinal precautions reviewed. Pt verbalized understanding. Further IPPT is warrented. She would benefit from OT eval for addressing ADL needs. PT rec d/c to IRF for best functional outcomes.    Anticipated Discharge Disposition (PT): inpatient rehabilitation facility

## 2025-02-05 NOTE — PROGRESS NOTES
"NEUROSURGERY PROGRESS NOTE     LOS: 2 days   Patient Care Team:  Savanna Juarez MD as PCP - General (Internal Medicine)  Kirk Deutsch MD as Consulting Physician (Rheumatology)  Arley Shaw APRN as Nurse Practitioner (Rheumatology)  Eliezer Cavazos MD as Surgeon (Neurosurgery)    Chief Complaint: Low back and lower extremity pain with walking and standing intolerance.    POD#: 2 Days Post-Op  Procedures:  L2-5 laminectomies.    Interval History:   Patient Complaints: Nausea.  Patient Denies: Headache.    Vital Signs: Blood pressure 129/70, pulse 79, temperature 98.2 °F (36.8 °C), temperature source Oral, resp. rate 16, height 152.4 cm (60\"), weight 61.7 kg (136 lb), SpO2 96%.  Intake/Output:   Intake/Output Summary (Last 24 hours) at 2/5/2025 0695  Last data filed at 2/5/2025 0415  Gross per 24 hour   Intake 1080 ml   Output 1120 ml   Net -40 ml     Drain output: 130/40 mL    Physical Exam:  The patient awakens easily and is appropriate.  Mild heme staining that is dried is on her dressing.       Assessment/Plan:  1.  Lumbar stenosis with neurogenic claudication status post multilevel decompression.  2.  Intraoperative dural rent.  3.  Mechanical low back pain.  4.  Rheumatoid arthritis.  5.  Congestive heart failure.  6.  Fibromyalgia.  7.  Disposition: Discontinue drain to mobilize patient.      Eliezer Cavazos MD  02/05/25  06:28 EST    "

## 2025-02-06 ENCOUNTER — READMISSION MANAGEMENT (OUTPATIENT)
Dept: CALL CENTER | Facility: HOSPITAL | Age: 80
End: 2025-02-06
Payer: MEDICARE

## 2025-02-06 VITALS
BODY MASS INDEX: 26.7 KG/M2 | TEMPERATURE: 98.5 F | OXYGEN SATURATION: 98 % | DIASTOLIC BLOOD PRESSURE: 69 MMHG | WEIGHT: 136 LBS | RESPIRATION RATE: 18 BRPM | HEIGHT: 60 IN | HEART RATE: 96 BPM | SYSTOLIC BLOOD PRESSURE: 103 MMHG

## 2025-02-06 PROCEDURE — 97530 THERAPEUTIC ACTIVITIES: CPT

## 2025-02-06 PROCEDURE — 97116 GAIT TRAINING THERAPY: CPT

## 2025-02-06 PROCEDURE — 97110 THERAPEUTIC EXERCISES: CPT

## 2025-02-06 PROCEDURE — 97535 SELF CARE MNGMENT TRAINING: CPT

## 2025-02-06 PROCEDURE — 99024 POSTOP FOLLOW-UP VISIT: CPT | Performed by: NEUROLOGICAL SURGERY

## 2025-02-06 PROCEDURE — 97166 OT EVAL MOD COMPLEX 45 MIN: CPT

## 2025-02-06 PROCEDURE — 25010000002 HEPARIN (PORCINE) PER 1000 UNITS: Performed by: NEUROLOGICAL SURGERY

## 2025-02-06 RX ORDER — HYDROCODONE BITARTRATE AND ACETAMINOPHEN 5; 325 MG/1; MG/1
1 TABLET ORAL 3 TIMES DAILY PRN
Qty: 15 TABLET | Refills: 0 | Status: SHIPPED | OUTPATIENT
Start: 2025-02-06

## 2025-02-06 RX ORDER — METHOTREXATE 2.5 MG/1
17.5 TABLET ORAL WEEKLY
Start: 2025-02-17

## 2025-02-06 RX ADMIN — DULOXETINE HYDROCHLORIDE 60 MG: 60 CAPSULE, DELAYED RELEASE ORAL at 09:03

## 2025-02-06 RX ADMIN — BUPROPION HYDROCHLORIDE 150 MG: 150 TABLET, EXTENDED RELEASE ORAL at 09:03

## 2025-02-06 RX ADMIN — SPIRONOLACTONE 25 MG: 25 TABLET ORAL at 09:03

## 2025-02-06 RX ADMIN — HEPARIN SODIUM 5000 UNITS: 5000 INJECTION INTRAVENOUS; SUBCUTANEOUS at 14:10

## 2025-02-06 RX ADMIN — SENNOSIDES AND DOCUSATE SODIUM 2 TABLET: 50; 8.6 TABLET ORAL at 09:03

## 2025-02-06 RX ADMIN — ATORVASTATIN CALCIUM 10 MG: 10 TABLET, FILM COATED ORAL at 09:03

## 2025-02-06 RX ADMIN — HEPARIN SODIUM 5000 UNITS: 5000 INJECTION INTRAVENOUS; SUBCUTANEOUS at 05:10

## 2025-02-06 RX ADMIN — FUROSEMIDE 80 MG: 80 TABLET ORAL at 09:03

## 2025-02-06 RX ADMIN — HYDROCODONE BITARTRATE AND ACETAMINOPHEN 1 TABLET: 5; 325 TABLET ORAL at 04:14

## 2025-02-06 RX ADMIN — HYDROCODONE BITARTRATE AND ACETAMINOPHEN 1 TABLET: 5; 325 TABLET ORAL at 18:50

## 2025-02-06 RX ADMIN — HYDROCODONE BITARTRATE AND ACETAMINOPHEN 1 TABLET: 5; 325 TABLET ORAL at 09:04

## 2025-02-06 RX ADMIN — HYDROCODONE BITARTRATE AND ACETAMINOPHEN 1 TABLET: 5; 325 TABLET ORAL at 14:10

## 2025-02-06 NOTE — PLAN OF CARE
Goal Outcome Evaluation:  Plan of Care Reviewed With: patient        Progress: no change (OT IE)  Outcome Evaluation: OT evaluation completed. Pt presents with decreased I in ADLs, related t/fs and mobility compared to PLOF limited by decreased activity tolerance, impaired balance, muscle weakness at BUEs, pain and decreased distal reach toward LEs. Pt req'd variable A for ADLs observed including min A for UBD, CGA to SBA for toileting and SBA to min A for sock mgmt with more A needed at sx R LE compared to L. Issued LH AE to assist with distal reach and improved performance noted, pt will need further training for best functional outcomes. Pt reqd CGA to min A for fxl t/fs and ADL related mobility with FWW with cues for safety, adherence to spinal precautions and eccentric control to decrease risk for falls and injury. Pt is below occupational performance and would benefit from IPOT POC and IRF at d/c when medically ready. Pt is at increased risk for falls and injury currently and lives alone. If pt chooses to return home will need A and HHOT, BSC and FWW.    Anticipated Discharge Disposition (OT): inpatient rehabilitation facility

## 2025-02-06 NOTE — CASE MANAGEMENT/SOCIAL WORK
Discharge Planning Assessment  [facility]     Patient Name: Stacia Adkins  MRN: [id number]  Today's Date: 2/6/2025    Admit Date: 2/3/2025    Plan: Home with rolling walker, bedside commode, from [facility], and [facility] PT       Discharge Plan       Row Name 02/06/25 1518       Plan    Plan Home with rolling walker, bedside commode, from [facility], and [facility]    Patient/Family in Agreement with Plan yes    Plan Comments Received call from [facility] OT recommending DME and PT for home for Ms. Adkins.    Discussed DC plan with Ms. Adkins, and she is agreeable to a rolling walker and bedside commode for home use, and did not have preference for provider.   Contacted [facility] and they will are delivering the DME to the hospital room prior to discharge.    Discussed physical therapy and CM was unable to find home health.  Xin with [facility] in home PT accepted Ms. Adkins.  MICHELLE will contact the patient to schedule her home visits.    Ms. Adkins states that her daughter will be transporting her home today.    Final Discharge Disposition Code 01 - home or self-care                                               Continued Care and Services - Admitted Since 2/3/2025       Durable Medical Equipment       Service Provider Request Status Services Address Phone Fax Patient Preferred    [facility] [address] [facility] [address] [phone] --                  Expected Discharge Date and Time       Expected Discharge Date Expected Discharge Time    Feb 6, 2025             Deepali Mauro RN

## 2025-02-06 NOTE — PROGRESS NOTES
"NEUROSURGERY PROGRESS NOTE     LOS: 3 days   Patient Care Team:  Savanna Juarez MD as PCP - General (Internal Medicine)  Kirk Deutsch MD as Consulting Physician (Rheumatology)  Arley Shaw APRN as Nurse Practitioner (Rheumatology)  Eliezer Cavazos MD as Surgeon (Neurosurgery)    Chief Complaint: Low back and lower extremity pain with walking and standing intolerance.    POD#: 3 Days Post-Op  Procedures:  L2-5 laminectomies.    Interval History:   The patient did not ambulate yesterday due to reported leg pain.  Patient Complaints: Some right leg pain.  Patient Denies: Weakness or numbness or voiding difficulty.  She has no headache.    Vital Signs: Blood pressure 115/65, pulse 91, temperature 99.5 °F (37.5 °C), temperature source Oral, resp. rate 18, height 152.4 cm (60\"), weight 61.7 kg (136 lb), SpO2 95%.  Intake/Output:   Intake/Output Summary (Last 24 hours) at 2/6/2025 0559  Last data filed at 2/6/2025 0500  Gross per 24 hour   Intake 360 ml   Output 1300 ml   Net -940 ml       Physical Exam:  The patient is alert and appropriate.  She appears quite comfortable.  Dry dressing is in place on her incision.  Motor function and tone are normal in her lower extremities.    Assessment/Plan:  1.  Lumbar stenosis with neurogenic claudication status post multilevel decompression.  2.  Intraoperative dural rent.  3.  Mechanical low back pain.  4.  Rheumatoid arthritis.  5.  Congestive heart failure.  6.  Fibromyalgia.  7.  Disposition: Mobilize patient.  She does live alone.  May need to look at skilled nursing facility if she is not up and about.      Eliezer Cavazos MD  02/06/25  05:59 EST    "

## 2025-02-06 NOTE — THERAPY TREATMENT NOTE
"Patient Name: Stacia Adkins  : 1945    MRN: [id number]                              Today's Date: 2025       Admit Date: 2/3/2025    Visit Dx:     ICD-10-CM ICD-9-CM   1. Lumbar stenosis with neurogenic claudication  M48.062 724.03     Patient Active Problem List   Diagnosis    Rhabdomyolysis    Rheumatoid arthritis involving multiple sites    Diastolic CHF, chronic    History of aortic valve replacement    Rheumatoid arthritis with rheumatoid factor of multiple sites without organ or systems involvement    History of rhabdomyolysis    Left carpal tunnel syndrome    Primary osteoarthritis of right shoulder    Multilevel degenerative disc disease    Generalized osteoarthrosis, involving multiple sites    Fibromyalgia    Age related osteoporosis    High risk medication use    Immunosuppression due to drug therapy    Lumbar stenosis with neurogenic claudication    S/P lumbar laminectomy     Past Medical History:   Diagnosis Date    Anemia     Arthritis, rheumatoid     Back pain     Cervical spondylosis     CHF (congestive heart failure)     Chronic venous insufficiency of lower extremity     Degenerative arthritis of lumbar spine     Dupuytrens contracture     High risk medication use     History of aortic valve replacement     Hypertension     Osteoporosis     Renal insufficiency     Scalp abrasion     left temporal and parietal region    Seropositive rheumatoid arthritis     Teeth missing     Vision loss of left eye     Vitamin D deficiency     Wears glasses      Past Surgical History:   Procedure Laterality Date    CARDIAC VALVE SURGERY      EPIDURAL STIMULATOR INSERTION  2011    Thoracic epidural stimulator- Dr. Clark    EPIDURAL STIMULATOR INSERTION  2012    \"Lead flipped\"- Unit was replaced, complicated by MRSA infection and removed thereafter.    EPIDURAL STIMULATOR INSERTION      removed    LUMBAR LAMINECTOMY DISCECTOMY DECOMPRESSION N/A 2/3/2025    Procedure: laminectomies with medial " facetectomies and foraminotomies L2-3, L3-4, and L4-5;  Surgeon: Eliezer Cavazos MD;  Location: [facility];  Service: Neurosurgery;  Laterality: N/A;    OTHER SURGICAL HISTORY  03/06/2014    Epidural stimulator wound I&D- Dr. Castro    SHOULDER SURGERY Right       General Information       Row Name 02/06/25 1310          Physical Therapy Time and Intention    Document Type therapy note (daily note)  -CK     Mode of Treatment physical therapy;individual therapy  -CK       Row Name 02/06/25 1310          General Information    Patient Profile Reviewed yes  -CK     Existing Precautions/Restrictions fall;spinal  -CK     Barriers to Rehab medically complex  -CK       Row Name 02/06/25 1310          Cognition    Orientation Status (Cognition) oriented x 3  -CK       Row Name 02/06/25 1310          Safety Issues/Impairments Affecting Functional Mobility    Safety Issues Affecting Function (Mobility) awareness of need for assistance;insight into deficits/self-awareness;safety precaution awareness;safety precautions follow-through/compliance;sequencing abilities  -CK     Impairments Affecting Function (Mobility) endurance/activity tolerance;pain;strength;balance;sensation/sensory awareness  -CK               User Key  (r) = Recorded By, (t) = Taken By, (c) = Cosigned By      Initials Name Provider Type    CK Thea Browne PT Physical Therapist                   Mobility       Row Name 02/06/25 1310          Bed Mobility    Comment, (Bed Mobility) [facility] pre/post treatment  -CK       Row Name 02/06/25 1310          Transfers    Comment, (Transfers) patient able to recall 2/3 spinal precautions  -CK       Row Name 02/06/25 1310          Sit-Stand Transfer    Sit-Stand [place] (Transfers) contact guard  -CK     Assistive Device (Sit-Stand Transfers) walker, front-wheeled  -CK     Comment, (Sit-Stand Transfer) cues to push up from chair and cues for upright posture upon standing. Performed some MIP upon standing due to  patient c/o feeling of pins and needles in BLE  -CK       Row Name 02/06/25 1310          Gait/Stairs (Locomotion)    [place] Level (Gait) minimum assist (75% patient effort);verbal cues;nonverbal cues (demo/gesture);1 person assist  -CK     Assistive Device (Gait) walker, front-wheeled  -CK     Distance in Feet (Gait) 35  +55  -CK     Deviations/Abnormal Patterns (Gait) bilateral deviations;barbie decreased;gait speed decreased;stride length decreased  -CK     Bilateral Gait Deviations forward flexed posture;heel strike decreased  -CK     Right Sided Gait Deviations weight shift ability decreased  -CK     Comment, (Gait/Stairs) Patient ambulated with step through gait pattern with chair in tow. She had very forward flexed posture and mild unsteadiness. Cues provided for upright posture and safe proximity of AD. She required prolonged seated rest break due to increased radicular pain in RLE with mobility. After prolonged seated rest she was able to complete additional ambulation, again limited by RLE pain.  -CK               User Key  (r) = Recorded By, (t) = Taken By, (c) = Cosigned By      Initials Name Provider Type    CK Thea Browne PT Physical Therapist                   Obj/Interventions       Row Name 02/06/25 1321          Motor Skills    Therapeutic Exercise hip;knee;ankle;other (see comments)  abdominal sets x10, unable to perform shoulder flexion due to ROM deficits  -CK       Row Name 02/06/25 1321          Hip (Therapeutic Exercise)    Hip (Therapeutic Exercise) isometric exercises;AROM (active range of motion)  -CK     Hip AROM (Therapeutic Exercise) bilateral;external rotation;internal rotation;10 repetitions  -CK     Hip Isometrics (Therapeutic Exercise) bilateral;gluteal sets;10 repetitions;3 second hold  -CK       Row Name 02/06/25 1321          Knee (Therapeutic Exercise)    Knee (Therapeutic Exercise) isometric exercises  -CK     Knee Isometrics (Therapeutic Exercise) bilateral;quad  sets;10 repetitions;3 second hold  -CK       Row Name 02/06/25 1321          Ankle (Therapeutic Exercise)    Ankle (Therapeutic Exercise) AROM (active range of motion)  -CK     Ankle AROM (Therapeutic Exercise) bilateral;dorsiflexion;plantarflexion;10 repetitions  -CK       Row Name 02/06/25 1321          Balance    Balance Assessment sitting static balance;standing static balance;standing dynamic balance  -CK     Static Sitting Balance standby assist  -CK     Position, Sitting Balance unsupported;sitting in chair  -CK     Static Standing Balance contact guard  -CK     Dynamic Standing Balance minimal assist  -CK     Position/Device Used, Standing Balance supported;walker, front-wheeled  -CK               User Key  (r) = Recorded By, (t) = Taken By, (c) = Cosigned By      Initials Name Provider Type    CK Thea Browne PT Physical Therapist                   Goals/Plan    No documentation.                  Clinical Impression       Row Name 02/06/25 1322          Pain    Pretreatment Pain Rating 6/10  -CK     Posttreatment Pain Rating 6/10  -CK     Pain Location extremity  -CK     Pain Side/Orientation right;lower  -CK     Pain Management Interventions exercise or physical activity utilized;positioning techniques utilized  -CK     Response to Pain Interventions activity participation with tolerable pain  -CK     Pre/Posttreatment Pain Comment RLE pain worsening with ambulation  -CK       Row Name 02/06/25 1322          Plan of Care Review    Plan of Care Reviewed With patient  -CK     Progress improving  -CK     Outcome Evaluation Patient showing improvement as she was able to tolerate ambulation 35'+55' Vivek with FWW requiring prolonged seated rest breaks due to increased RLE radicular pain with mobility. Reviewed spinal precautions and HEP. IPPT remains indicated to address current deficits. Continue to recommend D/C to IPR.  -CK       Row Name 02/06/25 1322          Vital Signs    Pre Systolic BP Rehab 119   -CK     Pre Treatment Diastolic BP 63  -CK     Pretreatment Heart Rate (beats/min) 93  -CK     Posttreatment Heart Rate (beats/min) 100  -CK     Pre SpO2 (%) 98  -CK     O2 Delivery Pre Treatment room air  -CK     O2 Delivery Intra Treatment room air  -CK     Post SpO2 (%) 95  -CK     O2 Delivery Post Treatment room air  -CK     Pre Patient Position Sitting  -CK     Post Patient Position Sitting  -CK       Row Name 02/06/25 1322          Positioning and Restraints    Pre-Treatment Position sitting in chair/recliner  -CK     Post Treatment Position chair  -CK     In Chair sitting;call light within reach;encouraged to call for assist;exit alarm on;waffle cushion;notified nsg  -CK               User Key  (r) = Recorded By, (t) = Taken By, (c) = Cosigned By      Initials Name Provider Type    Thea Kilpatrick PT Physical Therapist                   Outcome Measures       Row Name 02/06/25 1325          How much help from another person do you currently need...    Turning from your back to your side while in flat bed without using bedrails? 3  -CK     Moving from lying on back to sitting on the side of a flat bed without bedrails? 3  -CK     Moving to and from a bed to a chair (including a wheelchair)? 3  -CK     Standing up from a chair using your arms (e.g., wheelchair, bedside chair)? 3  -CK     Climbing 3-5 steps with a railing? 2  -CK     To walk in hospital room? 3  -CK     AM-PAC 6 Clicks Score (PT) 17  -CK     Highest Level of Mobility Goal 5 --> Static standing  -CK       Row Name 02/06/25 1325          Functional Assessment    Outcome Measure Options AM-PAC 6 Clicks Basic Mobility (PT)  -CK               User Key  (r) = Recorded By, (t) = Taken By, (c) = Cosigned By      Initials Name Provider Type    Thea Kilpatrick PT Physical Therapist                                 Physical Therapy Education       Title: PT OT SLP Therapies (Done)       Topic: Physical Therapy (Done)       Point: Mobility  training (Done)       Learning Progress Summary            Patient Acceptance, E, VU by CK at 2/6/2025 1325    Acceptance, E,D, VU,NR by AB at 2/5/2025 1547                      Point: Home exercise program (Done)       Learning Progress Summary            Patient Acceptance, E, VU by CK at 2/6/2025 1325                      Point: Body mechanics (Done)       Learning Progress Summary            Patient Acceptance, E, VU by CK at 2/6/2025 1325    Acceptance, E,D, VU,NR by AB at 2/5/2025 1547                      Point: Precautions (Done)       Learning Progress Summary            Patient Acceptance, E, VU by CK at 2/6/2025 1325    Acceptance, E,D, VU,NR by AB at 2/5/2025 1547                                      User Key       Initials Effective Dates Name Provider Type Discipline    AB 09/22/22 -  Cesilia Garcia, PT Physical Therapist PT     02/06/24 -  Thea Browne PT Physical Therapist PT                  PT Recommendation and Plan     Progress: improving  Outcome Evaluation: Patient showing improvement as she was able to tolerate ambulation 35'+55' Vivek with FWW requiring prolonged seated rest breaks due to increased RLE radicular pain with mobility. Reviewed spinal precautions and HEP. IPPT remains indicated to address current deficits. Continue to recommend D/C to IPR.     Time Calculation:         PT Charges       Row Name 02/06/25 1327             Time Calculation    Start Time 1111  -CK      PT Received On 02/06/25  -CK         Timed Charges    86878 - PT Therapeutic Exercise Minutes 15  -CK      10472 - Gait Training Minutes  15  -CK         Total Minutes    Timed Charges Total Minutes 30  -CK       Total Minutes 30  -CK                User Key  (r) = Recorded By, (t) = Taken By, (c) = Cosigned By      Initials Name Provider Type    CK Thea Browne, PT Physical Therapist                  Therapy Charges for Today       Code Description Service Date Service Provider Modifiers Qty     33973163101  PT THER PROC EA 15 MIN 2/6/2025 Thea Browne, PT GP 1    [id number] HC GAIT TRAINING EA 15 MIN 2/6/2025 Thea Browne, PT GP 1            PT G-Codes  Outcome Measure Options: AM-PAC 6 Clicks Basic Mobility (PT)  AM-PAC 6 Clicks Score (PT): 17  PT Discharge Summary  Anticipated Discharge Disposition (PT): inpatient rehabilitation facility    Thea Browne PT  2/6/2025

## 2025-02-06 NOTE — PLAN OF CARE
Goal Outcome Evaluation:           Progress: no change     No significant events overnight. Patient did not want to walk this morning. Complaining of right leg pain. Treated with prn Norco. A&Ox4 on RA. VSS. Up to bedside commode with assist x1, walker and gait belt

## 2025-02-06 NOTE — PLAN OF CARE
Goal Outcome Evaluation:  Plan of Care Reviewed With: patient        Progress: improving  Outcome Evaluation: Alert and oriented x4. VSS on room air. Up to bedside commode with walker and x1 assist. Voiding well. PRN pain meds given as ordered.

## 2025-02-06 NOTE — PLAN OF CARE
Patient participated with therapy.  Remind of all BLT restriction and wound care has been taught.  Discharge orders received and conveyed to patient.  Waiting for family to  for DC.  Equipment delivered to room.  Rxs called into her [facility] pharmacy.

## 2025-02-06 NOTE — THERAPY EVALUATION
"Patient Name: Stacia Adkins  : 1945    MRN: [id number]                              Today's Date: 2025       Admit Date: 2/3/2025    Visit Dx:     ICD-10-CM ICD-9-CM   1. Rheumatoid arthritis involving multiple sites, unspecified whether rheumatoid factor present  M06.9 714.0   2. Lumbar stenosis with neurogenic claudication  M48.062 724.03     Patient Active Problem List   Diagnosis    Rhabdomyolysis    Rheumatoid arthritis involving multiple sites    Diastolic CHF, chronic    History of aortic valve replacement    Rheumatoid arthritis with rheumatoid factor of multiple sites without organ or systems involvement    History of rhabdomyolysis    Left carpal tunnel syndrome    Primary osteoarthritis of right shoulder    Multilevel degenerative disc disease    Generalized osteoarthrosis, involving multiple sites    Fibromyalgia    Age related osteoporosis    High risk medication use    Immunosuppression due to drug therapy    Lumbar stenosis with neurogenic claudication    S/P lumbar laminectomy     Past Medical History:   Diagnosis Date    Anemia     Arthritis, rheumatoid     Back pain     Cervical spondylosis     CHF (congestive heart failure)     Chronic venous insufficiency of lower extremity     Degenerative arthritis of lumbar spine     Dupuytrens contracture     High risk medication use     History of aortic valve replacement     Hypertension     Osteoporosis     Renal insufficiency     Scalp abrasion     left temporal and parietal region    Seropositive rheumatoid arthritis     Teeth missing     Vision loss of left eye     Vitamin D deficiency     Wears glasses      Past Surgical History:   Procedure Laterality Date    CARDIAC VALVE SURGERY      EPIDURAL STIMULATOR INSERTION  2011    Thoracic epidural stimulator- Dr. Clark    EPIDURAL STIMULATOR INSERTION  2012    \"Lead flipped\"- Unit was replaced, complicated by MRSA infection and removed thereafter.    EPIDURAL STIMULATOR INSERTION   "    removed    LUMBAR LAMINECTOMY DISCECTOMY DECOMPRESSION N/A 2/3/2025    Procedure: laminectomies with medial facetectomies and foraminotomies L2-3, L3-4, and L4-5;  Surgeon: Eliezer Cavazos MD;  Location: formerly [facility];  Service: Neurosurgery;  Laterality: N/A;    OTHER SURGICAL HISTORY  03/06/2014    Epidural stimulator wound I&D- Dr. Castro    SHOULDER SURGERY Right       General Information       Row Name 02/06/25 1507          OT Time and Intention    Document Type evaluation  -JY     Mode of Treatment occupational therapy  -JY       Row Name 02/06/25 1507          General Information    Patient Profile Reviewed yes  -JY     Prior Level of Function independent:;all household mobility;community mobility;gait;transfer;bed mobility;ADL's;feeding;grooming;dressing;bathing;home management;cooking;cleaning;driving  I in all ADLs, IADLs, related t/fs & mobility w/o use of AD, actively driving; completes simple microwaveable meals; denies any falls  -JY     Existing Precautions/Restrictions fall;spinal  -JY     Barriers to Rehab medically complex  -JY       Row Name 02/06/25 1507          Occupational Profile    Environmental Supports and Barriers (Occupational Profile) step over tub w/o seat, standard toilet, DME: no AD used at baseline, will need FWW, BSC (notified CM)  -JY     Patient Goals (Occupational Profile) to return to PLOF  -JY       Row Name 02/06/25 1507          Living Environment    People in Home alone;other (see comments)  reports friends/neighbors or family can A as needed  -JY       Row Name 02/06/25 1507          Home Main Entrance    Number of Stairs, Main Entrance three  -JY     Stair Railings, Main Entrance railings on both sides of stairs  -JY       Row Name 02/06/25 1507          Stairs Within Home, Primary    Number of Stairs, Within Home, Primary none  -JY       Row Name 02/06/25 1507          Cognition    Orientation Status (Cognition) oriented x 4  -JY       Row Name 02/06/25 1507           Safety Issues/Impairments Affecting Functional Mobility    Safety Issues Affecting Function (Mobility) awareness of need for assistance;insight into deficits/self-awareness;safety precaution awareness;safety precautions follow-through/compliance;sequencing abilities  -JY     Impairments Affecting Function (Mobility) endurance/activity tolerance;pain;strength;balance;sensation/sensory awareness  -JY     Comment, Safety Issues/Impairments (Mobility) pt alert and able to follow commands; req'd safety cues throughout including review of spinal precautions  -JY               User Key  (r) = Recorded By, (t) = Taken By, (c) = Cosigned By      Initials Name Provider Type    Sindy Kapoor OT Occupational Therapist                     Mobility/ADL's       Row Name 02/06/25 1519          Bed Mobility    Bed Mobility other (see comments)  received UIC  -ELIZABETH     Rolling Left [place] (Bed Mobility) not tested  -ELIZABETH     Rolling Right [place] (Bed Mobility) not tested  -ELIZABETH     Sidelying-Sit [place] (Bed Mobility) not tested  -JY     Sit-Sidelying [place] (Bed Mobility) not tested  -JMARA     Comment, (Bed Mobility) received UIC and preferred to remain OOB thus bed mobility not assessed this session; did issue LH leg  to aid in mgmt of RLE for improved positioning in bed and reviewed log roll tech  -JY       Row Name 02/06/25 1519          Transfers    Transfers sit-stand transfer;stand-sit transfer;toilet transfer  -JY     Comment, (Transfers) skilled cues for optimal hand placement for controlled ascend, descend specifically to push up from seated surface, reach back prior to sitting once aligned and in close proximity to seated surface; emphasized eccentric control to reduce risk of injury and overall safety  -JY       Row Name 02/06/25 1519          Sit-Stand Transfer    Sit-Stand [place] (Transfers) contact guard;verbal cues  -JY     Assistive Device (Sit-Stand Transfers) walker, front-wheeled   -ELIZABETH       Row Name 02/06/25 1519          Stand-Sit Transfer    Stand-Sit [place] (Transfers) contact guard;verbal cues  -JY     Assistive Device (Stand-Sit Transfers) walker, front-wheeled  -ELIZABETH       Row Name 02/06/25 1519          Toilet Transfer    Type (Toilet Transfer) sit-stand;stand-sit  -JY     [place] Level (Toilet Transfer) minimum assist (75% patient effort);verbal cues  -JY     Assistive Device (Toilet Transfer) commode;grab bars/safety frame;raised toilet seat  -JY     Comment, (Toilet Transfer) educated pt on options for elevated height and UE support for standard toilet at home which pt reports was difficult to get up/down from PLOF; pt agreeable to BSC over toilet, notified CM on DME needs; reqd min A for safety in STS emphasizing eccentric control to decrease risk for falls and injury; emphasized using grab bars for support to aid in control  -ELIZABETH [place] Name 02/06/25 1519          Functional Mobility    Functional Mobility- Ind. Level contact guard assist;verbal cues required  -JY     Functional Mobility- Device walker, front-wheeled  -ELIZABETH     Functional Mobility-Distance (Feet) --  in room ADL related mobility  -JY     Functional Mobility- Comment defer to PT for specifics however during in room ADL related mobility pt req'd gross CGA w/ FWW support; pt presented with forward flexion despite cues for improved posture, spinal precautions; pt also reported increased pain at RLE to upward of 8/10 w/ mobility  -JY     Patient was able to Ambulate yes  -ELIZABETH       [facility] Name 02/06/25 1519          Activities of Daily Living    BADL Assessment/Intervention upper body dressing;lower body dressing;toileting;bathing;grooming  -ELIZABETH       Row Name 02/06/25 1519          Upper Body Dressing Assessment/Training    [place] Level (Upper Body Dressing) doff;don;pajama/robe;minimum assist (75% patient effort);verbal cues  -JY     Position (Upper Body Dressing) unsupported sitting  -JY     Comment,  (Upper Body Dressing) min A for proximal and posterior mgmt of gown w/ cues to avoid twisting to aid in posterior mgmt  -JY       Row Name 02/06/25 1519          Lower Body Dressing Assessment/Training    [place] Level (Lower Body Dressing) doff;don;socks;minimum assist (75% patient effort);shoes/slippers;standby assist  -JY     Position (Lower Body Dressing) unsupported sitting  -JY     Comment, (Lower Body Dressing) pt was able to bring LLE up for more proximal reach and adhere to spinal precautions, not able to position RLE as well d/t pain at RLE; educated pt on LH AE to assist with distal reach and improve I w/ decreased pain; pt return demonstrated use of sock aid for sock mgmt with need for min A, able to manage slip on shoes w/o physical A or AE however educated pt on use of AE for more difficult shoes; reviewed with pt use of LH reacher that pt has at home for pants/undergarments  -JY       Row Name 02/06/25 1519          Toileting Assessment/Training    [place] Level (Toileting) adjust/manage clothing;contact guard assist;perform perineal hygiene;other (see comments)  SBA  -JY     Assistive Devices (Toileting) commode;grab bar/safety frame;raised toilet seat  -JY     Position (Toileting) unsupported sitting;supported standing  -JY     Comment, (Toileting) pt req'd CGA for safety in clothing mgmt, completed hygiene in sitting and standing however reqd cues and close monitoring of quality; issued pt toilet wipe assist for more safe hygiene after observation of forward flexion and twisting, reviewed implications of spinal precautions on toileting  -JY       Row Name 02/06/25 1519          Bathing Assessment/Intervention    [place] Level (Bathing) not tested  -JY     Assistive Devices (Bathing) long-handled sponge  -JY     Comment, (Bathing) did not assess authentic bathing however issued LH sponge to improve distal reach for LBB after concern for safety noted with other ADLs  -JY       Row Name  02/06/25 1519          Grooming Assessment/Training    [place] Level (Grooming) wash face, hands;set up  -JY     Position (Grooming) supported sitting  -JY     Comment, (Grooming) educated pt on optimal set up of FWW at sink during sinkside g/h to facilitate support throughout  -JY               User Key  (r) = Recorded By, (t) = Taken By, (c) = Cosigned By      Initials Name Provider Type    Sindy Kapoor OT Occupational Therapist                   Obj/Interventions       Row Name 02/06/25 1531          Sensory Assessment (Somatosensory)    Sensory Assessment (Somatosensory) bilateral UE;sensation intact  -J     Bilateral UE Sensory Assessment general sensation;light touch awareness;intact  -JY     Sensory Assessment denies any numbness or tingling and able to recognize LT stimuli as intact and symmetrical at BUEs  -J       Row Name 02/06/25 1531          Vision Assessment/Intervention    Visual Impairment/Limitations corrective lenses full-time  -     Vision Assessment Comment denies any acute changes to vision  -J       Row Name 02/06/25 1531          Range of Motion Comprehensive    General Range of Motion upper extremity range of motion deficits identified  -JY     Comment, General Range of Motion pt endorses baseline deficits at B shoulder ROM, able to tolerate ~ 80-85 degrees shoulder FE and abduction; otherwise BUE AROM WFL  -JY       Row Name 02/06/25 1531          Strength Comprehensive (MMT)    General Manual Muscle Testing (MMT) Assessment upper extremity strength deficits identified  -JY     Comment, General Manual Muscle Testing (MMT) Assessment abbreviated MMT d/t recent spinal sx; based on hand grasp (4/5) and observation during fxl tasks pt grossly 4/5 aside from B shoulders  -J       Row Name 02/06/25 1531          Motor Skills    Motor Skills functional endurance;coordination  -J     Coordination bilateral;upper extremity;finger to nose;other (see comments)  finger thumb  opposition  -JY     Functional Endurance decreased activity tolerance toward more dynamic demands  -JY       Row Name 02/06/25 1531          Balance    Balance Assessment sitting static balance;sitting dynamic balance;standing static balance;standing dynamic balance  -JY     Static Sitting Balance standby assist  -JY     Dynamic Sitting Balance contact guard  -JY     Position, Sitting Balance unsupported;sitting in chair  -JY     Static Standing Balance contact guard  -JY     Dynamic Standing Balance minimal assist;verbal cues  -JY     Position/Device Used, Standing Balance supported;walker, front-wheeled  -JY     Balance Interventions sitting;standing;static;dynamic;sit to stand;supported;occupation based/functional task  -JY     Comment, Balance no overt LOB during seated or standing tasks, utilized FWW for standing  -JY               User Key  (r) = Recorded By, (t) = Taken By, (c) = Cosigned By      Initials Name Provider Type    Sindy Kapoor OT Occupational Therapist                   Goals/Plan       Row Name 02/06/25 1540          Transfer Goal 1 (OT)    Activity/Assistive Device (Transfer Goal 1, OT) sit-to-stand/stand-to-sit;bed-to-chair/chair-to-bed;toilet;commode;walker, rolling  -JY     [place] Level/Cues Needed (Transfer Goal 1, OT) contact guard required;verbal cues required  -JY     Time Frame (Transfer Goal 1, OT) long term goal (LTG);5 days  -JY     Progress/Outcome (Transfer Goal 1, OT) new goal  -JY       Row Name 02/06/25 1540          Dressing Goal 1 (OT)    Activity/Device (Dressing Goal 1, OT) lower body dressing;upper body dressing;other (see comments)  d/d TB garments with LH AE PRN  -JY     [place]/Cues Needed (Dressing Goal 1, OT) standby assist;verbal cues required  -JY     Time Frame (Dressing Goal 1, OT) long term goal (LTG);5 days  -JY     Progress/Outcome (Dressing Goal 1, OT) new goal  -JY       Row Name 02/06/25 1540          Toileting Goal 1 (OT)    Activity/Device  (Toileting Goal 1, OT) adjust/manage clothing;perform perineal hygiene;commode;commode, bedside without drop arms;grab bar/safety frame;raised toilet seat  -JY     [place] Level/Cues Needed (Toileting Goal 1, OT) standby assist  -JY     Time Frame (Toileting Goal 1, OT) long term goal (LTG);5 days  -JY     Progress/Outcome (Toileting Goal 1, OT) new goal  -JY       Row Name 02/06/25 1540          Strength Goal 1 (OT)    Strength Goal 1 (OT) Pt to complete seated HEP encompassing BUEs targeting strength and endurance with progressive sets/reps/resistance in order to improve integration in ADLs, related t/fs and mobility  -JY     Time Frame (Strength Goal 1, OT) short term goal (STG);3 days  -JY     Progress/Outcome (Strength Goal 1, OT) new goal  -JY       Row Name 02/06/25 1540          Therapy Assessment/Plan (OT)    Planned Therapy Interventions (OT) activity tolerance training;adaptive equipment training;BADL retraining;functional balance retraining;neuromuscular control/coordination retraining;occupation/activity based interventions;patient/caregiver education/training;ROM/therapeutic exercise;strengthening exercise;transfer/mobility retraining  -JY               User Key  (r) = Recorded By, (t) = Taken By, (c) = Cosigned By      Initials Name Provider Type    Sindy Kapoor, [place] Occupational Therapist                   Clinical Impression       Row Name 02/06/25 1534          Pain Assessment    Pretreatment Pain Rating 6/10  -JY     Posttreatment Pain Rating 8/10  -JY     Pain Location extremity  -JY     Pain Side/Orientation right;lower  -JY     Pain Management Interventions activity modification encouraged;exercise or physical activity utilized;positioning techniques utilized;nursing notified  -JY     Response to Pain Interventions activity participation with increased pain  -JY     Pre/Posttreatment Pain Comment RLE pain increased initially with movement and reportedly sustained throughout return to  sitting and rest; pt in tears d/t pain, RN aware and managing w/ meds  -JY       Row Name 02/06/25 1534          Plan of Care Review    Plan of Care Reviewed With patient  -JY     Progress no change  OT IE  -JY     Outcome Evaluation OT evaluation completed. Pt presents with decreased I in ADLs, related t/fs and mobility compared to PLOF limited by decreased activity tolerance, impaired balance, muscle weakness at BUEs, pain and decreased distal reach toward LEs. Pt req'd variable A for ADLs observed including min A for UBD, CGA to SBA for toileting and SBA to min A for sock mgmt with more A needed at sx R LE compared to L. Issued LH AE to assist with distal reach and improved performance noted, pt will need further training for best functional outcomes. Pt reqd CGA to min A for fxl t/fs and ADL related mobility with FWW with cues for safety, adherence to spinal precautions and eccentric control to decrease risk for falls and injury. Pt is below occupational performance and would benefit from IPOT POC and IRF at d/c when medically ready. Pt is at increased risk for falls and injury currently and lives alone. If pt chooses to return home will need A and HHOT, BSC and FWW.  -JY       Row Name 02/06/25 1534          Therapy Assessment/Plan (OT)    Patient/Family Therapy Goal Statement (OT) to return to PLOF  -JY     Rehab Potential (OT) good  -JY     Criteria for Skilled Therapeutic Interventions Met (OT) yes;skilled treatment is necessary  -JY     Therapy Frequency (OT) daily  -JY     Predicted Duration of Therapy Intervention (OT) 5 days  -JY       Row Name 02/06/25 1536          Therapy Plan Review/Discharge Plan (OT)    Equipment Needs Upon Discharge (OT) dressing equipment;bathing equipment  -JY     Anticipated Discharge Disposition (OT) inpatient rehabilitation facility  -JY       Row Name 02/06/25 1534          Vital Signs    Pre Systolic BP Rehab 119  -JY     Pre Treatment Diastolic BP 63  -JY     Pre SpO2 (%)  100  -JY     O2 Delivery Pre Treatment room air  -JY     O2 Delivery Intra Treatment room air  -JY     Post SpO2 (%) 98  -JY     O2 Delivery Post Treatment room air  -JY     Pre Patient Position Sitting  -JY     Intra Patient Position Standing  -JY     Post Patient Position Sitting  -JY       Row Name 02/06/25 1534          Positioning and Restraints    Pre-Treatment Position sitting in chair/recliner  -JY     Post Treatment Position chair  -JY     In Chair notified nsg;reclined;call light within reach;encouraged to call for assist;exit alarm on;compression device;waffle cushion;legs elevated  -JY               User Key  (r) = Recorded By, (t) = Taken By, (c) = Cosigned By      Initials Name Provider Type    Sindy Kapoor, [place] Occupational Therapist                   Outcome Measures       Row Name 02/06/25 1543          How much help from another is currently needed...    Putting on and taking off regular lower body clothing? 3  -JY     Bathing (including washing, rinsing, and drying) 2  -JY     Toileting (which includes using toilet bed pan or urinal) 3  -JY     Putting on and taking off regular upper body clothing 3  -JY     Taking care of personal grooming (such as brushing teeth) 3  -JY     Eating meals 4  -JY     AM-PAC 6 Clicks Score (OT) 18  -JY       Row Name 02/06/25 1325          How much help from another person do you currently need...    Turning from your back to your side while in flat bed without using bedrails? 3  -CK     Moving from lying on back to sitting on the side of a flat bed without bedrails? 3  -CK     Moving to and from a bed to a chair (including a wheelchair)? 3  -CK     Standing up from a chair using your arms (e.g., wheelchair, bedside chair)? 3  -CK     Climbing 3-5 steps with a railing? 2  -CK     To walk in hospital room? 3  -CK     AM-PAC 6 Clicks Score (PT) 17  -CK     Highest Level of Mobility Goal 5 --> Static standing  -CK       Row Name 02/06/25 1543 02/06/25 1323        Functional Assessment    Outcome Measure Options AM-PAC 6 Clicks Daily Activity (OT)  -ELIZABETH AM-PAC 6 Clicks Basic Mobility (PT)  -FLO              User Key  (r) = Recorded By, (t) = Taken By, (c) = Cosigned By      Initials Name Provider Type    Sindy Kapoor OT Occupational Therapist    CK Thea Browne PT Physical Therapist                    Occupational Therapy Education       Title: PT OT SLP Therapies (In Progress)       Topic: Occupational Therapy (In Progress)       Point: ADL training (In Progress)       Description:   Instruct learner(s) on proper safety adaptation and remediation techniques during self care or transfers.   Instruct in proper use of assistive devices.                  Learning Progress Summary            Patient Acceptance, E,D, NR by ELIZABETH at 2/6/2025 1359                      Point: Home exercise program (In Progress)       Description:   Instruct learner(s) on appropriate technique for monitoring, assisting and/or progressing therapeutic exercises/activities.                  Learning Progress Summary            Patient Acceptance, E,D, NR by ELIZABETH at 2/6/2025 1359                      Point: Precautions (In Progress)       Description:   Instruct learner(s) on prescribed precautions during self-care and functional transfers.                  Learning Progress Summary            Patient Acceptance, E,D, NR by ELIZABETH at 2/6/2025 1359                      Point: Body mechanics (In Progress)       Description:   Instruct learner(s) on proper positioning and spine alignment during self-care, functional mobility activities and/or exercises.                  Learning Progress Summary            Patient Acceptance, E,D, NR by ELIZABETH at 2/6/2025 1359                                      User Key       Initials Effective Dates Name Provider Type Discipline    ELIZABETH 06/16/21 -  Sindy Myers OT Occupational Therapist OT                  OT Recommendation and Plan  Planned Therapy Interventions (OT):  activity tolerance training, adaptive equipment training, BADL retraining, functional balance retraining, neuromuscular control/coordination retraining, occupation/activity based interventions, patient/caregiver education/training, ROM/therapeutic exercise, strengthening exercise, transfer/mobility retraining  Therapy Frequency (OT): daily  Plan of Care Review  Plan of Care Reviewed With: patient  Progress: no change (OT IE)  Outcome Evaluation: OT evaluation completed. Pt presents with decreased I in ADLs, related t/fs and mobility compared to PLOF limited by decreased activity tolerance, impaired balance, muscle weakness at BUEs, pain and decreased distal reach toward LEs. Pt req'd variable A for ADLs observed including min A for UBD, CGA to SBA for toileting and SBA to min A for sock mgmt with more A needed at sx R LE compared to L. Issued LH AE to assist with distal reach and improved performance noted, pt will need further training for best functional outcomes. Pt reqd CGA to min A for fxl t/fs and ADL related mobility with FWW with cues for safety, adherence to spinal precautions and eccentric control to decrease risk for falls and injury. Pt is below occupational performance and would benefit from IPOT POC and IRF at d/c when medically ready. Pt is at increased risk for falls and injury currently and lives alone. If pt chooses to return home will need A and HHOT, BSC and FWW.     Time Calculation:   Evaluation Complexity (OT)  Review Occupational Profile/Medical/Therapy History Complexity: expanded/moderate complexity  Assessment, Occupational Performance/Identification of Deficit Complexity: 3-5 performance deficits  Clinical Decision Making Complexity (OT): detailed assessment/moderate complexity  Overall Complexity of Evaluation (OT): moderate complexity     Time Calculation- OT       Row Name 02/06/25 1544 02/06/25 1327          Time Calculation- OT    OT Start Time 1359  -JY --     OT Received On  02/06/25  -JY --     OT Goal Re-Cert Due Date 02/16/25  -JY --        Timed Charges    40220 - Gait Training Minutes  -- 15  -CK     86698 - OT Therapeutic Activity Minutes 17  -JY --     05248 - OT Self Care/Mgmt Minutes 18  -JY --        Untimed Charges    OT Eval/Re-eval Minutes 50  -JY --        Total Minutes    Timed Charges Total Minutes 35  -JY 15  -CK     Untimed Charges Total Minutes 50  -JY --      Total Minutes 85  -JY 15  -CK               User Key  (r) = Recorded By, (t) = Taken By, (c) = Cosigned By      Initials Name Provider Type    Sindy Kapoor OT Occupational Therapist    CK Thea Browne, PT Physical Therapist                  Therapy Charges for Today       Code Description Service Date Service Provider Modifiers Qty    07949600612 HC OT THERAPEUTIC ACT EA 15 MIN 2/6/2025 Sindy Myers OT GO 1    51624318918 HC OT SELF CARE/MGMT/TRAIN EA 15 MIN 2/6/2025 Sindy Myers OT GO 1    70798422635 HC OT EVAL MOD COMPLEXITY 4 2/6/2025 Sindy Myers OT GO 1                 Sindy Myers OT  2/6/2025

## 2025-02-06 NOTE — CASE MANAGEMENT/SOCIAL WORK
"Continued Stay Note  [facility]     Patient Name: Stacia Adkins  MRN: [id number]  Today's Date: 2/6/2025    Admit Date: 2/3/2025    Plan: Home   Discharge Plan       Row Name 02/06/25 4742       Plan    Plan Home    Patient/Family in Agreement with Plan yes    Plan Comments Followed up with Ms. Adkins, at the bedside, for discharge planning.    Ms. Adkins has been seen by PT today and per notes, \"Patient showing improvement as she was able to tolerate ambulation 35'+55' Vivek with FWW requiring prolonged seated rest breaks due to increased RLE radicular pain with mobility. Reviewed spinal precautions and HEP. IPPT remains indicated to address current deficits. Continue to recommend D/C to IPR.\"    Discussed IPR with Ms. Adkins, and she declined rehab.    Also discussed home health needs and she declined home health as well.  She states that she has friends/neighbors and her daughter, who lives locally, that can assist her, if needed.    DC plan is home.  CM will continue to follow.                  Expected Discharge Date and Time       Expected Discharge Date Expected Discharge Time    Feb 7, 2025             Deepali Mauro RN    "

## 2025-02-07 NOTE — DISCHARGE SUMMARY
[facility]    Date of Admission: 2/3/2025  Date of Discharge:  2/6/2025    Discharge Diagnosis: Lumbar stenosis with neurogenic claudication     Procedures Performed  Procedure(s):  laminectomies with medial facetectomies and foraminotomies L2-3, L3-4, and L4-5     History of Present Illness:  Ms. Adkins is a 79 y.o. female that presented with progressive back and right lower extremity pain with walking and standing intolerance.  Studies demonstrated severe stenosis with nerve root redundancy at L2-3, L3-4, and L4-5. She consequently underwent decompressive laminectomies at these levels by Dr. Cavazos on 2/3/2025. Surgery was complicated by a small dural rent that was successfully closed intraoperatively. A flat CARLA drain was placed. Her surgical incision was closed with a nylon suture. A Larsen catheter was placed.    Hospital Course:   Ms. Adkins was admitted to the floor on strict bedrest orders. She continued to deny headache or demonstrate any signs of continued CSF leak, so she was slowly mobilized on POD #1.  She participated with both physical and occupational therapies; inpatient rehab was recommended but the patient declined this.  She ultimately discharged home on POD #3 with a walker and commode chair.    Condition on Discharge:  Stable  Discharge to: Home    PATIENT SPECIFIC EDUCATION/PLAN:  1. Follow-up with neurosurgery PAFIDEL in 2 weeks for a wound check and nylon suture removal.  2. No driving until follow-up.  3.  The patient is to keep the incision clean and dry and may get her Aquacel dressing wet in the shower.  The patient may remove the Aquacel dressing and get the incision itself wet beginning on 2/9/2025.  4. NO tub bathing or swimming until follow-up  5. Ice pack to incision(s) as needed for associated pain or swelling.  6.  The patient is to ambulate frequently at home and may sit as tolerated.  No lifting greater than 5  pounds.      Discharge Medications     Discharge Medications        New Medications        Instructions Start Date   HYDROcodone-acetaminophen 5-325 MG per tablet  Commonly known as: NORCO  Replaces: HYDROcodone-acetaminophen  MG per tablet   1 tablet, Oral, 3 Times Daily PRN      naloxone 4 MG/0.1ML nasal spray  Commonly known as: NARCAN   Call 911. Don't prime. [place] in 1 nostril for overdose. Repeat in 2-3 minutes in other nostril if no or minimal breathing/responsiveness.             Changes to Medications        Instructions Start Date   methotrexate 2.5 MG tablet  What changed: These instructions start on February 17, 2025. If you are unsure what to do until then, ask your doctor or other care provider.   17.5 mg, Oral, Weekly, On Sundays.   Start Date: February 17, 2025            Continue These Medications        Instructions Start Date   buPROPion  MG 24 hr tablet  Commonly known as: WELLBUTRIN XL   150 mg, Daily      citalopram 20 MG tablet  Commonly known as: CeleXA   No dose, route, or frequency recorded.      DULoxetine 30 MG capsule  Commonly known as: CYMBALTA   30 mg, Oral, Daily      DULoxetine 60 MG capsule  Commonly known as: CYMBALTA   60 mg, Oral, Daily      famotidine 20 MG tablet  Commonly known as: PEPCID   No dose, route, or frequency recorded.      folic acid 1 MG tablet  Commonly known as: FOLVITE   1 mg, Oral, Nightly      furosemide 80 MG tablet  Commonly known as: LASIX   80 mg, Daily      Orencia 250 MG injection  Generic drug: abatacept   750 mg, Every 28 Days      Prolia 60 MG/ML solution prefilled syringe syringe  Generic drug: denosumab   1 mL, Every 6 Months      simvastatin 20 MG tablet  Commonly known as: ZOCOR   No dose, route, or frequency recorded.      spironolactone 25 MG tablet  Commonly known as: ALDACTONE   25 mg, Oral, Daily      traZODone 100 MG tablet  Commonly known as: DESYREL   150 mg, Nightly             Stop These Medications       HYDROcodone-acetaminophen  MG per tablet  Commonly known as: NORCO  Replaced by: HYDROcodone-acetaminophen 5-325 MG per tablet              Follow-up Appointments  Future Appointments   Date Time Provider Department Center   2/14/2025 12:00 PM Nerissa Graham PA-C [facility]   6/3/2025  2:15 PM Kirk Deutsch MD [facility]     Additional Instructions for the Follow-ups that You Need to Schedule       Ambulatory Referral to Physical Therapy for Evaluation & Treatment   As directed      Specialty needed: Evaluate and treat Neuro   Weight Bearing Status: Full weight bearing (as tolerated)   Follow-up needed: Yes        Discharge Follow-up with Specified Provider: Dirk; 2 Weeks   As directed      To: Dirk   Follow Up: 2 Weeks   Follow Up Details: Follow-up with MORRO in office in approximately 10 days for wound check and suture removal                Referring Provider  MD NAYELI Banuelos Stella R., MD Laryssa Helene Cybriwsky, PA-C  02/07/25  08:55 EST

## 2025-02-07 NOTE — OUTREACH NOTE
Prep Survey      Flowsheet Row Responses   [facility] facility patient discharged from? [place]   Is LACE score < 7 ? No   Eligibility Readm Mgmt   Discharge diagnosis LUMBAR LAMINECTOMY DISCECTOMY DECOMPRESSION   Does the patient have one of the following disease processes/diagnoses(primary or secondary)? General Surgery   Does the patient have Home health ordered? No   Is there a DME ordered? Yes   What DME was ordered? rolling walker and bedside commode for home use, and did not have preference for provider. Contacted [facility]   Prep survey completed? Yes            JON MARTINEZ - Registered Nurse

## 2025-02-08 NOTE — PAYOR COMM NOTE
"Krysta Del Toro RN  [facility]  Utilization Management  P:[phone]  F:[phone]    Ref # [id number]     Apple Adkins \"John\" (79 y.o. Female)       Date of Birth   1945    Social Security Number       Address   [address] [place] [address]    Home Phone   [phone]    MRN   [id number]       Taoist   Congregational    Marital Status                               Admission Date   2/3/25    Admission Type   Elective    Admitting Provider   Eliezer Cavazos MD    Attending Provider       Department, Room/Bed   [facility] 3G, S362/1       Discharge Date   2/6/2025    Discharge Disposition   Home or Self Care    Discharge Destination                                 Attending Provider: (none)   Allergies: Gabapentin, Hydroxychloroquine Sulfate, Penicillins, Vancomycin    Isolation: None   Infection: None   Code Status: Prior    Ht: 152.4 cm (60\")   Wt: 61.7 kg (136 lb)    Admission Cmt: None   Principal Problem: Lumbar stenosis with neurogenic claudication [M48.062]                   Active Insurance as of 2/3/2025       Primary Coverage       Payor Plan Insurance Group Employer/Plan Group    ANTHEM MEDICARE REPLACEMENT ANTHEM MED ADV HMO [id number]       Payor Plan Address Payor Plan Phone Number Payor Plan Fax Number Effective Dates    [address] [phone]  1/1/2025 - None Entered    [facility] [id number]         Subscriber Name Subscriber Birth Date Member ID       APPLE ADKINS 1945 [id number]                     Emergency Contacts        (Rel.) Home Phone Work Phone Mobile Phone    NoahJosselin (Daughter) [phone] -- [phone]    marygail (Daughter) -- -- [phone]                 Discharge Summary        Nerissa Graham PA-C at 02/06/25 1934              [facility]    Date of Admission: 2/3/2025  Date of Discharge:  2/6/2025    Discharge " Diagnosis: Lumbar stenosis with neurogenic claudication     Procedures Performed  Procedure(s):  laminectomies with medial facetectomies and foraminotomies L2-3, L3-4, and L4-5     History of Present Illness:  Ms. Adkins is a 79 y.o. female that presented with progressive back and right lower extremity pain with walking and standing intolerance.  Studies demonstrated severe stenosis with nerve root redundancy at L2-3, L3-4, and L4-5. She consequently underwent decompressive laminectomies at these levels by Dr. Cavazos on 2/3/2025. Surgery was complicated by a small dural rent that was successfully closed intraoperatively. A flat CARLA drain was placed. Her surgical incision was closed with a nylon suture. A Larsen catheter was placed.    Hospital Course:   Ms. Adkins was admitted to the floor on strict bedrest orders. She continued to deny headache or demonstrate any signs of continued CSF leak, so she was slowly mobilized on POD #1.  She participated with both physical and occupational therapies; inpatient rehab was recommended but the patient declined this.  She ultimately discharged home on POD #3 with a walker and commode chair.    Condition on Discharge:  Stable  Discharge to: Home    PATIENT SPECIFIC EDUCATION/PLAN:  1. Follow-up with neurosurgery PAFIDEL in 2 weeks for a wound check and nylon suture removal.  2. No driving until follow-up.  3.  The patient is to keep the incision clean and dry and may get her Aquacel dressing wet in the shower.  The patient may remove the Aquacel dressing and get the incision itself wet beginning on 2/9/2025.  4. NO tub bathing or swimming until follow-up  5. Ice pack to incision(s) as needed for associated pain or swelling.  6.  The patient is to ambulate frequently at home and may sit as tolerated.  No lifting greater than 5 pounds.      Discharge Medications     Discharge Medications        New Medications        Instructions Start Date   HYDROcodone-acetaminophen 5-325 MG per  tablet  Commonly known as: NORCO  Replaces: HYDROcodone-acetaminophen  MG per tablet   1 tablet, Oral, 3 Times Daily PRN      naloxone 4 MG/0.1ML nasal spray  Commonly known as: NARCAN   Call 911. Don't prime. [place] in 1 nostril for overdose. Repeat in 2-3 minutes in other nostril if no or minimal breathing/responsiveness.             Changes to Medications        Instructions Start Date   methotrexate 2.5 MG tablet  What changed: These instructions start on February 17, 2025. If you are unsure what to do until then, ask your doctor or other care provider.   17.5 mg, Oral, Weekly, On Sundays.   Start Date: February 17, 2025            Continue These Medications        Instructions Start Date   buPROPion  MG 24 hr tablet  Commonly known as: WELLBUTRIN XL   150 mg, Daily      citalopram 20 MG tablet  Commonly known as: CeleXA   No dose, route, or frequency recorded.      DULoxetine 30 MG capsule  Commonly known as: CYMBALTA   30 mg, Oral, Daily      DULoxetine 60 MG capsule  Commonly known as: CYMBALTA   60 mg, Oral, Daily      famotidine 20 MG tablet  Commonly known as: PEPCID   No dose, route, or frequency recorded.      folic acid 1 MG tablet  Commonly known as: FOLVITE   1 mg, Oral, Nightly      furosemide 80 MG tablet  Commonly known as: LASIX   80 mg, Daily      Orencia 250 MG injection  Generic drug: abatacept   750 mg, Every 28 Days      Prolia 60 MG/ML solution prefilled syringe syringe  Generic drug: denosumab   1 mL, Every 6 Months      simvastatin 20 MG tablet  Commonly known as: ZOCOR   No dose, route, or frequency recorded.      spironolactone 25 MG tablet  Commonly known as: ALDACTONE   25 mg, Oral, Daily      traZODone 100 MG tablet  Commonly known as: DESYREL   150 mg, Nightly             Stop These Medications      HYDROcodone-acetaminophen  MG per tablet  Commonly known as: NORCO  Replaced by: HYDROcodone-acetaminophen 5-325 MG per tablet              Follow-up Appointments  Future  Appointments   Date Time Provider Department Center   2/14/2025 12:00 PM Nerissa Graham PA-C [facility]   6/3/2025  2:15 PM Kirk Deutsch MD [facility]     Additional Instructions for the Follow-ups that You Need to Schedule       Ambulatory Referral to Physical Therapy for Evaluation & Treatment   As directed      Specialty needed: Evaluate and treat Neuro   Weight Bearing Status: Full weight bearing (as tolerated)   Follow-up needed: Yes        Discharge Follow-up with Specified Provider: Dirk; 2 Weeks   As directed      To: Dirk   Follow Up: 2 Weeks   Follow Up Details: Follow-up with MORRO in office in approximately 10 days for wound check and suture removal                Referring Provider  MD NAYELI Banuelos Stella R., MD Laryssa Helene Cybriwsky, PA-C  02/07/25  08:55 EST                Electronically signed by Nerissa Graham PA-C at 02/07/25 2895

## 2025-02-11 ENCOUNTER — READMISSION MANAGEMENT (OUTPATIENT)
Dept: CALL CENTER | Facility: HOSPITAL | Age: 80
End: 2025-02-11
Payer: MEDICARE

## 2025-02-11 NOTE — OUTREACH NOTE
General Surgery Week 1 Survey      Flowsheet Row Responses   [facility] patient discharged from? [place]   Does the patient have one of the following disease processes/diagnoses(primary or secondary)? General Surgery   Week 1 attempt successful? Yes   Call start time 0906   Call end time 0911   Discharge diagnosis LUMBAR LAMINECTOMY DISCECTOMY DECOMPRESSION   Meds reviewed with patient/caregiver? Yes   Is the patient having any side effects they believe may be caused by any medication additions or changes? No   Does the patient have all medications related to this admission filled (includes all antibiotics, pain medications, etc.) Yes   Is the patient taking all medications as directed (includes completed medication regime)? Yes   Does the patient have a follow up appointment scheduled with their surgeon? Yes   Has the patient kept scheduled appointments due by today? N/A   Has home health visited the patient within 72 hours of discharge? N/A   What DME was ordered? rolling walker and bedside commode for home use, and did not have preference for provider. Contacted [facility]   Has all DME been delivered? Yes   Psychosocial issues? No   Did the patient receive a copy of their discharge instructions? Yes   Nursing interventions Reviewed instructions with patient   What is the patient's perception of their health status since discharge? Improving   Nursing interventions Nurse provided patient education   Is the patient /caregiver able to teach back basic post-op care? Lifting as instructed by MD in discharge instructions, No tub bath, swimming, or hot tub until instructed by MD, Take showers only when approved by MD-sponge bathe until then   Is the patient/caregiver able to teach back signs and symptoms of incisional infection? Increased redness, swelling or pain at the incisonal site, Increased drainage or bleeding, Incisional warmth, Pus or odor from incision, Fever   Is the patient/caregiver able to teach  "back steps to recovery at home? Set small, achievable goals for return to baseline health   Is the patient/caregiver able to teach back the hierarchy of who to call/visit for symptoms/problems? PCP, Specialist, Home health nurse, Urgent Care, ED, 911 Yes   Week 1 call completed? Yes   Wrap up additional comments Pt will call  as she has been have \"spasms\" in her legs at night and can't sleep. Pt denies issues with site and has surgeon appt this week.   Call end time 0911            SABI FOSTER - Registered Nurse  "

## 2025-02-12 ENCOUNTER — TELEPHONE (OUTPATIENT)
Dept: NEUROSURGERY | Facility: CLINIC | Age: 80
End: 2025-02-12
Payer: MEDICARE

## 2025-02-12 DIAGNOSIS — M48.062 SPINAL STENOSIS OF LUMBAR REGION WITH NEUROGENIC CLAUDICATION: ICD-10-CM

## 2025-02-12 DIAGNOSIS — M51.9 LUMBAR DISC DISEASE: Primary | ICD-10-CM

## 2025-02-12 RX ORDER — TIZANIDINE HYDROCHLORIDE 4 MG/1
4 CAPSULE, GELATIN COATED ORAL 3 TIMES DAILY PRN
Qty: 45 CAPSULE | Refills: 0 | Status: SHIPPED | OUTPATIENT
Start: 2025-02-12

## 2025-02-12 NOTE — TELEPHONE ENCOUNTER
Provider:  Dirk  Surgery/Procedure:  Laminectomies with medial facetectomies and foraminotomies L2-3, L3-4, and L4-5  Surgery/Procedure Date:  2/3/25  Last visit:   1/21/25  Next visit: 2/14/25     Reason for call:  Patient reports she is having a lot of muscle spasms in her lower back around the surgical site, that is worse at night. She is requesting a muscle relaxer to be sent to [facility]. I've asked her to place some heating pads on the tight muscles at the moment to try to provide some relief.

## 2025-02-14 ENCOUNTER — OFFICE VISIT (OUTPATIENT)
Dept: NEUROSURGERY | Facility: CLINIC | Age: 80
End: 2025-02-14
Payer: MEDICARE

## 2025-02-14 VITALS
HEIGHT: 60 IN | TEMPERATURE: 97.5 F | SYSTOLIC BLOOD PRESSURE: 110 MMHG | DIASTOLIC BLOOD PRESSURE: 68 MMHG | WEIGHT: 134 LBS | BODY MASS INDEX: 26.31 KG/M2

## 2025-02-14 DIAGNOSIS — M48.062 SPINAL STENOSIS OF LUMBAR REGION WITH NEUROGENIC CLAUDICATION: Primary | ICD-10-CM

## 2025-02-14 PROCEDURE — 99024 POSTOP FOLLOW-UP VISIT: CPT

## 2025-02-14 RX ORDER — MUPIROCIN 20 MG/G
1 OINTMENT TOPICAL
COMMUNITY
Start: 2025-01-27

## 2025-02-14 NOTE — PROGRESS NOTES
Patient: Stacia Adkins  : 1945  Chart #: [id number]    Date of Service: 2025    Chief Complaint: Post-op; low back and right lower extremity pain    HPI: Ms. Adkins is a pleasant 79 y.o. female that presented with progressive back and right lower extremity pain with walking and standing intolerance.  Studies demonstrated severe stenosis with nerve root redundancy at L2-3, L3-4, and L4-5. She consequently underwent decompressive laminectomies at these levels by Dr. Cavazos on 2/3/2025. Surgery was complicated by a small dural rent that was successfully closed intraoperatively. The patient had an uneventful hospital stay and elected to forego inpatient rehab. She discharged home on POD #3. Today, she presents for a post-operative incision check and nylon suture removal. She denies fever, chills, or drainage from her incision. She is working with a home health PT and is doing well. Her preoperative right leg symptoms have greatly improved.      Review of Systems   Constitutional:  Negative for activity change, appetite change, chills, diaphoresis, fatigue, fever and unexpected weight change.   HENT:  Negative for congestion, dental problem, drooling, ear discharge, ear pain, facial swelling, hearing loss, mouth sores, nosebleeds, postnasal drip, rhinorrhea, sinus pressure, sinus pain, sneezing, sore throat, tinnitus, trouble swallowing and voice change.    Eyes:  Negative for photophobia, pain, discharge, redness, itching and visual disturbance.   Respiratory:  Negative for apnea, cough, choking, chest tightness, shortness of breath, wheezing and stridor.    Cardiovascular:  Negative for chest pain, palpitations and leg swelling.   Gastrointestinal:  Negative for abdominal distention, abdominal pain, anal bleeding, blood in stool, constipation, diarrhea, nausea, rectal pain and vomiting.   Endocrine: Negative for cold intolerance, heat intolerance, polydipsia, polyphagia and polyuria.   Genitourinary:   "Negative for decreased urine volume, difficulty urinating, dyspareunia, dysuria, enuresis, flank pain, frequency, genital sores, hematuria, menstrual problem, pelvic pain, urgency, vaginal bleeding, vaginal discharge and vaginal pain.   Musculoskeletal:  Positive for back pain. Negative for arthralgias, gait problem, joint swelling, myalgias, neck pain and neck stiffness.   Skin:  Negative for color change, pallor, rash and wound.   Allergic/Immunologic: Negative for environmental allergies, food allergies and immunocompromised state.   Neurological:  Negative for dizziness, tremors, seizures, syncope, facial asymmetry, speech difficulty, weakness, light-headedness, numbness and headaches.   Hematological:  Negative for adenopathy. Does not bruise/bleed easily.   Psychiatric/Behavioral:  Negative for agitation, behavioral problems, confusion, decreased concentration, dysphoric mood, hallucinations, self-injury, sleep disturbance and suicidal ideas. The patient is not nervous/anxious and is not hyperactive.         The patient's past medical history, past surgical history, family history, and social history have been reviewed at length in the electronic medical record.    Physical examination:  Blood pressure 110/68, temperature 97.5 °F (36.4 °C), temperature source Infrared, height 152.4 cm (60\"), weight 60.8 kg (134 lb).  Constitutional: The patient is well-nourished and pleasant. She appears her stated age. She ambulates with her walker and has a profoundly stooped posture.    HEENT: normocephalic, atraumatic, sclera clear    Dermatological: Lumbar incision is clean, dry, and without evidence of erythema or induration. I did note mild heme stain on the Aquacel dressing that I removed.  There is an area at the north pole of the incision where a small skin flap is still not fully closed, secondary to an oversew. I cleaned the site with isopropyl alcohol and removed nylon sutures. There was no drainage after suture " removal.     Neurological Exam   A/A/C, speech clear, attention normal, conversant, answers questions appropriately, good historian.  Motor examination does not reveal weakness in the , upper or lower extremities.   Sensation is intact.  Gait is normal, balance is normal.   No tremors are noted.    Medical Decision Making  Diagnoses and all orders for this visit:    1. Spinal stenosis of lumbar region with neurogenic claudication (Primary)    2. S/P laminectomies     Ms. Adkins is pleased with her surgical outcome and wishes Dr. Cavazos a happy Edwards's Day. I'd like to see her back in about 10 days to recheck her incision. I'd still like her to avoid driving as she is utilizing the narcotic medication prescribed postoperatively. She is slowly increasing her activities. We carefully reviewed signs of infection she should make our office aware of in the interim.     Any copied data from previous notes included in the (1) HPI, (2) PE, (3) MDM and/or assessment and plan has been reviewed and is accurate as of this day.    Nerissa Graham PA-C     Patient Care Team:  Savanna Juarez MD as PCP - General (Internal Medicine)  Kirk Deutsch MD as Consulting Physician (Rheumatology)  Arley Shaw APRN as Nurse Practitioner (Rheumatology)  Eliezer Cavazos MD as Surgeon (Neurosurgery)

## 2025-02-20 ENCOUNTER — READMISSION MANAGEMENT (OUTPATIENT)
Dept: CALL CENTER | Facility: HOSPITAL | Age: 80
End: 2025-02-20
Payer: MEDICARE

## 2025-02-20 NOTE — OUTREACH NOTE
General Surgery Week 2 Survey      Flowsheet Row Responses   [facility] patient discharged from? [facility]   Does the patient have one of the following disease processes/diagnoses(primary or secondary)? General Surgery   Week 2 attempt successful? Yes   Call start time 0919   Call end time 0924   Discharge diagnosis LUMBAR LAMINECTOMY DISCECTOMY DECOMPRESSION   Meds reviewed with patient/caregiver? Yes   Is the patient having any side effects they believe may be caused by any medication additions or changes? No   Is the patient taking all medications as directed (includes completed medication regime)? Yes   Does the patient have a follow up appointment scheduled with their surgeon? Yes   Has the patient kept scheduled appointments due by today? Yes   What is the Home health agency?  [facility]   Has home health visited the patient within 72 hours of discharge? Yes   Home health comments Pt reports she does  PT twice a week   Psychosocial issues? No   What is the patient's perception of their health status since discharge? Improving  [pt reports she has some numbness and tingling in farhana feet today, however it comes and goes and varies in location]   Nursing interventions Nurse provided patient education   Is the patient /caregiver able to teach back basic post-op care? Keep incision areas clean,dry and protected, Lifting as instructed by MD in discharge instructions   Is the patient/caregiver able to teach back signs and symptoms of incisional infection? Increased redness, swelling or pain at the incisonal site, Increased drainage or bleeding   Is the patient/caregiver able to teach back steps to recovery at home? Set small, achievable goals for return to baseline health   Is the patient/caregiver able to teach back the hierarchy of who to call/visit for symptoms/problems? PCP, Specialist, Home health nurse, Urgent Care, ED, 911 Yes   Week 2 call completed? Yes   Is the patient interested in additional calls from an  ambulatory ? No   Would this patient benefit from a Referral to [facility] Social Work? No   Call end time [id number]            Cathy CRUZ - Registered Nurse

## 2025-02-26 ENCOUNTER — OFFICE VISIT (OUTPATIENT)
Dept: NEUROSURGERY | Facility: CLINIC | Age: 80
End: 2025-02-26
Payer: MEDICARE

## 2025-02-26 VITALS — HEIGHT: 60 IN | TEMPERATURE: 97.6 F | WEIGHT: 139 LBS | BODY MASS INDEX: 27.29 KG/M2

## 2025-02-26 DIAGNOSIS — M51.9 LUMBAR DISC DISEASE: ICD-10-CM

## 2025-02-26 DIAGNOSIS — M48.062 SPINAL STENOSIS OF LUMBAR REGION WITH NEUROGENIC CLAUDICATION: Primary | ICD-10-CM

## 2025-02-26 PROCEDURE — 99024 POSTOP FOLLOW-UP VISIT: CPT

## 2025-02-26 NOTE — PROGRESS NOTES
Patient: Stacia Adkins  : 1945  Chart #: [id number]    Date of Service: 2025    Chief Complaint: Post-op; low back and right lower extremity pain    HPI: Ms. Adkins is a pleasant 79 y.o. female that presented with progressive back and right lower extremity pain with walking and standing intolerance.  Studies demonstrated severe stenosis with nerve root redundancy at L2-3, L3-4, and L4-5. She consequently underwent decompressive laminectomies at these levels by Dr. Cavazos on 2/3/2025. Surgery was complicated by a small dural rent that was successfully closed intraoperatively. The patient had an uneventful hospital stay and elected to forego inpatient rehab. She discharged home on POD #3. Today, she presents for a post-operative incision check. She denies fever, chills, or drainage from her incision.  She has noted some swelling in bilateral feet and ankles, and saw her primary care provider this week for evaluation.  Overall, she is doing much better in terms of her back and leg symptoms.      Review of Systems   Constitutional:  Negative for activity change, appetite change, chills, diaphoresis, fatigue, fever and unexpected weight change.   HENT:  Negative for congestion, dental problem, drooling, ear discharge, ear pain, facial swelling, hearing loss, mouth sores, nosebleeds, postnasal drip, rhinorrhea, sinus pressure, sinus pain, sneezing, sore throat, tinnitus, trouble swallowing and voice change.    Eyes:  Negative for photophobia, pain, discharge, redness, itching and visual disturbance.   Respiratory:  Negative for apnea, cough, choking, chest tightness, shortness of breath, wheezing and stridor.    Cardiovascular:  Negative for chest pain, palpitations and leg swelling.   Gastrointestinal:  Negative for abdominal distention, abdominal pain, anal bleeding, blood in stool, constipation, diarrhea, nausea, rectal pain and vomiting.   Endocrine: Negative for cold intolerance, heat intolerance,  "polydipsia, polyphagia and polyuria.   Genitourinary:  Negative for decreased urine volume, difficulty urinating, dyspareunia, dysuria, enuresis, flank pain, frequency, genital sores, hematuria, menstrual problem, pelvic pain, urgency, vaginal bleeding, vaginal discharge and vaginal pain.   Musculoskeletal:  Negative for arthralgias, back pain, gait problem, joint swelling, myalgias, neck pain and neck stiffness.        Foot pain/swelling   Skin:  Negative for color change, pallor, rash and wound.   Allergic/Immunologic: Negative for environmental allergies, food allergies and immunocompromised state.   Neurological:  Negative for dizziness, tremors, seizures, syncope, facial asymmetry, speech difficulty, weakness, light-headedness, numbness and headaches.   Hematological:  Negative for adenopathy. Does not bruise/bleed easily.   Psychiatric/Behavioral:  Negative for agitation, behavioral problems, confusion, decreased concentration, dysphoric mood, hallucinations, self-injury, sleep disturbance and suicidal ideas. The patient is not nervous/anxious and is not hyperactive.    All other systems reviewed and are negative.       The patient's past medical history, past surgical history, family history, and social history have been reviewed at length in the electronic medical record.    Physical examination:  Temperature 97.6 °F (36.4 °C), temperature source Temporal, height 152.4 cm (60\"), weight 63 kg (139 lb).  Constitutional: The patient is well-nourished and pleasant. She appears her stated age. She ambulates with her walker and has a profoundly stooped posture.    HEENT: normocephalic, atraumatic, sclera clear    Dermatological: Lumbar incision is clean, dry, and without evidence of erythema or induration.  There is some eschar as the skin flap at the north pole of the incision is healing.  No tenderness or fluctuance with palpation.    Neurological Exam   A/A/C, speech clear, attention normal, conversant, answers " questions appropriately, good historian.  Motor examination does not reveal weakness in the , upper or lower extremities.   Sensation is intact.  Gait is normal, balance is normal.   No tremors are noted.    Medical Decision Making  Diagnoses and all orders for this visit:    1. Spinal stenosis of lumbar region with neurogenic claudication (Primary)    2. Lumbar disc disease    -S/P decompressive lamiectomies     Ms. Adkins is pleased with her surgical outcome and thinks Dr. Cavazos's outfit looks nice today.   She is slowly increasing her activities. We carefully reviewed signs of infection she should make our office aware of in the interim.     Any copied data from previous notes included in the (1) HPI, (2) PE, (3) MDM and/or assessment and plan has been reviewed and is accurate as of this day.    Nerissa Graham PA-C     Patient Care Team:  Savanna Juarez MD as PCP - General (Internal Medicine)  Kirk Deutsch MD as Consulting Physician (Rheumatology)  Arley Shaw APRN as Nurse Practitioner (Rheumatology)  Eliezer Cavazos MD as Surgeon (Neurosurgery)        Post-op

## 2025-03-14 ENCOUNTER — TELEPHONE (OUTPATIENT)
Age: 80
End: 2025-03-14
Payer: MEDICARE

## 2025-03-14 NOTE — TELEPHONE ENCOUNTER
"  Caller: Stacia Adkins \"John\"    Relationship: Self    Best call back number: [phone]       What was the call regarding: PATIENT HAD BACK SURGERY AND IS REPORTING EXCESSIVE SWELLING IN LEGS NAD HANDS. PATIENT STATES THAT SHE CONTACT HER PCP AND WAS PRESCRIBED A DIURETIC AND IT HASN'T DONE ANYTHING FOR HER. PLEASE CONTACT PATIENT AND ADVISE. PATIENT HAS AN APPOINTMENT SCHEDULED FOR 6/3 CURRENTLY.    "

## 2025-03-14 NOTE — TELEPHONE ENCOUNTER
I left pt a detailed message with her appt details. She is scheduled for IV Orencia 3/19 at 10am. I asked her to please call us back if she needs to change this or has any postop complications. I have a reminder set to call her again on Monday.

## 2025-03-18 ENCOUNTER — TELEPHONE (OUTPATIENT)
Age: 80
End: 2025-03-18
Payer: MEDICARE

## 2025-03-18 NOTE — TELEPHONE ENCOUNTER
See message, I finally reached patient today regarding swelling. She states that her feet, legs and hands have been swollen and that PCP prescribed 14 days of a diuretic. She states that diuretic had not been helping but swelling is finally a little bit better today and she was able to get her feet into shoes. She states that swelling in her feet is worse when she first wakes up in the morning. She had back surgery on 2/3/25 and from what I can tell she has not had IV Orencia since 10/8/24. Please advise on swelling. She does not seem to think it was her joints swelling due to RA.

## 2025-03-18 NOTE — TELEPHONE ENCOUNTER
I spoke with Anika Garcia regarding this patient's free drug. Per her, patient no longer will qualify for free drug this year because once she meets her deductible her medication will be paid for. Per Anika she still has $4000 to meet her deductible. She stated patient may qualify for the hospital financial aid and she can assist her in signing up for this. I left patient timmy  with these details. I also asked her to return our call regarding the swelling in her legs and hands. She is currently scheduled for an infusion tomorrow so we wanted to discuss this with her prior.

## 2025-03-19 ENCOUNTER — HOSPITAL ENCOUNTER (OUTPATIENT)
Facility: HOSPITAL | Age: 80
Discharge: HOME | End: 2025-03-19
Payer: MEDICARE

## 2025-03-21 ENCOUNTER — TELEPHONE (OUTPATIENT)
Age: 80
End: 2025-03-21
Payer: MEDICARE

## 2025-03-21 DIAGNOSIS — M06.9 RHEUMATOID ARTHRITIS INVOLVING MULTIPLE SITES, UNSPECIFIED WHETHER RHEUMATOID FACTOR PRESENT: Primary | ICD-10-CM

## 2025-03-21 NOTE — TELEPHONE ENCOUNTER
"I called and spoke with patient to ensure she was having no post-op complications or infections. She stated she was having some spinal leakage after the surgery and that it has continued since. She saw RICHA Graham with neurosurgery on 2/14 and 2/26 (notes under encounters). Per their notes on 2/26, \"Lumbar incision is clean, dry, and without evidence of erythema or induration.  There is some eschar as the skin flap at the north pole of the incision is healing.  No tenderness or fluctuance with palpation.\" She currently continues to deny all signs or symptoms of infection although she states there is still some spinal leakage (clear per patient). She is also complaining of leg and foot pain that is noted by neurosurgery and she saw PCP for this as well. She states the 14 day course of diuretics did not help and compression socks are not helping either. She wanted me to let you know all of this and see if you had any further recommendations/ ensure it is okay from your perspective to resume infusions. Thank you so much.  "

## 2025-03-21 NOTE — TELEPHONE ENCOUNTER
Spoke with patient and gave ADB's recommendations. She states that she does not take NSAIDs and she has compression stockings that she is going to try wearing tonight. She states her PCP is on leave right now and there is an NP working in her place at this time. I advised that she call their office to advise her further on edema/diuretic. She expressed understanding.

## 2025-03-24 RX ORDER — LORATADINE 10 MG/1
10 TABLET ORAL ONCE
Status: CANCELLED
Start: 2025-03-26

## 2025-03-24 RX ORDER — FAMOTIDINE 20 MG/1
20 TABLET, FILM COATED ORAL AS NEEDED
Status: CANCELLED
Start: 2025-03-26

## 2025-03-24 RX ORDER — ACETAMINOPHEN 325 MG/1
650 TABLET ORAL ONCE
Status: CANCELLED
Start: 2025-03-26

## 2025-03-24 RX ORDER — METHYLPREDNISOLONE SODIUM SUCCINATE 125 MG/2ML
125 INJECTION INTRAMUSCULAR; INTRAVENOUS AS NEEDED
Status: CANCELLED
Start: 2025-03-26

## 2025-03-24 RX ORDER — SODIUM CHLORIDE 9 MG/ML
20 INJECTION, SOLUTION INTRAVENOUS ONCE
Status: CANCELLED | OUTPATIENT
Start: 2025-03-26

## 2025-03-24 RX ORDER — DIPHENHYDRAMINE HYDROCHLORIDE 50 MG/ML
50 INJECTION, SOLUTION INTRAMUSCULAR; INTRAVENOUS AS NEEDED
Status: CANCELLED
Start: 2025-03-26

## 2025-03-25 ENCOUNTER — TELEPHONE (OUTPATIENT)
Age: 80
End: 2025-03-25
Payer: MEDICARE

## 2025-03-25 NOTE — TELEPHONE ENCOUNTER
Good morning,    I am reaching out from Dr. Deutsch's office with Rheumatology. Our mutual patient had surgery with Dr. Cavazos on 2/3/25 and followed up with your office on 2/14/25 and 2/26/25. This patient gets IV Orencia 750mg infusions every 4 weeks for her Rheumatoid Arthritis and is scheduled to resume these tomorrow. Dr. Deutsch just wanted me to make sure from your office's perspective that the patient is okay to resume treatments post-op. Thank you so much!

## 2025-03-25 NOTE — TELEPHONE ENCOUNTER
Spoke with patient and let her know she is cleared to resume IV Orencia as long as no infection, which she still denies. I reiterated that she needs to reach out to Anika Garcia at [phone] regarding infusion assistance.

## 2025-03-26 ENCOUNTER — TELEPHONE (OUTPATIENT)
Age: 80
End: 2025-03-26
Payer: MEDICARE

## 2025-03-26 ENCOUNTER — HOSPITAL ENCOUNTER (OUTPATIENT)
Facility: HOSPITAL | Age: 80
Discharge: HOME | End: 2025-03-26
Payer: MEDICARE

## 2025-03-26 VITALS
HEIGHT: 60 IN | BODY MASS INDEX: 27.96 KG/M2 | TEMPERATURE: 98.9 F | HEART RATE: 93 BPM | WEIGHT: 142.4 LBS | SYSTOLIC BLOOD PRESSURE: 109 MMHG | DIASTOLIC BLOOD PRESSURE: 71 MMHG | RESPIRATION RATE: 16 BRPM

## 2025-03-26 DIAGNOSIS — M05.79 RHEUMATOID ARTHRITIS WITH RHEUMATOID FACTOR OF MULTIPLE SITES WITHOUT ORGAN OR SYSTEMS INVOLVEMENT: Primary | ICD-10-CM

## 2025-03-26 LAB
ANION GAP SERPL CALCULATED.3IONS-SCNC: 13.5 MMOL/L (ref 5–15)
BUN SERPL-MCNC: 27 MG/DL (ref 8–23)
BUN/CREAT SERPL: 22.1 (ref 7–25)
CALCIUM SPEC-SCNC: 9.1 MG/DL (ref 8.6–10.5)
CHLORIDE SERPL-SCNC: 101 MMOL/L (ref 98–107)
CO2 SERPL-SCNC: 24.5 MMOL/L (ref 22–29)
CREAT SERPL-MCNC: 1.22 MG/DL (ref 0.57–1)
EGFRCR SERPLBLD CKD-EPI 2021: 45.2 ML/MIN/1.73
GLUCOSE SERPL-MCNC: 108 MG/DL (ref 65–99)
POTASSIUM SERPL-SCNC: 4.4 MMOL/L (ref 3.5–5.2)
SODIUM SERPL-SCNC: 139 MMOL/L (ref 136–145)

## 2025-03-26 PROCEDURE — 96365 THER/PROPH/DIAG IV INF INIT: CPT

## 2025-03-26 PROCEDURE — 25810000003 SODIUM CHLORIDE 0.9 % SOLUTION: Performed by: INTERNAL MEDICINE

## 2025-03-26 PROCEDURE — 80048 BASIC METABOLIC PNL TOTAL CA: CPT

## 2025-03-26 PROCEDURE — 25010000002 ABATACEPT PER 10 MG: Performed by: INTERNAL MEDICINE

## 2025-03-26 RX ORDER — FAMOTIDINE 20 MG/1
20 TABLET, FILM COATED ORAL AS NEEDED
Start: 2025-04-23

## 2025-03-26 RX ORDER — SODIUM CHLORIDE 9 MG/ML
20 INJECTION, SOLUTION INTRAVENOUS ONCE
OUTPATIENT
Start: 2025-04-23

## 2025-03-26 RX ORDER — METHYLPREDNISOLONE SODIUM SUCCINATE 125 MG/2ML
125 INJECTION INTRAMUSCULAR; INTRAVENOUS AS NEEDED
Start: 2025-04-23

## 2025-03-26 RX ORDER — FAMOTIDINE 20 MG/1
20 TABLET, FILM COATED ORAL AS NEEDED
Status: DISCONTINUED | OUTPATIENT
Start: 2025-03-26 | End: 2025-03-27 | Stop reason: HOSPADM

## 2025-03-26 RX ORDER — ACETAMINOPHEN 325 MG/1
650 TABLET ORAL ONCE
Status: DISCONTINUED | OUTPATIENT
Start: 2025-03-26 | End: 2025-03-27 | Stop reason: HOSPADM

## 2025-03-26 RX ORDER — CETIRIZINE HYDROCHLORIDE 10 MG/1
10 TABLET ORAL ONCE
Status: DISCONTINUED | OUTPATIENT
Start: 2025-03-26 | End: 2025-03-27 | Stop reason: HOSPADM

## 2025-03-26 RX ORDER — LORATADINE 10 MG/1
10 TABLET ORAL ONCE
Start: 2025-04-23

## 2025-03-26 RX ORDER — ACETAMINOPHEN 325 MG/1
650 TABLET ORAL ONCE
Start: 2025-04-23

## 2025-03-26 RX ORDER — METHYLPREDNISOLONE SODIUM SUCCINATE 125 MG/2ML
125 INJECTION INTRAMUSCULAR; INTRAVENOUS AS NEEDED
Status: DISCONTINUED | OUTPATIENT
Start: 2025-03-26 | End: 2025-03-27 | Stop reason: HOSPADM

## 2025-03-26 RX ORDER — DIPHENHYDRAMINE HYDROCHLORIDE 50 MG/ML
50 INJECTION, SOLUTION INTRAMUSCULAR; INTRAVENOUS AS NEEDED
Status: DISCONTINUED | OUTPATIENT
Start: 2025-03-26 | End: 2025-03-27 | Stop reason: HOSPADM

## 2025-03-26 RX ORDER — DIPHENHYDRAMINE HYDROCHLORIDE 50 MG/ML
50 INJECTION, SOLUTION INTRAMUSCULAR; INTRAVENOUS AS NEEDED
Start: 2025-04-23

## 2025-03-26 RX ORDER — SODIUM CHLORIDE 9 MG/ML
20 INJECTION, SOLUTION INTRAVENOUS ONCE
Status: COMPLETED | OUTPATIENT
Start: 2025-03-26 | End: 2025-03-26

## 2025-03-26 RX ADMIN — SODIUM CHLORIDE 20 ML/HR: 9 INJECTION, SOLUTION INTRAVENOUS at 11:26

## 2025-03-26 RX ADMIN — SODIUM CHLORIDE 750 MG: 9 INJECTION, SOLUTION INTRAVENOUS at 11:58

## 2025-03-26 NOTE — TELEPHONE ENCOUNTER
I spoke with patient and recommended she follow up with PCP. She was upset and stated her PCP is on a 4 month leave and that she does not trust the provider taking her place. I advised that if she will not follow up with them that she could see if cardiology will see her regarding this issue. She states she could do that.

## 2025-03-26 NOTE — TELEPHONE ENCOUNTER
Patient called from downstairs in infusion to let us know her legs are still really bothering her. She states she had to turn down her compression socks because they were hurting her legs even though they help her feet. She states they keep her up at night sometimes and she worries she may be developing neuropathy. We did advise patient to reach back out to PCP as they initially gave her a diuretic for this issue and she stated this did not help, but patient did not reach back out to them at this time. She wanted me to see if you have any suggestions regarding this. Thank you so much.

## 2025-04-02 ENCOUNTER — OFFICE VISIT (OUTPATIENT)
Dept: NEUROSURGERY | Facility: CLINIC | Age: 80
End: 2025-04-02
Payer: MEDICARE

## 2025-04-02 VITALS — HEIGHT: 60 IN | BODY MASS INDEX: 27.48 KG/M2 | TEMPERATURE: 97.1 F | WEIGHT: 140 LBS

## 2025-04-02 DIAGNOSIS — Z98.890 S/P LUMBAR LAMINECTOMY: Primary | ICD-10-CM

## 2025-04-02 DIAGNOSIS — M51.9 LUMBAR DISC DISEASE: ICD-10-CM

## 2025-04-02 DIAGNOSIS — M48.062 SPINAL STENOSIS OF LUMBAR REGION WITH NEUROGENIC CLAUDICATION: ICD-10-CM

## 2025-04-02 RX ORDER — POTASSIUM CHLORIDE 750 MG/1
10 TABLET, EXTENDED RELEASE ORAL EVERY 24 HOURS
COMMUNITY
Start: 2025-02-27

## 2025-04-02 RX ORDER — BUMETANIDE 1 MG/1
1 TABLET ORAL EVERY 24 HOURS
COMMUNITY
Start: 2025-02-27

## 2025-04-02 RX ORDER — FERROUS SULFATE 325(65) MG
TABLET ORAL
COMMUNITY
Start: 2025-02-27

## 2025-04-02 RX ORDER — HYDROCODONE BITARTRATE AND ACETAMINOPHEN 10; 325 MG/1; MG/1
TABLET ORAL
COMMUNITY
Start: 2025-03-07

## 2025-04-02 NOTE — PROGRESS NOTES
Patient: Stacia Adkins  : 1945    Primary Care Provider: Savanna Juarez MD    Requesting Provider: As above        History    Chief Complaint: Low back and lower extremity pain with walking and standing intolerance.    History of Present Illness: Ms. Adkins is a pleasant 79 y.o. female that presented with progressive back and right lower extremity pain with walking and standing intolerance. Studies demonstrated severe stenosis with nerve root redundancy at L2-3, L3-4, and L4-5. She consequently underwent decompressive laminectomies at these levels by Dr. Cavazos on 2/3/2025. Surgery was complicated by a small dural rent that was successfully closed intraoperatively. The patient had an uneventful hospital stay and elected to forego inpatient rehab.  She complains of swelling in her legs but she is having very little in the way of back or leg pain.  She is able to ambulate much longer distances.  She is pleased with her progress.  She does have congestive heart failure.  Apparently she did receive some additional diuretic but that did not help with her leg swelling.    Review of Systems   Constitutional:  Negative for activity change, appetite change, chills, diaphoresis, fatigue, fever and unexpected weight change.   HENT:  Negative for congestion, dental problem, drooling, ear discharge, ear pain, facial swelling, hearing loss, mouth sores, nosebleeds, postnasal drip, rhinorrhea, sinus pressure, sinus pain, sneezing, sore throat, tinnitus, trouble swallowing and voice change.    Eyes:  Negative for photophobia, pain, discharge, redness, itching and visual disturbance.   Respiratory:  Negative for apnea, cough, choking, chest tightness, shortness of breath, wheezing and stridor.    Cardiovascular:  Negative for chest pain, palpitations and leg swelling.   Gastrointestinal:  Negative for abdominal distention, abdominal pain, anal bleeding, blood in stool, constipation, diarrhea, nausea, rectal pain and  "vomiting.   Endocrine: Negative for cold intolerance, heat intolerance, polydipsia, polyphagia and polyuria.   Genitourinary:  Negative for decreased urine volume, difficulty urinating, dyspareunia, dysuria, enuresis, flank pain, frequency, genital sores, hematuria, menstrual problem, pelvic pain, urgency, vaginal bleeding, vaginal discharge and vaginal pain.   Musculoskeletal:  Negative for arthralgias, back pain, gait problem, joint swelling, myalgias, neck pain and neck stiffness.   Skin:  Negative for color change, pallor, rash and wound.   Allergic/Immunologic: Negative for environmental allergies, food allergies and immunocompromised state.   Neurological:  Negative for dizziness, tremors, seizures, syncope, facial asymmetry, speech difficulty, weakness, light-headedness, numbness and headaches.   Hematological:  Negative for adenopathy. Does not bruise/bleed easily.   Psychiatric/Behavioral:  Negative for agitation, behavioral problems, confusion, decreased concentration, dysphoric mood, hallucinations, self-injury, sleep disturbance and suicidal ideas. The patient is not nervous/anxious and is not hyperactive.      The patient's past medical history, past surgical history, family history, and social history have been reviewed at length in the electronic medical record.      Physical Exam:   Ht 152.4 cm (60\")   Wt 63.5 kg (140 lb)   BMI 27.34 kg/m²   Lumbar incision looks great.    Medical Decision Making    Diagnosis:   Lumbar stenosis status post decompressive laminectomies.    Treatment Options:   Ms. Adkins is doing well.  She will steadily increase her activity.  She will follow-up in our clinic in approximately 2.5 months for what may be a final visit.          I, Dr. Cavazos, personally performed the services described in the documentation, as scribed in my presence, and it is both accurate and complete.  "

## 2025-04-23 ENCOUNTER — NURSE TRIAGE (OUTPATIENT)
Dept: CALL CENTER | Facility: HOSPITAL | Age: 80
End: 2025-04-23
Payer: MEDICARE

## 2025-04-23 ENCOUNTER — HOSPITAL ENCOUNTER (OUTPATIENT)
Facility: HOSPITAL | Age: 80
Discharge: HOME | End: 2025-04-23
Payer: MEDICARE

## 2025-04-23 VITALS
DIASTOLIC BLOOD PRESSURE: 73 MMHG | TEMPERATURE: 97.7 F | HEART RATE: 91 BPM | HEIGHT: 60 IN | SYSTOLIC BLOOD PRESSURE: 130 MMHG | BODY MASS INDEX: 28.47 KG/M2 | WEIGHT: 145 LBS | RESPIRATION RATE: 18 BRPM

## 2025-04-23 DIAGNOSIS — M05.79 RHEUMATOID ARTHRITIS WITH RHEUMATOID FACTOR OF MULTIPLE SITES WITHOUT ORGAN OR SYSTEMS INVOLVEMENT: Primary | ICD-10-CM

## 2025-04-23 PROCEDURE — 25010000002 ABATACEPT PER 10 MG: Performed by: INTERNAL MEDICINE

## 2025-04-23 PROCEDURE — 96365 THER/PROPH/DIAG IV INF INIT: CPT

## 2025-04-23 RX ORDER — ACETAMINOPHEN 325 MG/1
650 TABLET ORAL ONCE
Status: DISCONTINUED | OUTPATIENT
Start: 2025-04-23 | End: 2025-04-24 | Stop reason: HOSPADM

## 2025-04-23 RX ORDER — LORATADINE 10 MG/1
10 TABLET ORAL ONCE
Start: 2025-05-21

## 2025-04-23 RX ORDER — CETIRIZINE HYDROCHLORIDE 10 MG/1
10 TABLET ORAL ONCE
Status: DISCONTINUED | OUTPATIENT
Start: 2025-04-23 | End: 2025-04-24 | Stop reason: HOSPADM

## 2025-04-23 RX ORDER — FAMOTIDINE 20 MG/1
20 TABLET, FILM COATED ORAL AS NEEDED
Start: 2025-05-21

## 2025-04-23 RX ORDER — SODIUM CHLORIDE 9 MG/ML
20 INJECTION, SOLUTION INTRAVENOUS ONCE
Status: DISCONTINUED | OUTPATIENT
Start: 2025-04-23 | End: 2025-04-24 | Stop reason: HOSPADM

## 2025-04-23 RX ORDER — GABAPENTIN 100 MG/1
CAPSULE ORAL
COMMUNITY
Start: 2025-04-21

## 2025-04-23 RX ORDER — SODIUM CHLORIDE 9 MG/ML
20 INJECTION, SOLUTION INTRAVENOUS ONCE
OUTPATIENT
Start: 2025-05-21

## 2025-04-23 RX ORDER — METHYLPREDNISOLONE SODIUM SUCCINATE 125 MG/2ML
125 INJECTION INTRAMUSCULAR; INTRAVENOUS AS NEEDED
Status: DISCONTINUED | OUTPATIENT
Start: 2025-04-23 | End: 2025-04-24 | Stop reason: HOSPADM

## 2025-04-23 RX ORDER — FAMOTIDINE 20 MG/1
20 TABLET, FILM COATED ORAL AS NEEDED
Status: DISCONTINUED | OUTPATIENT
Start: 2025-04-23 | End: 2025-04-24 | Stop reason: HOSPADM

## 2025-04-23 RX ORDER — DIPHENHYDRAMINE HYDROCHLORIDE 50 MG/ML
50 INJECTION, SOLUTION INTRAMUSCULAR; INTRAVENOUS AS NEEDED
Status: DISCONTINUED | OUTPATIENT
Start: 2025-04-23 | End: 2025-04-24 | Stop reason: HOSPADM

## 2025-04-23 RX ORDER — METHYLPREDNISOLONE SODIUM SUCCINATE 125 MG/2ML
125 INJECTION INTRAMUSCULAR; INTRAVENOUS AS NEEDED
Start: 2025-05-21

## 2025-04-23 RX ORDER — DIPHENHYDRAMINE HYDROCHLORIDE 50 MG/ML
50 INJECTION, SOLUTION INTRAMUSCULAR; INTRAVENOUS AS NEEDED
Start: 2025-05-21

## 2025-04-23 RX ORDER — ACETAMINOPHEN 325 MG/1
650 TABLET ORAL ONCE
Start: 2025-05-21

## 2025-04-23 RX ADMIN — SODIUM CHLORIDE 750 MG: 9 INJECTION, SOLUTION INTRAVENOUS at 12:10

## 2025-04-24 NOTE — TELEPHONE ENCOUNTER
Caller states started on Gabapentin and told not to take her pain medication. Caller states but I need my pain medication I've been on it for year's. Caller states if given that instruction would need to speak with provider. Caller has number for a pharmacy but was advised if given specific instructions on her medication would need to speak with provider on call and instructed on how to do so.         Reason for Disposition   [1] Caller has URGENT medicine question about med that PCP or specialist prescribed AND [2] triager unable to answer question    Additional Information   Negative: [1] Intentional drug overdose AND [2] suicidal thoughts or ideas   Negative: Drug overdose and triager unable to answer question   Negative: Caller requesting a renewal or refill of a medicine patient is currently taking   Negative: Caller requesting information unrelated to medicine   Negative: Caller requesting information about COVID-19 Vaccine   Negative: Caller requesting information about Emergency Contraception   Negative: Caller requesting information about Combined Birth Control Pills   Negative: Caller requesting information about Progestin Birth Control Pills   Negative: Caller requesting information about Post-Op pain or medicines   Negative: Caller requesting a prescription antibiotic (such as Penicillin) for Strep throat and has a positive culture result   Negative: Caller requesting a prescription anti-viral med (such as Tamiflu) and has influenza (flu) symptoms   Negative: Immunization reaction suspected   Negative: Rash while taking a medicine or within 3 days of stopping it   Negative: [1] Asthma and [2] having symptoms of asthma (cough, wheezing, etc.)   Negative: [1] Symptom of illness (e.g., headache, abdominal pain, earache, vomiting) AND [2] more than mild   Negative: Breastfeeding questions about mother's medicines and diet   Negative: MORE THAN A DOUBLE DOSE of a prescription or over-the-counter (OTC) drug    "Negative: [1] DOUBLE DOSE (an extra dose or lesser amount) of prescription drug AND [2] any symptoms (e.g., dizziness, nausea, pain, sleepiness)   Negative: [1] DOUBLE DOSE (an extra dose or lesser amount) of over-the-counter (OTC) drug AND [2] any symptoms (e.g., dizziness, nausea, pain, sleepiness)   Negative: Took another person's prescription drug   Negative: [1] DOUBLE DOSE (an extra dose or lesser amount) of prescription drug AND [2] NO symptoms  (Exception: A double dose of antibiotics.)   Negative: Diabetes drug error or overdose (e.g., took wrong type of insulin or took extra dose)   Negative: [1] Prescription not at pharmacy AND [2] was prescribed by PCP recently (Exception: Triager has access to EMR and prescription is recorded there. Go to Home Care and confirm for pharmacy.)   Negative: [1] Pharmacy calling with prescription question AND [2] triager unable to answer question    Answer Assessment - Initial Assessment Questions  1. NAME of MEDICINE: \"What medicine(s) are you calling about?\"      Neurontin   2. QUESTION: \"What is your question?\" (e.g., double dose of medicine, side effect)      Can I take with my pain medication   3. PRESCRIBER: \"Who prescribed the medicine?\" Reason: if prescribed by specialist, call should be referred to that group.        4. SYMPTOMS: \"Do you have any symptoms?\" If Yes, ask: \"What symptoms are you having?\"  \"How bad are the symptoms (e.g., mild, moderate, severe)      States was prescribed new medication   5. PREGNANCY:  \"Is there any chance that you are pregnant?\" \"When was your last menstrual period?\"    Protocols used: Medication Question Call-ADULT-AH    "

## 2025-04-25 ENCOUNTER — TELEPHONE (OUTPATIENT)
Dept: INTERNAL MEDICINE | Age: 80
End: 2025-04-25

## 2025-04-25 NOTE — TELEPHONE ENCOUNTER
Caller: VONNIE ARIAS    Relationship to Patient: Other    Phone Number: [phone]     Reason for Call: INQUIRING ABOUT IF OFFICE CAN DO WOUND CARE

## 2025-05-19 ENCOUNTER — TELEPHONE (OUTPATIENT)
Age: 80
End: 2025-05-19
Payer: MEDICARE

## 2025-05-19 NOTE — TELEPHONE ENCOUNTER
Patient gets infusions here at [facility]. I am not sure who referred her to [facility]/ for what service.

## 2025-05-21 ENCOUNTER — HOSPITAL ENCOUNTER (OUTPATIENT)
Facility: HOSPITAL | Age: 80
Discharge: HOME | End: 2025-05-21
Payer: MEDICARE

## 2025-05-27 ENCOUNTER — TELEPHONE (OUTPATIENT)
Age: 80
End: 2025-05-27
Payer: MEDICARE

## 2025-05-27 NOTE — TELEPHONE ENCOUNTER
Spoke with patient and provided her with the [facility] scheduling line to get this appointment rescheduled.

## 2025-05-27 NOTE — TELEPHONE ENCOUNTER
"      Hub staff attempted to follow warm transfer process and was unsuccessful     Caller: Stacia Adkins \"John\"    Relationship to patient: Self    Best call back number: [phone]      Patient is needing: PT HAD AN APPT 5-30 AT 11:30, WOULD LIKE A LATER TIME. PLS CALL PT TO DISCUSS.         "

## 2025-05-30 ENCOUNTER — HOSPITAL ENCOUNTER (OUTPATIENT)
Facility: HOSPITAL | Age: 80
Discharge: HOME | End: 2025-05-30
Payer: MEDICARE

## 2025-05-30 VITALS
HEART RATE: 80 BPM | SYSTOLIC BLOOD PRESSURE: 112 MMHG | DIASTOLIC BLOOD PRESSURE: 62 MMHG | TEMPERATURE: 99 F | RESPIRATION RATE: 18 BRPM | WEIGHT: 139.2 LBS | BODY MASS INDEX: 27.19 KG/M2

## 2025-05-30 DIAGNOSIS — M05.79 RHEUMATOID ARTHRITIS WITH RHEUMATOID FACTOR OF MULTIPLE SITES WITHOUT ORGAN OR SYSTEMS INVOLVEMENT: Primary | ICD-10-CM

## 2025-05-30 PROCEDURE — 25810000003 SODIUM CHLORIDE 0.9 % SOLUTION: Performed by: INTERNAL MEDICINE

## 2025-05-30 PROCEDURE — 96365 THER/PROPH/DIAG IV INF INIT: CPT

## 2025-05-30 PROCEDURE — 25010000002 ABATACEPT PER 10 MG: Performed by: INTERNAL MEDICINE

## 2025-05-30 RX ORDER — METHYLPREDNISOLONE SODIUM SUCCINATE 125 MG/2ML
125 INJECTION INTRAMUSCULAR; INTRAVENOUS AS NEEDED
Start: 2025-06-20

## 2025-05-30 RX ORDER — CETIRIZINE HYDROCHLORIDE 10 MG/1
10 TABLET ORAL ONCE
Status: DISCONTINUED | OUTPATIENT
Start: 2025-05-30 | End: 2025-05-31 | Stop reason: HOSPADM

## 2025-05-30 RX ORDER — FAMOTIDINE 20 MG/1
20 TABLET, FILM COATED ORAL AS NEEDED
Start: 2025-06-20

## 2025-05-30 RX ORDER — DIPHENHYDRAMINE HYDROCHLORIDE 50 MG/ML
50 INJECTION, SOLUTION INTRAMUSCULAR; INTRAVENOUS AS NEEDED
Start: 2025-06-20

## 2025-05-30 RX ORDER — METHYLPREDNISOLONE SODIUM SUCCINATE 125 MG/2ML
125 INJECTION INTRAMUSCULAR; INTRAVENOUS AS NEEDED
Status: DISCONTINUED | OUTPATIENT
Start: 2025-05-30 | End: 2025-05-31 | Stop reason: HOSPADM

## 2025-05-30 RX ORDER — ACETAMINOPHEN 325 MG/1
650 TABLET ORAL ONCE
Status: DISCONTINUED | OUTPATIENT
Start: 2025-05-30 | End: 2025-05-31 | Stop reason: HOSPADM

## 2025-05-30 RX ORDER — FAMOTIDINE 20 MG/1
20 TABLET, FILM COATED ORAL AS NEEDED
Status: DISCONTINUED | OUTPATIENT
Start: 2025-05-30 | End: 2025-05-31 | Stop reason: HOSPADM

## 2025-05-30 RX ORDER — ACETAMINOPHEN 325 MG/1
650 TABLET ORAL ONCE
Start: 2025-06-20

## 2025-05-30 RX ORDER — SODIUM CHLORIDE 9 MG/ML
20 INJECTION, SOLUTION INTRAVENOUS ONCE
OUTPATIENT
Start: 2025-06-20

## 2025-05-30 RX ORDER — SODIUM CHLORIDE 9 MG/ML
20 INJECTION, SOLUTION INTRAVENOUS ONCE
Status: COMPLETED | OUTPATIENT
Start: 2025-05-30 | End: 2025-05-30

## 2025-05-30 RX ORDER — DIPHENHYDRAMINE HYDROCHLORIDE 50 MG/ML
50 INJECTION, SOLUTION INTRAMUSCULAR; INTRAVENOUS AS NEEDED
Status: DISCONTINUED | OUTPATIENT
Start: 2025-05-30 | End: 2025-05-31 | Stop reason: HOSPADM

## 2025-05-30 RX ORDER — LORATADINE 10 MG/1
10 TABLET ORAL ONCE
Start: 2025-06-20

## 2025-05-30 RX ADMIN — SODIUM CHLORIDE 750 MG: 9 INJECTION, SOLUTION INTRAVENOUS at 14:40

## 2025-05-30 RX ADMIN — SODIUM CHLORIDE 20 ML/HR: 9 INJECTION, SOLUTION INTRAVENOUS at 14:28

## 2025-06-03 ENCOUNTER — OFFICE VISIT (OUTPATIENT)
Age: 80
End: 2025-06-03
Payer: MEDICARE

## 2025-06-03 ENCOUNTER — TELEPHONE (OUTPATIENT)
Age: 80
End: 2025-06-03

## 2025-06-03 VITALS
BODY MASS INDEX: 26.7 KG/M2 | HEART RATE: 94 BPM | HEIGHT: 60 IN | SYSTOLIC BLOOD PRESSURE: 118 MMHG | DIASTOLIC BLOOD PRESSURE: 72 MMHG | WEIGHT: 136 LBS | TEMPERATURE: 97.7 F

## 2025-06-03 DIAGNOSIS — M06.9 RHEUMATOID ARTHRITIS INVOLVING MULTIPLE SITES, UNSPECIFIED WHETHER RHEUMATOID FACTOR PRESENT: Primary | ICD-10-CM

## 2025-06-03 DIAGNOSIS — Z79.899 HIGH RISK MEDICATION USE: ICD-10-CM

## 2025-06-03 RX ORDER — PREGABALIN 25 MG/1
25 CAPSULE ORAL 2 TIMES DAILY
COMMUNITY

## 2025-06-03 RX ORDER — METHOTREXATE 2.5 MG/1
17.5 TABLET ORAL WEEKLY
Start: 2025-06-03

## 2025-06-03 RX ORDER — ALENDRONATE SODIUM 35 MG/1
35 TABLET ORAL
Qty: 4 TABLET | Refills: 5 | Status: SHIPPED | OUTPATIENT
Start: 2025-06-03

## 2025-06-03 NOTE — TELEPHONE ENCOUNTER
Cancelled remaining Orencia appt on the books here at [facility] [place] and reached out to [facility] to let them know to cancel further Prolia at their office.

## 2025-06-03 NOTE — PROGRESS NOTES
Office Follow Up      Date: 06/03/2025   Patient Name: Stacia Adkins  MRN: [id number]  YOB: 1945    Referring Physician: No ref. provider found     Chief Complaint:   Chief Complaint   Patient presents with    Rheumatoid Arthritis       History of Present Illness: Stacia Adkins is a 79 y.o. female with osteoarthritis and fibromyalgia who is here today for follow up on rheumatoid arthritis      History:  We have prescribed methotrexate and IV Orencia for her.  She finds these effective.     She has chronic osteoarthritis pain in her low back and shoulders.   She has pain from generalized osteoarthritis in the knees shoulders, hands and lumbar spine/cervical spine  She also has fibromyalgia. She continues on duloxetine and pregabalin per pain management Dr. Ruvalcaba     She avoids NSAIDs with chronic kidney disease, cardiovascular disease, and hypertension.  She has been intolerant to gabapentin historically  She is following with Dr Rodgers for chronic pain and is on hydrocodone PRN.  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   She is s/p MILD procedure on her lumbar spine through Dr. Rodgers.  She has considered having a right shoulder replacement with [facility] but has decided against this.     She started Prolia for her osteoporosis 3/27/23. She tolerates this well. No recent or upcoming dental work. She previously took Fosamax from 7942-5405 but had worsening findings on her DEXA so she was switched to Prolia.    Prolia has become prohibitively expensive for her     She had episode of rhabdo related to getting stuck in bed trying to get [employer] in early December 2018.  She was hospitalized 12/13/18-12/15/18 for weakness and rhabdo. CPK was normal at discharge.  No recurrence  History of of myocardial infarction and AVR. She follows with  cardiology.     She had lumbar decompressive surgery 2/3/2025 with Dr. Cavazos.      Interim 6/3/2025: Back pain is much improved after lumbar surgery with Dr. Cavazos.  However since then she has struggled with lower extremity edema and poorly healing venous stasis ulcerations around her ankles and shins.  Following with wound care.  Has resumed methotrexate and IV Orencia.  Prolia has become too expensive for her.  Just received Prolia injection  She is constipated.  She has terrible cramps in her legs frequently.  History of Present Illness        Subjective       Review of Systems: Review of Systems   Constitutional:  Positive for appetite change and fatigue. Negative for chills, fever and unexpected weight loss.   HENT:  Positive for dental problem, hearing loss, mouth sores and trouble swallowing. Negative for sinus pressure and sore throat.    Eyes:  Positive for itching. Negative for pain and redness.   Respiratory:  Positive for shortness of breath. Negative for cough.    Cardiovascular:  Positive for chest pain and leg swelling.   Gastrointestinal:  Positive for constipation. Negative for abdominal pain, blood in stool, diarrhea, nausea, vomiting and GERD.   Endocrine: Positive for cold intolerance, heat intolerance and polydipsia. Negative for polyuria.   Genitourinary:  Negative for dysuria, genital sores and hematuria.   Musculoskeletal:  Positive for arthralgias, back pain, gait problem, joint swelling, myalgias, neck pain and neck stiffness.   Skin:  Positive for dry skin, skin lesions and wound. Negative for rash and bruise.   Neurological:  Positive for numbness and memory problem. Negative for seizures and weakness.   Hematological:  Negative for adenopathy. Does not bruise/bleed easily.   Psychiatric/Behavioral:  Positive for stress. Negative for depressed mood. The patient is not nervous/anxious.         Past Medical History:   Past Medical History:   Diagnosis Date    Anemia     Arthritis,  "rheumatoid     Back pain     Cervical spondylosis     CHF (congestive heart failure)     Chronic venous insufficiency of lower extremity     Degenerative arthritis of lumbar spine     Dupuytrens contracture     High risk medication use     History of aortic valve replacement     Hypertension     Osteoporosis     Renal insufficiency     Scalp abrasion     left temporal and parietal region    Seropositive rheumatoid arthritis     Teeth missing     Vision loss of left eye     Vitamin D deficiency     Wears glasses        Past Surgical History:   Past Surgical History:   Procedure Laterality Date    CARDIAC VALVE SURGERY      EPIDURAL STIMULATOR INSERTION  12/2011    Thoracic epidural stimulator- Dr. Clark    EPIDURAL STIMULATOR INSERTION  06/2012    \"Lead flipped\"- Unit was replaced, complicated by MRSA infection and removed thereafter.    EPIDURAL STIMULATOR INSERTION      removed    LUMBAR LAMINECTOMY DISCECTOMY DECOMPRESSION N/A 2/3/2025    Procedure: laminectomies with medial facetectomies and foraminotomies L2-3, L3-4, and L4-5;  Surgeon: Eliezer Cavazos MD;  Location: [facility];  Service: Neurosurgery;  Laterality: N/A;    OTHER SURGICAL HISTORY  03/06/2014    Epidural stimulator wound I&D- Dr. Castro    SHOULDER SURGERY Right        Family History:   Family History   Problem Relation Age of Onset    Arthritis Father        Social History:   Social History     Socioeconomic History    Marital status:    Tobacco Use    Smoking status: Never     Passive exposure: Never    Smokeless tobacco: Never   Vaping Use    Vaping status: Never Used   Substance and Sexual Activity    Alcohol use: Never    Drug use: Never    Sexual activity: Not Currently       Medications:   Current Outpatient Medications:     citalopram (CeleXA) 20 MG tablet, , Disp: , Rfl:     DULoxetine (CYMBALTA) 60 MG capsule, Take 1 capsule by mouth Daily., Disp: 90 capsule, Rfl: 1    folic acid (FOLVITE) 1 MG tablet, Take 1 tablet by mouth " "Every Night., Disp: 90 tablet, Rfl: 1    furosemide (LASIX) 80 MG tablet, Take 1 tablet by mouth Daily., Disp: , Rfl:     HYDROcodone-acetaminophen (NORCO)  MG per tablet, , Disp: , Rfl:     methotrexate 2.5 MG tablet, Take 7 tablets by mouth 1 (One) Time Per Week. On Sundays., Disp: , Rfl:     naloxone (NARCAN) 4 MG/0.1ML nasal spray, Call 911. Don't prime. [place] in 1 nostril for overdose. Repeat in 2-3 minutes in other nostril if no or minimal breathing/responsiveness., Disp: 2 each, Rfl: 0    pregabalin (LYRICA) 25 MG capsule, Take 1 capsule by mouth 2 (Two) Times a Day., Disp: , Rfl:     traZODone (DESYREL) 100 MG tablet, Take 1.5 tablets by mouth Every Night., Disp: , Rfl:     alendronate (FOSAMAX) 35 MG tablet, Take 1 tablet by mouth Every 7 (Seven) Days., Disp: 4 tablet, Rfl: 5    Allergies:   Allergies   Allergen Reactions    Gabapentin Other (See Comments)     \"Out of body experience\"    Hydroxychloroquine Sulfate Itching    Penicillins Rash    Vancomycin Hives           Objective        Vital Signs:   Vitals:    06/03/25 1429   BP: 118/72   BP Location: Left arm   Patient Position: Sitting   Cuff Size: Adult   Pulse: 94   Temp: 97.7 °F (36.5 °C)   Weight: 61.7 kg (136 lb)   Height: 152.4 cm (60\")   PainSc: 6      Body mass index is 26.56 kg/m².      Physical Exam:  Physical Exam   MUSCULOSKELETAL:   No peripheral synovitis  [place] and Francesca's nodes present throughout the hands  Bilateral CMCs tender  Crepitus and painful minimal range of motion right shoulder  Crepitus knees.  Tender lumbar spine and cervical spine to palpation.   Widespread tender points present above and below the waist  +kyphosis spine.  Ambulates with walker, hunched over    Complete joint exam was performed including the MCPs, PIPs, DIPs of the hands, wrists, elbows, shoulders, hips, knees and ankles.  No soft tissue swelling or tenderness is present except as above.    General: Elderly cooperative, alert and oriented. " "Affect is normal. Hydration appears normal.   HEENT: Normocephalic and atraumatic. Lids and conjunctiva are normal. Pupils are equal and sclera are clear. Oropharynx is clear   NECK neck is supple without adenopathy, masses or thyromegaly.   CARDIOVASCULAR: Regular rate and rhythm. No murmurs, rubs or gallops   LUNGS: Effort is normal. Lungs are clear bilateral   ABDOMEN: Not examined  EXTREMITIES: Peripheral pulses are intact. No clubbing.   SKIN: No rashes. No subcutaneous nodules. No digital ulcers. No sclerodactyly.  Multiple lower leg venous stasis ulcerations around the ankles wrapped in bandages  NEUROLOGIC:  Strength testing is normal.  No focal neurologic deficits    Results Review:   Labs:   Lab Results   Component Value Date    GLUCOSE 108 (H) 03/26/2025    BUN 27 (H) 03/26/2025    CREATININE 1.22 (H) 03/26/2025    EGFR 45.2 (L) 03/26/2025    BCR 22.1 03/26/2025    K 4.4 03/26/2025    CO2 24.5 03/26/2025    CALCIUM 9.1 03/26/2025    ALBUMIN 4.0 01/27/2025    BILITOT 0.3 01/27/2025    AST 25 01/27/2025    ALT 10 01/27/2025     Lab Results   Component Value Date    WBC 14.77 (H) 01/27/2025    HGB 13.4 01/27/2025    HCT 39.7 01/27/2025    MCV 91.9 01/27/2025     01/27/2025     Lab Results   Component Value Date    SEDRATE 30 01/27/2025     Lab Results   Component Value Date    CRP <0.30 01/27/2025     Lab Results   Component Value Date    QUANTIFERO Incubation performed. 10/10/2024    QUANTIFERO Comment 10/10/2024    QUANTITB1 0.10 10/10/2024    QUANTITB2 0.10 10/10/2024    QUANTIFERN 0.11 10/10/2024    QUANTIFERM >10.00 10/10/2024    QUANTITBGLDP Negative 10/10/2024     No results found for: \"RF\"  Lab Results   Component Value Date    HEPBSAG Non-Reactive 10/10/2024    HEPAIGM Non-Reactive 10/10/2024    HEPBIGMCORE Non-Reactive 10/10/2024    HEPCVIRUSABY Non-Reactive 10/10/2024         Procedures    Assessment / Plan        -Rheumatoid arthritis   Patient is .  She worked as a juvenile "  in the past. Four children  Diagnosed RA 2001.    Labs 10/10/2024: Negative rheumatoid factor, negative CCP antibody  **Current:  methotrexate PO   Prior IV Orencia 1000mg every 4 weeks [facility] [place] stopped 6/25 with nonhealing venous stasis ulcers  Avoids NSAIDs with CKD  previously on Enbrel, Humira, sulfasalazine, Plaquenil, leflunomide        Low disease activity from RA standpoint on IV Orencia and MTX.    sjc 0 with several tender joints- she seems to have hyperalgesia on exam related to fibromyalgia and OA.     -She had lumbar decompressive surgery 2/3/2025 with Dr. Cavazos which has helped her back pain, but she has developed venous stasis ulcerations bilateral ankles with chronic edema for which she is now following with wound care    -Stop further Orencia to help with venous stasis lower leg wound ulceration healing  -Continue methotrexate 17.5 mg once weekly   -Try Colace and MiraLAX for constipation  -Try magnesium for leg cramps  -Labs ordered for monitoring as below CBC CMP sed rate CRP every 3 months  Follow-up 3 months     -High risk medication use  -Immunosuppression due to drug therapy   methotrexate  Hepatitis and Q TB negative 10/10/2024        Risk include but are not limited to severe liver damage so can be fatal, the possible need for liver biopsy, bone marrow suppression that can lead to dangerously low blood counts, GI side effects including mouth sores and diarrhea, fatigue, and the rare risk of severe pulmonary complications.  There should be no alcohol consumed with methotrexate.  Methotrexate can cause severe fetal abnormalities with his mother father is taking the medication and thus must be avoided if pregnancy is a possibility.  All medication is to be taken 1 day a week only.  The need for Q 8-12-week labs and the need for folic acid supplement were discussed.     -Osteoporosis  DEXA 8/13/2019 T-score -3.2 wrist  DEXA 12/29/22 with worsening osteoporosis wrist with  T-score -3.3   Prior alendronate 8/2019-12/2022  Prior Prolia started 3/27/23-6/25 (became prohibitively expensive for her)  **Restart alendronate 35 mg once weekly 8/25(2 months after last Prolia)     She tolerates Prolia well but it has become prohibitively expensive for her.   Stop further Prolia and switch to bisphosphonate alendronate 35 mg once weekly starting 2 months after last Prolia injection which will be August 2025.  Will use lower dose bisphosphonate with her renal insufficiency     Recommend weight-bearing exercise, calcium, and vitamin-D supplement.  Risks and benefits of bisphosphonates discussed in detail including but not limited to osteonecrosis jaw, atypical femoral fracture, bone death, kidney failure, hypocalcemia.  Patient has no reported jaw or tooth pain.  No femoral pain  Update DEXA in 2025     -Fibromyalgia  **Current:  Duloxetine, pregabalin per pain management  Prior intolerance gabapentin.     - Lowered duloxetine from 90 mg to 60 mg to see if this helps with her lower extremity edema  - Pregabalin prescribed by pain management  - Risks and benefits of snris discussed including nausea, constipation, potential for suicidal ideation, and increasing depression   - I have encouraged regular low impact aerobic exercise up to 30 minutes per day. If this is unattainable, recommend a graded exercise program starting with 5 minutes of dedicated cardiovascular exercise daily, increasing by 1 minute daily until goal of 30 minutes daily is reached.  - she has done PT numerous times and declines to do this further  - I have recommended conservative measures to improve sleep  - recommend avoiding narcotics studies have shown that narcotics can worsen fibromyalgia pain.   - Counseled on alternative therapies that can help with pain including massage therapy, aquatic therapy, physical therapy, acupuncture, chiropractics, and psychology evaluation     -Generalized osteoarthrosis, involving multiple  sites  Spine, knees  Avoiding NSAIDs with CKD  *Current:  Duloxetine  *Hydrocodone through Pain Management.     Continue duloxetine to help with her chronic low back pain from OA  s/p MILD procedure lumbar spine with Dr. Rodgers   Avoiding NSAIDs with chronic kidney disease, cardiovascular disease, hypertension  Continue hydrocodone, pregabalin with pain management     -Osteoarthritis of right shoulder  Advanced OA right shoulder.    Considering steroid injection through Pain Management Dr. Rodgers.  She has decided against right shoulder replacement with [facility]     -Multilevel degenerative disc disease  Intolerant gabapentin.  Avoids NSAIDs with CKD  Neurosurgery Dr Cavazos / Dr. Rodgers pain management.     Avoids NSAIDs with chronic kidney disease  On hydrocodone per pain management  Continue duloxetine  Low back pain continues to be a major pain generator along with right shoulder pain from degenerative arthritis.    Status post lumbar decompressive surgery with Dr. Cavazos 2/3/2025 which has been very helpful    -Left carpal tunnel syndrome  Stable  Recommend carpal tunnel wrist splint and follow up with Dr Martin if persistent     -History of rhabdomyolysis  Episode December 13, 2018 after her head was trapped in a bed for 12 hours reaching for presents.  Hospitalized December 13-15th..     History of.  Resolved.  CPK now normal.     -History of aortic valve replacement  Cardiology Dr. Jacobs         1. Rheumatoid arthritis involving multiple sites, unspecified whether rheumatoid factor present    2. High risk medication use        Assessment & Plan        Orders Placed This Encounter   Procedures    CBC Auto Differential    Comprehensive Metabolic Panel    C-reactive Protein    Sedimentation Rate    Comprehensive Metabolic Panel    C-reactive Protein    Sedimentation Rate    CBC & Differential     New Medications Ordered This Visit   Medications    methotrexate 2.5 MG tablet      Sig: Take 7 tablets by mouth 1 (One) Time Per Week. On Sundays.    alendronate (FOSAMAX) 35 MG tablet     Sig: Take 1 tablet by mouth Every 7 (Seven) Days.     Dispense:  4 tablet     Refill:  5     TIME SPENT: I spent 40 minutes caring for the patient on this date of service.  This time includes time spent by me in the following activities: Preparing for the visit, obtaining records, reviewing/ordering tests and independently reviewing results, performing a medically appropriate history/exam, counseling and educating the patient/family/caregiver, ordering medications, tests, or procedures, and documenting information in the medical record.    Follow Up:   Return in about 3 months (around 9/3/2025).      Discussed plan of care in detail with the patient today.  Patient verbalized understanding and agrees.    I confirm accuracy of unchanged data/findings which have been carried forward from previous visit.  I have updated appropriately those that have changed.        Kirk Deutsch MD  [facility] [place] [facility] [place]

## 2025-06-04 LAB
ALBUMIN SERPL-MCNC: 4 G/DL (ref 3.8–4.8)
ALP SERPL-CCNC: 115 IU/L (ref 44–121)
ALT SERPL-CCNC: 12 IU/L (ref 0–32)
AST SERPL-CCNC: 21 IU/L (ref 0–40)
BASOPHILS # BLD AUTO: 0 X10E3/UL (ref 0–0.2)
BASOPHILS NFR BLD AUTO: 0 %
BILIRUB SERPL-MCNC: <0.2 MG/DL (ref 0–1.2)
BUN SERPL-MCNC: 23 MG/DL (ref 8–27)
BUN/CREAT SERPL: 15 (ref 12–28)
CALCIUM SERPL-MCNC: 8.5 MG/DL (ref 8.7–10.3)
CHLORIDE SERPL-SCNC: 106 MMOL/L (ref 96–106)
CO2 SERPL-SCNC: 23 MMOL/L (ref 20–29)
CREAT SERPL-MCNC: 1.57 MG/DL (ref 0.57–1)
CRP SERPL-MCNC: 2 MG/L (ref 0–10)
EGFRCR SERPLBLD CKD-EPI 2021: 33 ML/MIN/1.73
EOSINOPHIL # BLD AUTO: 0.2 X10E3/UL (ref 0–0.4)
EOSINOPHIL NFR BLD AUTO: 2 %
ERYTHROCYTE [DISTWIDTH] IN BLOOD BY AUTOMATED COUNT: 16.9 % (ref 11.7–15.4)
ERYTHROCYTE [SEDIMENTATION RATE] IN BLOOD BY WESTERGREN METHOD: 40 MM/HR (ref 0–40)
GLOBULIN SER CALC-MCNC: 2.8 G/DL (ref 1.5–4.5)
GLUCOSE SERPL-MCNC: 97 MG/DL (ref 70–99)
HCT VFR BLD AUTO: 36.2 % (ref 34–46.6)
HGB BLD-MCNC: 11.4 G/DL (ref 11.1–15.9)
IMM GRANULOCYTES # BLD AUTO: 0 X10E3/UL (ref 0–0.1)
IMM GRANULOCYTES NFR BLD AUTO: 0 %
LYMPHOCYTES # BLD AUTO: 2.6 X10E3/UL (ref 0.7–3.1)
LYMPHOCYTES NFR BLD AUTO: 35 %
MCH RBC QN AUTO: 30 PG (ref 26.6–33)
MCHC RBC AUTO-ENTMCNC: 31.5 G/DL (ref 31.5–35.7)
MCV RBC AUTO: 95 FL (ref 79–97)
MONOCYTES # BLD AUTO: 0.8 X10E3/UL (ref 0.1–0.9)
MONOCYTES NFR BLD AUTO: 11 %
NEUTROPHILS # BLD AUTO: 3.8 X10E3/UL (ref 1.4–7)
NEUTROPHILS NFR BLD AUTO: 52 %
PLATELET # BLD AUTO: 98 X10E3/UL (ref 150–450)
POTASSIUM SERPL-SCNC: 5.1 MMOL/L (ref 3.5–5.2)
PROT SERPL-MCNC: 6.8 G/DL (ref 6–8.5)
RBC # BLD AUTO: 3.8 X10E6/UL (ref 3.77–5.28)
SODIUM SERPL-SCNC: 144 MMOL/L (ref 134–144)
WBC # BLD AUTO: 7.5 X10E3/UL (ref 3.4–10.8)

## 2025-06-11 ENCOUNTER — TELEPHONE (OUTPATIENT)
Age: 80
End: 2025-06-11
Payer: MEDICARE

## 2025-06-11 NOTE — TELEPHONE ENCOUNTER
"  Caller: Stcaia Adkins \"John\"    Relationship: Self    Best call back number: [phone]         What was the call regarding: PATIENT WOULD LIKE LAB WORK MAILED TO HER. PLEASE ADVISE WHEN SENT.    "

## 2025-06-23 ENCOUNTER — TRANSCRIBE ORDERS (OUTPATIENT)
Dept: PHYSICAL THERAPY | Facility: HOSPITAL | Age: 80
End: 2025-06-23
Payer: MEDICARE

## 2025-06-23 DIAGNOSIS — M79.89 SWELLING, LIMB: ICD-10-CM

## 2025-06-23 DIAGNOSIS — I87.2 VENOUS INSUFFICIENCY: Primary | ICD-10-CM

## 2025-07-15 ENCOUNTER — OFFICE VISIT (OUTPATIENT)
Dept: NEUROSURGERY | Facility: CLINIC | Age: 80
End: 2025-07-15
Payer: MEDICARE

## 2025-07-15 VITALS
SYSTOLIC BLOOD PRESSURE: 120 MMHG | TEMPERATURE: 97.9 F | BODY MASS INDEX: 26.84 KG/M2 | DIASTOLIC BLOOD PRESSURE: 64 MMHG | HEIGHT: 60 IN | WEIGHT: 136.7 LBS

## 2025-07-15 DIAGNOSIS — Z98.890 S/P LUMBAR LAMINECTOMY: Primary | ICD-10-CM

## 2025-07-15 DIAGNOSIS — M48.062 SPINAL STENOSIS OF LUMBAR REGION WITH NEUROGENIC CLAUDICATION: ICD-10-CM

## 2025-07-15 RX ORDER — PREGABALIN 25 MG/1
25 CAPSULE ORAL 2 TIMES DAILY
COMMUNITY

## 2025-07-15 RX ORDER — SPIRONOLACTONE 50 MG/1
TABLET, FILM COATED ORAL
COMMUNITY

## 2025-07-15 RX ORDER — DOXYCYCLINE 100 MG/1
CAPSULE ORAL
COMMUNITY
Start: 2025-06-04

## 2025-07-15 NOTE — PROGRESS NOTES
Patient: Stacia Adkins  : 1945  Chart #: [id number]    Date of Service: 07/15/2025    Chief Complaint: Low back and lower extremity pain with walking and standing intolerance     HPI: Ms. Adkins is a pleasant 79 y.o. female that presented with progressive back and right lower extremity pain with walking and standing intolerance. Studies demonstrated severe stenosis with nerve root redundancy at L2-3, L3-4, and L4-5. She consequently underwent decompressive laminectomies at these levels by Dr. Cavazos on 2/3/2025. Surgery was complicated by a small dural rent that was successfully closed intraoperatively. The patient had an uneventful hospital stay and elected to forego inpatient rehab. She has swelling in her legs, but is also suffering from some open wounds on her shins. She is in the process of being evaluated by wound care and vascular specialists. Her preoperative pain is much better. She still has low back pain when she does a lot of bending or lifting.     Review of Systems   Constitutional:  Negative for activity change, appetite change, chills, diaphoresis, fatigue, fever and unexpected weight change.   HENT:  Negative for congestion, dental problem, drooling, ear discharge, ear pain, facial swelling, hearing loss, mouth sores, nosebleeds, postnasal drip, rhinorrhea, sinus pressure, sinus pain, sneezing, sore throat, tinnitus, trouble swallowing and voice change.    Eyes:  Negative for photophobia, pain, discharge, redness, itching and visual disturbance.   Respiratory:  Negative for apnea, cough, choking, chest tightness, shortness of breath, wheezing and stridor.    Cardiovascular:  Negative for chest pain, palpitations and leg swelling.   Gastrointestinal:  Negative for abdominal distention, abdominal pain, anal bleeding, blood in stool, constipation, diarrhea, nausea, rectal pain and vomiting.   Endocrine: Negative for cold intolerance, heat intolerance, polydipsia, polyphagia and polyuria.  "  Genitourinary:  Negative for decreased urine volume, difficulty urinating, dysuria, enuresis, flank pain, frequency, genital sores, hematuria and urgency.   Musculoskeletal:  Positive for back pain. Negative for arthralgias, gait problem, joint swelling, myalgias, neck pain and neck stiffness.   Skin:  Negative for color change, pallor, rash and wound.   Allergic/Immunologic: Negative for environmental allergies, food allergies and immunocompromised state.   Neurological:  Negative for dizziness, tremors, seizures, syncope, facial asymmetry, speech difficulty, weakness, light-headedness, numbness and headaches.   Hematological:  Negative for adenopathy. Does not bruise/bleed easily.   Psychiatric/Behavioral:  Negative for agitation, behavioral problems, confusion, decreased concentration, dysphoric mood, hallucinations, self-injury, sleep disturbance and suicidal ideas. The patient is not nervous/anxious and is not hyperactive.         The patient's past medical history, past surgical history, family history, and social history have been reviewed at length in the electronic medical record.      Physical examination:  Blood pressure 120/64, temperature 97.9 °F (36.6 °C), temperature source Temporal, height 152.4 cm (60\"), weight 62 kg (136 lb 11.2 oz).  Lumbar incision is well-healed.     Constitutional: The patient is well-developed, well-nourished. She is pleasant and is in no acute distress.     HEENT: normocephalic, atraumatic, sclera clear    MSK: Straight leg testing is negative.     Neurological Exam   A/A/C, speech clear, attention normal, conversant, answers questions appropriately, good historian.  Motor examination does not reveal weakness in the , upper or lower extremities.   Sensation is intact.  Gait is normal, balance is normal.   No tremors are noted.  Reflexes are difficult to elicit throughout.     Medical Decision Making  Diagnoses and all orders for this visit:    1. S/P lumbar laminectomy " (Primary)    2. Spinal stenosis of lumbar region with neurogenic claudication    Ms. Adkins is doing well in terms of her back and claudication symptoms in her legs. She is a little sad she cannot see Dr. Cavazos today, but I explained he has a full office today. From a neurosurgical perspective, she can follow up with our office on an as-needed basis. We reviewed new or worsening symptoms that would warrant re-evaluation by neurosurgery. I am happy to see this patient back or answer and further questions.      Any copied data from previous notes included in the (1) HPI, (2) PE, (3) MDM and/or assessment and plan has been reviewed and is accurate as of this day.    Nerissa Graham PA-C     Patient Care Team:  Savanna Juarez MD as PCP - General (Internal Medicine)  Kirk Deutsch MD as Consulting Physician (Rheumatology)  Arley Shaw APRN as Nurse Practitioner (Rheumatology)  Eliezer Cavazos MD as Surgeon (Neurosurgery)

## 2025-08-13 PROBLEM — L03.115 CELLULITIS OF RIGHT FOOT: Status: ACTIVE | Noted: 2025-08-13

## 2025-08-13 PROBLEM — L03.116 CELLULITIS OF LEFT FOOT: Status: ACTIVE | Noted: 2025-08-13

## 2025-08-15 ENCOUNTER — INFUSION (OUTPATIENT)
Facility: HOSPITAL | Age: 80
End: 2025-08-15
Payer: MEDICARE

## 2025-08-15 VITALS
BODY MASS INDEX: 27.68 KG/M2 | WEIGHT: 141 LBS | HEART RATE: 71 BPM | SYSTOLIC BLOOD PRESSURE: 116 MMHG | RESPIRATION RATE: 18 BRPM | DIASTOLIC BLOOD PRESSURE: 56 MMHG | TEMPERATURE: 97.7 F | HEIGHT: 60 IN

## 2025-08-15 DIAGNOSIS — L03.116 CELLULITIS OF LEFT FOOT: Primary | ICD-10-CM

## 2025-08-15 DIAGNOSIS — L03.115 CELLULITIS OF RIGHT FOOT: ICD-10-CM

## 2025-08-15 PROCEDURE — 25010000002 DALBAVANCIN 500 MG RECONSTITUTED SOLUTION 1 EACH VIAL: Performed by: INTERNAL MEDICINE

## 2025-08-15 PROCEDURE — 96365 THER/PROPH/DIAG IV INF INIT: CPT

## 2025-08-15 PROCEDURE — 25010000003 DEXTROSE 5 % SOLUTION 500 ML FLEX CONT: Performed by: INTERNAL MEDICINE

## 2025-08-15 RX ADMIN — DEXTROSE MONOHYDRATE 1500 MG: 50 INJECTION, SOLUTION INTRAVENOUS at 15:10

## (undated) DEVICE — CONN TBG Y 5 IN 1 LF STRL

## (undated) DEVICE — STRIP,CLOSURE,WOUND,MEDI-STRIP,1/2X4: Brand: MEDLINE

## (undated) DEVICE — PATIENT RETURN ELECTRODE, SINGLE-USE, CONTACT QUALITY MONITORING, ADULT, WITH 9FT CORD, FOR PATIENTS WEIGING OVER 33LBS. (15KG): Brand: MEGADYNE

## (undated) DEVICE — SUT SILK 2/0 PS 18IN 1588H

## (undated) DEVICE — DRSNG WND GZ PAD BORDERED 4X8IN STRL

## (undated) DEVICE — STRAP POSTN KN/BDY FM 5X72IN DISP

## (undated) DEVICE — BLANKT WARM UPPR/BDY ARM/OUT 57X196CM

## (undated) DEVICE — SYR LUERLOK 30CC

## (undated) DEVICE — PK NEURO DISC 10

## (undated) DEVICE — ADHS LIQ MASTISOL 2/3ML

## (undated) DEVICE — GLV SURG PREMIERPRO MIC LTX PF SZ6.5 BRN

## (undated) DEVICE — TOOL MR8-14MH30T MR8 14CM MH SYMTRI 3MM: Brand: MIDAS REX MR8

## (undated) DEVICE — SUT PROLN 6/0 BV1 D/A 30IN 8709H

## (undated) DEVICE — GLV SURG PREMIERPRO MIC LTX PF SZ7 BRN

## (undated) DEVICE — HDRST INTUB GENTLETOUCH SLOT 7IN RT

## (undated) DEVICE — DURAGEN® PLUS DURAL REGENERATION MATRIX, 3 IN X 3 IN (7.5 CM X 7.5 CM)
Type: IMPLANTABLE DEVICE | Site: SPINE LUMBAR | Status: NON-FUNCTIONAL
Brand: DURAGEN® PLUS

## (undated) DEVICE — RESERVOIR,SUCTION,100CC,SILICONE: Brand: MEDLINE

## (undated) DEVICE — SUT VIC PLS CTD ANTIB BR 3/0 8/18IN 45CM

## (undated) DEVICE — C-ARM DRAPE: Brand: DEROYAL

## (undated) DEVICE — ANTIBACTERIAL UNDYED BRAIDED (POLYGLACTIN 910), SYNTHETIC ABSORBABLE SUTURE: Brand: COATED VICRYL

## (undated) DEVICE — JP PERF DRN SIL FLT 7MM FULL: Brand: CARDINAL HEALTH

## (undated) DEVICE — GLV SURG PREMIERPRO MIC LTX PF SZ7.5 BRN